# Patient Record
Sex: MALE | Race: WHITE | URBAN - METROPOLITAN AREA
[De-identification: names, ages, dates, MRNs, and addresses within clinical notes are randomized per-mention and may not be internally consistent; named-entity substitution may affect disease eponyms.]

---

## 2017-02-10 ENCOUNTER — EMERGENCY (EMERGENCY)
Facility: HOSPITAL | Age: 82
LOS: 0 days | Discharge: HOME | End: 2017-02-10
Attending: EMERGENCY MEDICINE | Admitting: EMERGENCY MEDICINE
Payer: MEDICARE

## 2017-02-10 VITALS
TEMPERATURE: 98 F | WEIGHT: 177.91 LBS | HEIGHT: 69 IN | OXYGEN SATURATION: 100 % | RESPIRATION RATE: 18 BRPM | SYSTOLIC BLOOD PRESSURE: 170 MMHG | HEART RATE: 81 BPM | DIASTOLIC BLOOD PRESSURE: 91 MMHG

## 2017-02-10 DIAGNOSIS — Z95.1 PRESENCE OF AORTOCORONARY BYPASS GRAFT: ICD-10-CM

## 2017-02-10 DIAGNOSIS — I10 ESSENTIAL (PRIMARY) HYPERTENSION: ICD-10-CM

## 2017-02-10 DIAGNOSIS — Z79.82 LONG TERM (CURRENT) USE OF ASPIRIN: ICD-10-CM

## 2017-02-10 DIAGNOSIS — Z95.1 PRESENCE OF AORTOCORONARY BYPASS GRAFT: Chronic | ICD-10-CM
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
## 2017-02-10 DIAGNOSIS — F41.9 ANXIETY DISORDER, UNSPECIFIED: ICD-10-CM

## 2017-02-10 DIAGNOSIS — S00.212A ABRASION OF LEFT EYELID AND PERIOCULAR AREA, INITIAL ENCOUNTER: ICD-10-CM
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
## 2017-02-10 DIAGNOSIS — I25.10 ATHEROSCLEROTIC HEART DISEASE OF NATIVE CORONARY ARTERY WITHOUT ANGINA PECTORIS: ICD-10-CM

## 2017-02-10 DIAGNOSIS — S09.93XA UNSPECIFIED INJURY OF FACE, INITIAL ENCOUNTER: ICD-10-CM

## 2017-02-10 PROCEDURE — 99285 EMERGENCY DEPT VISIT HI MDM: CPT

## 2017-02-10 PROCEDURE — 72125 CT NECK SPINE W/O DYE: CPT | Mod: 26

## 2017-02-10 PROCEDURE — 76377 3D RENDER W/INTRP POSTPROCES: CPT | Mod: 26

## 2017-02-10 PROCEDURE — 70450 CT HEAD/BRAIN W/O DYE: CPT | Mod: 26

## 2017-02-10 PROCEDURE — 70486 CT MAXILLOFACIAL W/O DYE: CPT | Mod: 26

## 2017-02-10 RX ORDER — TETANUS TOXOID, REDUCED DIPHTHERIA TOXOID AND ACELLULAR PERTUSSIS VACCINE, ADSORBED 5; 2.5; 8; 8; 2.5 [IU]/.5ML; [IU]/.5ML; UG/.5ML; UG/.5ML; UG/.5ML
0.5 SUSPENSION INTRAMUSCULAR ONCE
Qty: 0 | Refills: 0 | Status: DISCONTINUED | OUTPATIENT
Start: 2017-02-10 | End: 2017-02-10

## 2017-02-10 NOTE — ED PROVIDER NOTE - ENMT, MLM
Airway patent, Nasal mucosa clear. Mouth with normal mucosa. Throat has no vesicles, no oropharyngeal exudates and uvula is midline.     Mild ecchymosis inferomedially to the left eye. EOMI intact. No double vision. No evidence of orbital entrapment.

## 2017-02-10 NOTE — ED PROVIDER NOTE - DETAILS:
I, Blanca Woodall, performed the initial face to face bedside interview with this patient regarding history of present illness, review of symptoms and relevant past medical, social and family history.  I completed an independent physical examination.  I was the initial provider who evaluated this patient.  The history, relevant review of systems, past medical and surgical history, medical decision making, and physical examination was documented by the scribe in my presence and I attest to the accuracy of the documentation.    I personally saw the patient with the resident and completed the key components of the history and physical exam. I then discussed the management plan with the resident.

## 2017-02-10 NOTE — ED PROVIDER NOTE - PSH
S/P CABG x 3    S/P colonoscopy    S/P discectomy for herniated nucleus pulposus  2000, 2002 with left dropped foot

## 2017-02-10 NOTE — ED PROVIDER NOTE - NS ED MD SCRIBE ATTENDING SCRIBE SECTIONS
PROGRESS NOTE/RESULTS/PHYSICAL EXAM/REVIEW OF SYSTEMS/PAST MEDICAL/SURGICAL/SOCIAL HISTORY/HISTORY OF PRESENT ILLNESS

## 2017-02-10 NOTE — ED PROVIDER NOTE - MEDICAL DECISION MAKING DETAILS
84yoM pw fall on aspirin - no neurologic deficits/ signs of entrapment/ other injuries. Plan for CT h/ max fac/ ct neck, reassess

## 2017-02-10 NOTE — ED PROVIDER NOTE - PMH
Foot drop, left    GERD (gastroesophageal reflux disease)    H/O hyperlipidemia    HTN - Hypertension

## 2017-02-10 NOTE — ED PROVIDER NOTE - PROGRESS NOTE DETAILS
Mechanical fall with head trauma. No LOC, vomiting or neuro deficits. Plan to get CT head, CT maxillofacial, CT neck, tetanus, and d/c pending results. Pt s/o mechanical fall with head trauma. No LOC, vomiting or neuro deficits. Plan to get CT head, CT maxillofacial, CT neck, tetanus, and d/c pending results. CT imaging significant for Prachi-orbital soft tissue swelling, otherwise no acute pathology.  Patient ambulated without difficulty cleared for discharge home with return precautions and follow-up.

## 2017-02-10 NOTE — ED PROVIDER NOTE - OBJECTIVE STATEMENT
(Trauma alert called by triage) 85 y/o male with a h/o left foot drop, HTN, anxiety, CABGx3 on ASA, RBBB, BIBA s/p slip and fall from standing height in his driveway x1 hour ago, c/o abrasion above left eyebrow. +head injury, No LOC. Pt took ativan 0.5 mg a few minutes prior to fall. No numbness, weakness, blurry vision, HA, nausea, or vomiting. Pt reports chronic back pain, unchanged from baseline. NKDA. (Trauma alert called by triage) 83 y/o male with a h/o left foot drop, HTN, anxiety, CABGx3 on ASA, RBBB, BIBA s/p slip and fall from standing height on ice, in his driveway x1 hour ago, c/o abrasion above left eyebrow. +head injury, No LOC. Pt denies palpitations or dizziness prior to fall. Pt took ativan 0.5 mg a few minutes prior to fall. No numbness, weakness, blurry vision, HA, nausea, or vomiting. Pt reports chronic back pain, unchanged from baseline. NKDA.

## 2017-02-12 LAB — PSA SERPL-MCNC: 2.15

## 2017-02-14 ENCOUNTER — APPOINTMENT (OUTPATIENT)
Dept: UROLOGY | Facility: CLINIC | Age: 82
End: 2017-02-14

## 2017-02-14 VITALS
WEIGHT: 176 LBS | HEIGHT: 70 IN | SYSTOLIC BLOOD PRESSURE: 157 MMHG | HEART RATE: 92 BPM | TEMPERATURE: 97.4 F | BODY MASS INDEX: 25.2 KG/M2 | DIASTOLIC BLOOD PRESSURE: 89 MMHG

## 2017-03-10 ENCOUNTER — APPOINTMENT (OUTPATIENT)
Dept: UROLOGY | Facility: CLINIC | Age: 82
End: 2017-03-10

## 2017-03-10 VITALS — DIASTOLIC BLOOD PRESSURE: 91 MMHG | HEART RATE: 86 BPM | SYSTOLIC BLOOD PRESSURE: 185 MMHG | OXYGEN SATURATION: 99 %

## 2017-03-10 DIAGNOSIS — R35.1 NOCTURIA: ICD-10-CM

## 2017-05-17 ENCOUNTER — APPOINTMENT (OUTPATIENT)
Dept: DERMATOLOGY | Facility: CLINIC | Age: 82
End: 2017-05-17

## 2017-05-17 VITALS — BODY MASS INDEX: 25.77 KG/M2 | WEIGHT: 180 LBS | HEIGHT: 70 IN

## 2017-05-17 DIAGNOSIS — Z85.820 PERSONAL HISTORY OF MALIGNANT MELANOMA OF SKIN: ICD-10-CM

## 2017-05-17 DIAGNOSIS — Z87.19 PERSONAL HISTORY OF OTHER DISEASES OF THE DIGESTIVE SYSTEM: ICD-10-CM

## 2017-05-17 RX ORDER — OMEPRAZOLE 20 MG/1
20 CAPSULE, DELAYED RELEASE ORAL
Qty: 90 | Refills: 0 | Status: ACTIVE | COMMUNITY
Start: 2017-03-22

## 2017-05-17 RX ORDER — TAMSULOSIN HYDROCHLORIDE 0.4 MG/1
0.4 CAPSULE ORAL
Qty: 180 | Refills: 3 | Status: DISCONTINUED | COMMUNITY
Start: 2017-03-10 | End: 2017-05-17

## 2017-05-17 RX ORDER — SILODOSIN 8 MG/1
8 CAPSULE ORAL
Qty: 7 | Refills: 0 | Status: DISCONTINUED | OUTPATIENT
Start: 2017-02-14 | End: 2017-05-17

## 2017-05-17 RX ORDER — TAMSULOSIN HYDROCHLORIDE 0.4 MG/1
0.4 CAPSULE ORAL
Qty: 7 | Refills: 0 | Status: DISCONTINUED | OUTPATIENT
Start: 2017-02-14 | End: 2017-05-17

## 2017-07-05 ENCOUNTER — APPOINTMENT (OUTPATIENT)
Dept: ELECTROPHYSIOLOGY | Facility: CLINIC | Age: 82
End: 2017-07-05

## 2017-07-05 VITALS
WEIGHT: 180 LBS | BODY MASS INDEX: 25.77 KG/M2 | HEIGHT: 70 IN | DIASTOLIC BLOOD PRESSURE: 90 MMHG | SYSTOLIC BLOOD PRESSURE: 160 MMHG | HEART RATE: 66 BPM

## 2017-07-05 VITALS — HEART RATE: 60 BPM | DIASTOLIC BLOOD PRESSURE: 78 MMHG | SYSTOLIC BLOOD PRESSURE: 159 MMHG

## 2017-07-05 RX ORDER — ESOMEPRAZOLE MAGNESIUM 20 MG/1
20 CAPSULE, DELAYED RELEASE ORAL
Qty: 90 | Refills: 0 | Status: COMPLETED | COMMUNITY
Start: 2017-06-12

## 2017-08-01 ENCOUNTER — APPOINTMENT (OUTPATIENT)
Dept: DERMATOLOGY | Facility: CLINIC | Age: 82
End: 2017-08-01
Payer: MEDICARE

## 2017-08-01 DIAGNOSIS — L82.0 INFLAMED SEBORRHEIC KERATOSIS: ICD-10-CM

## 2017-08-01 PROCEDURE — 17110 DESTRUCTION B9 LES UP TO 14: CPT

## 2017-08-14 ENCOUNTER — APPOINTMENT (OUTPATIENT)
Dept: NEUROSURGERY | Facility: CLINIC | Age: 82
End: 2017-08-14
Payer: MEDICARE

## 2017-08-14 VITALS
DIASTOLIC BLOOD PRESSURE: 79 MMHG | RESPIRATION RATE: 16 BRPM | WEIGHT: 186 LBS | TEMPERATURE: 97.5 F | SYSTOLIC BLOOD PRESSURE: 146 MMHG | BODY MASS INDEX: 26.63 KG/M2 | HEIGHT: 70 IN | HEART RATE: 61 BPM

## 2017-08-14 PROCEDURE — 99203 OFFICE O/P NEW LOW 30 MIN: CPT

## 2017-08-21 ENCOUNTER — CLINICAL ADVICE (OUTPATIENT)
Age: 82
End: 2017-08-21

## 2017-11-15 ENCOUNTER — APPOINTMENT (OUTPATIENT)
Dept: DERMATOLOGY | Facility: CLINIC | Age: 82
End: 2017-11-15

## 2017-11-16 ENCOUNTER — TRANSCRIPTION ENCOUNTER (OUTPATIENT)
Age: 82
End: 2017-11-16

## 2017-12-04 ENCOUNTER — APPOINTMENT (OUTPATIENT)
Dept: NEUROSURGERY | Facility: CLINIC | Age: 82
End: 2017-12-04
Payer: MEDICARE

## 2017-12-04 VITALS
DIASTOLIC BLOOD PRESSURE: 85 MMHG | BODY MASS INDEX: 25.77 KG/M2 | WEIGHT: 180 LBS | TEMPERATURE: 97.4 F | HEIGHT: 70 IN | RESPIRATION RATE: 16 BRPM | HEART RATE: 56 BPM | SYSTOLIC BLOOD PRESSURE: 153 MMHG

## 2017-12-04 PROCEDURE — 99214 OFFICE O/P EST MOD 30 MIN: CPT

## 2018-01-02 ENCOUNTER — RX RENEWAL (OUTPATIENT)
Age: 83
End: 2018-01-02

## 2018-01-09 ENCOUNTER — OUTPATIENT (OUTPATIENT)
Dept: OUTPATIENT SERVICES | Facility: HOSPITAL | Age: 83
LOS: 1 days | End: 2018-01-09
Payer: MEDICARE

## 2018-01-09 DIAGNOSIS — Z95.1 PRESENCE OF AORTOCORONARY BYPASS GRAFT: Chronic | ICD-10-CM

## 2018-01-09 DIAGNOSIS — M54.16 RADICULOPATHY, LUMBAR REGION: ICD-10-CM

## 2018-01-09 PROCEDURE — 62323 NJX INTERLAMINAR LMBR/SAC: CPT

## 2018-01-09 PROCEDURE — 52000 CYSTOURETHROSCOPY: CPT

## 2018-01-09 PROCEDURE — 77003 FLUOROGUIDE FOR SPINE INJECT: CPT

## 2018-01-24 ENCOUNTER — APPOINTMENT (OUTPATIENT)
Dept: ELECTROPHYSIOLOGY | Facility: CLINIC | Age: 83
End: 2018-01-24

## 2018-02-05 ENCOUNTER — NON-APPOINTMENT (OUTPATIENT)
Age: 83
End: 2018-02-05

## 2018-02-05 ENCOUNTER — APPOINTMENT (OUTPATIENT)
Dept: ELECTROPHYSIOLOGY | Facility: CLINIC | Age: 83
End: 2018-02-05
Payer: MEDICARE

## 2018-02-05 VITALS
SYSTOLIC BLOOD PRESSURE: 123 MMHG | WEIGHT: 177 LBS | HEART RATE: 68 BPM | DIASTOLIC BLOOD PRESSURE: 71 MMHG | HEIGHT: 70 IN | BODY MASS INDEX: 25.34 KG/M2

## 2018-02-05 PROCEDURE — 99215 OFFICE O/P EST HI 40 MIN: CPT

## 2018-02-05 PROCEDURE — 93000 ELECTROCARDIOGRAM COMPLETE: CPT

## 2018-06-27 ENCOUNTER — EMERGENCY (EMERGENCY)
Facility: HOSPITAL | Age: 83
LOS: 0 days | Discharge: HOME | End: 2018-06-27
Attending: EMERGENCY MEDICINE | Admitting: EMERGENCY MEDICINE
Payer: MEDICARE

## 2018-06-27 VITALS
TEMPERATURE: 98 F | DIASTOLIC BLOOD PRESSURE: 99 MMHG | HEIGHT: 69 IN | OXYGEN SATURATION: 100 % | RESPIRATION RATE: 18 BRPM | WEIGHT: 175.05 LBS | HEART RATE: 94 BPM | SYSTOLIC BLOOD PRESSURE: 183 MMHG

## 2018-06-27 VITALS
OXYGEN SATURATION: 98 % | HEART RATE: 72 BPM | RESPIRATION RATE: 16 BRPM | TEMPERATURE: 98 F | SYSTOLIC BLOOD PRESSURE: 159 MMHG | DIASTOLIC BLOOD PRESSURE: 90 MMHG

## 2018-06-27 DIAGNOSIS — M54.5 LOW BACK PAIN: ICD-10-CM

## 2018-06-27 DIAGNOSIS — K21.9 GASTRO-ESOPHAGEAL REFLUX DISEASE WITHOUT ESOPHAGITIS: ICD-10-CM

## 2018-06-27 DIAGNOSIS — Z79.899 OTHER LONG TERM (CURRENT) DRUG THERAPY: ICD-10-CM

## 2018-06-27 DIAGNOSIS — Z95.1 PRESENCE OF AORTOCORONARY BYPASS GRAFT: Chronic | ICD-10-CM

## 2018-06-27 DIAGNOSIS — I10 ESSENTIAL (PRIMARY) HYPERTENSION: ICD-10-CM

## 2018-06-27 DIAGNOSIS — E78.5 HYPERLIPIDEMIA, UNSPECIFIED: ICD-10-CM

## 2018-06-27 DIAGNOSIS — Z95.1 PRESENCE OF AORTOCORONARY BYPASS GRAFT: ICD-10-CM

## 2018-06-27 PROCEDURE — 73552 X-RAY EXAM OF FEMUR 2/>: CPT | Mod: 26

## 2018-06-27 PROCEDURE — 73502 X-RAY EXAM HIP UNI 2-3 VIEWS: CPT | Mod: 26

## 2018-06-27 PROCEDURE — 99285 EMERGENCY DEPT VISIT HI MDM: CPT | Mod: 25

## 2018-06-27 PROCEDURE — 93971 EXTREMITY STUDY: CPT | Mod: 26,LT

## 2018-06-27 PROCEDURE — 72131 CT LUMBAR SPINE W/O DYE: CPT | Mod: 26

## 2018-06-27 RX ORDER — LIDOCAINE 4 G/100G
1 CREAM TOPICAL ONCE
Qty: 0 | Refills: 0 | Status: COMPLETED | OUTPATIENT
Start: 2018-06-27 | End: 2018-06-27

## 2018-06-27 RX ORDER — MORPHINE SULFATE 50 MG/1
2 CAPSULE, EXTENDED RELEASE ORAL ONCE
Qty: 0 | Refills: 0 | Status: DISCONTINUED | OUTPATIENT
Start: 2018-06-27 | End: 2018-06-27

## 2018-06-27 RX ORDER — LIDOCAINE 4 G/100G
1 CREAM TOPICAL
Qty: 14 | Refills: 0 | OUTPATIENT
Start: 2018-06-27 | End: 2018-07-03

## 2018-06-27 RX ORDER — KETOROLAC TROMETHAMINE 30 MG/ML
15 SYRINGE (ML) INJECTION ONCE
Qty: 0 | Refills: 0 | Status: DISCONTINUED | OUTPATIENT
Start: 2018-06-27 | End: 2018-06-27

## 2018-06-27 RX ORDER — ACETAMINOPHEN 500 MG
975 TABLET ORAL ONCE
Qty: 0 | Refills: 0 | Status: COMPLETED | OUTPATIENT
Start: 2018-06-27 | End: 2018-06-27

## 2018-06-27 RX ORDER — OXYCODONE AND ACETAMINOPHEN 5; 325 MG/1; MG/1
1 TABLET ORAL ONCE
Qty: 0 | Refills: 0 | Status: DISCONTINUED | OUTPATIENT
Start: 2018-06-27 | End: 2018-06-27

## 2018-06-27 RX ADMIN — Medication 15 MILLIGRAM(S): at 00:45

## 2018-06-27 RX ADMIN — OXYCODONE AND ACETAMINOPHEN 1 TABLET(S): 5; 325 TABLET ORAL at 03:00

## 2018-06-27 RX ADMIN — MORPHINE SULFATE 2 MILLIGRAM(S): 50 CAPSULE, EXTENDED RELEASE ORAL at 06:56

## 2018-06-27 RX ADMIN — MORPHINE SULFATE 2 MILLIGRAM(S): 50 CAPSULE, EXTENDED RELEASE ORAL at 06:49

## 2018-06-27 RX ADMIN — Medication 15 MILLIGRAM(S): at 03:00

## 2018-06-27 RX ADMIN — MORPHINE SULFATE 2 MILLIGRAM(S): 50 CAPSULE, EXTENDED RELEASE ORAL at 04:25

## 2018-06-27 RX ADMIN — LIDOCAINE 1 PATCH: 4 CREAM TOPICAL at 08:34

## 2018-06-27 RX ADMIN — Medication 975 MILLIGRAM(S): at 02:08

## 2018-06-27 RX ADMIN — OXYCODONE AND ACETAMINOPHEN 1 TABLET(S): 5; 325 TABLET ORAL at 04:12

## 2018-06-27 NOTE — ED ADULT NURSE NOTE - OBJECTIVE STATEMENT
Pt presents to the ED c/o L leg pain, (L thigh, glute pain) that began today. denies numbness / tingling / sob / chest pain.

## 2018-06-27 NOTE — ED PROVIDER NOTE - PROGRESS NOTE DETAILS
pt told of results.  pt states concerned that he lives alone and concerned about falling.  agree with plan for SW evaluation in the AM Pt states he does not want to wait for SW.  He would like to be discharged home.  He is having someone pick him up from the ED.  We discussed nonnarcotic medication use for pain, and follow up with spine doctor.  Kobe Perez DO.

## 2018-06-27 NOTE — ED PROVIDER NOTE - NEUROLOGICAL LEVEL OF CONSCIOUSNESS
visible left foot drop (chronic).  5/5 strength to all extremities except for left foot drop.  sensations intact

## 2018-06-27 NOTE — ED PROVIDER NOTE - OBJECTIVE STATEMENT
85 y/o male in ED c/o LLE pain x 1 day.  pt states took  motrin with no relief.   states pain severe where he couldn't ambulate.  pt denies any trauma or fall.  no sick contacts or recent travel.   pt denies any fever, HA, cp, sob, n/v/d/abd pain.   states pain starts lower thigh and radiates up through hip to lower back

## 2018-06-27 NOTE — ED ADULT NURSE NOTE - CHPI ED SYMPTOMS NEG
no weakness/no numbness/no chills/no dizziness/no decreased eating/drinking/no tingling/no vomiting/no fever/no nausea

## 2018-06-27 NOTE — ED ADULT TRIAGE NOTE - CHIEF COMPLAINT QUOTE
pt c/o left leg pain from buttocks down to foot starting 8 AM this morning worsening throughout day. denies trauma. denies numbness/tingling however normally doesn't have feeling at baseline. pt states unable to bear weight.

## 2018-06-28 ENCOUNTER — APPOINTMENT (OUTPATIENT)
Dept: NEUROSURGERY | Facility: CLINIC | Age: 83
End: 2018-06-28
Payer: MEDICARE

## 2018-06-28 VITALS
HEART RATE: 72 BPM | WEIGHT: 177 LBS | SYSTOLIC BLOOD PRESSURE: 180 MMHG | RESPIRATION RATE: 17 BRPM | DIASTOLIC BLOOD PRESSURE: 90 MMHG | HEIGHT: 70 IN | BODY MASS INDEX: 25.34 KG/M2 | TEMPERATURE: 98 F

## 2018-06-28 PROCEDURE — 99215 OFFICE O/P EST HI 40 MIN: CPT

## 2018-07-03 ENCOUNTER — APPOINTMENT (OUTPATIENT)
Dept: DERMATOLOGY | Facility: CLINIC | Age: 83
End: 2018-07-03

## 2018-07-03 ENCOUNTER — APPOINTMENT (OUTPATIENT)
Dept: NEUROSURGERY | Facility: CLINIC | Age: 83
End: 2018-07-03
Payer: MEDICARE

## 2018-07-03 VITALS
SYSTOLIC BLOOD PRESSURE: 136 MMHG | HEART RATE: 79 BPM | TEMPERATURE: 97.9 F | WEIGHT: 177 LBS | DIASTOLIC BLOOD PRESSURE: 74 MMHG | BODY MASS INDEX: 25.34 KG/M2 | HEIGHT: 70 IN | RESPIRATION RATE: 16 BRPM

## 2018-07-03 PROCEDURE — 99214 OFFICE O/P EST MOD 30 MIN: CPT

## 2018-07-10 ENCOUNTER — FORM ENCOUNTER (OUTPATIENT)
Age: 83
End: 2018-07-10

## 2018-07-11 ENCOUNTER — OUTPATIENT (OUTPATIENT)
Dept: OUTPATIENT SERVICES | Facility: HOSPITAL | Age: 83
LOS: 1 days | Discharge: HOME | End: 2018-07-11
Payer: MEDICARE

## 2018-07-11 VITALS
SYSTOLIC BLOOD PRESSURE: 156 MMHG | HEART RATE: 71 BPM | OXYGEN SATURATION: 99 % | WEIGHT: 175.05 LBS | RESPIRATION RATE: 16 BRPM | TEMPERATURE: 98 F | HEIGHT: 69.5 IN | DIASTOLIC BLOOD PRESSURE: 79 MMHG

## 2018-07-11 DIAGNOSIS — M54.16 RADICULOPATHY, LUMBAR REGION: ICD-10-CM

## 2018-07-11 DIAGNOSIS — Z41.9 ENCOUNTER FOR PROCEDURE FOR PURPOSES OTHER THAN REMEDYING HEALTH STATE, UNSPECIFIED: Chronic | ICD-10-CM

## 2018-07-11 DIAGNOSIS — M48.062 SPINAL STENOSIS, LUMBAR REGION WITH NEUROGENIC CLAUDICATION: ICD-10-CM

## 2018-07-11 DIAGNOSIS — Z90.49 ACQUIRED ABSENCE OF OTHER SPECIFIED PARTS OF DIGESTIVE TRACT: Chronic | ICD-10-CM

## 2018-07-11 DIAGNOSIS — Z95.1 PRESENCE OF AORTOCORONARY BYPASS GRAFT: Chronic | ICD-10-CM

## 2018-07-11 DIAGNOSIS — Z98.49 CATARACT EXTRACTION STATUS, UNSPECIFIED EYE: Chronic | ICD-10-CM

## 2018-07-11 LAB
ANION GAP SERPL CALC-SCNC: 5 MMOL/L — SIGNIFICANT CHANGE UP (ref 5–17)
APPEARANCE UR: CLEAR — SIGNIFICANT CHANGE UP
APTT BLD: 33.5 SEC — SIGNIFICANT CHANGE UP (ref 27.5–37.4)
BASOPHILS # BLD AUTO: 0.05 K/UL — SIGNIFICANT CHANGE UP (ref 0–0.2)
BASOPHILS NFR BLD AUTO: 0.6 % — SIGNIFICANT CHANGE UP (ref 0–2)
BILIRUB UR-MCNC: NEGATIVE — SIGNIFICANT CHANGE UP
BLD GP AB SCN SERPL QL: SIGNIFICANT CHANGE UP
BUN SERPL-MCNC: 20 MG/DL — SIGNIFICANT CHANGE UP (ref 7–23)
CALCIUM SERPL-MCNC: 9.2 MG/DL — SIGNIFICANT CHANGE UP (ref 8.5–10.1)
CHLORIDE SERPL-SCNC: 104 MMOL/L — SIGNIFICANT CHANGE UP (ref 96–108)
CO2 SERPL-SCNC: 29 MMOL/L — SIGNIFICANT CHANGE UP (ref 22–31)
COLOR SPEC: YELLOW — SIGNIFICANT CHANGE UP
CREAT SERPL-MCNC: 1.02 MG/DL — SIGNIFICANT CHANGE UP (ref 0.5–1.3)
DIFF PNL FLD: NEGATIVE — SIGNIFICANT CHANGE UP
EOSINOPHIL # BLD AUTO: 0.13 K/UL — SIGNIFICANT CHANGE UP (ref 0–0.5)
EOSINOPHIL NFR BLD AUTO: 1.6 % — SIGNIFICANT CHANGE UP (ref 0–6)
GLUCOSE SERPL-MCNC: 138 MG/DL — HIGH (ref 70–99)
GLUCOSE UR QL: NEGATIVE MG/DL — SIGNIFICANT CHANGE UP
HCT VFR BLD CALC: 41.9 % — SIGNIFICANT CHANGE UP (ref 39–50)
HGB BLD-MCNC: 14.2 G/DL — SIGNIFICANT CHANGE UP (ref 13–17)
IMM GRANULOCYTES NFR BLD AUTO: 0.2 % — SIGNIFICANT CHANGE UP (ref 0–1.5)
INR BLD: 1.07 RATIO — SIGNIFICANT CHANGE UP (ref 0.88–1.16)
KETONES UR-MCNC: ABNORMAL
LEUKOCYTE ESTERASE UR-ACNC: NEGATIVE — SIGNIFICANT CHANGE UP
LYMPHOCYTES # BLD AUTO: 1.82 K/UL — SIGNIFICANT CHANGE UP (ref 1–3.3)
LYMPHOCYTES # BLD AUTO: 22 % — SIGNIFICANT CHANGE UP (ref 13–44)
MCHC RBC-ENTMCNC: 30 PG — SIGNIFICANT CHANGE UP (ref 27–34)
MCHC RBC-ENTMCNC: 33.9 GM/DL — SIGNIFICANT CHANGE UP (ref 32–36)
MCV RBC AUTO: 88.4 FL — SIGNIFICANT CHANGE UP (ref 80–100)
MONOCYTES # BLD AUTO: 0.56 K/UL — SIGNIFICANT CHANGE UP (ref 0–0.9)
MONOCYTES NFR BLD AUTO: 6.8 % — SIGNIFICANT CHANGE UP (ref 2–14)
MRSA PCR RESULT.: SIGNIFICANT CHANGE UP
NEUTROPHILS # BLD AUTO: 5.71 K/UL — SIGNIFICANT CHANGE UP (ref 1.8–7.4)
NEUTROPHILS NFR BLD AUTO: 68.8 % — SIGNIFICANT CHANGE UP (ref 43–77)
NITRITE UR-MCNC: NEGATIVE — SIGNIFICANT CHANGE UP
NRBC # BLD: 0 /100 WBCS — SIGNIFICANT CHANGE UP (ref 0–0)
PH UR: 7 — SIGNIFICANT CHANGE UP (ref 5–8)
PLATELET # BLD AUTO: 225 K/UL — SIGNIFICANT CHANGE UP (ref 150–400)
POTASSIUM SERPL-MCNC: 4.6 MMOL/L — SIGNIFICANT CHANGE UP (ref 3.5–5.3)
POTASSIUM SERPL-SCNC: 4.6 MMOL/L — SIGNIFICANT CHANGE UP (ref 3.5–5.3)
PROT UR-MCNC: NEGATIVE MG/DL — SIGNIFICANT CHANGE UP
PROTHROM AB SERPL-ACNC: 11.6 SEC — SIGNIFICANT CHANGE UP (ref 9.8–12.7)
RBC # BLD: 4.74 M/UL — SIGNIFICANT CHANGE UP (ref 4.2–5.8)
RBC # FLD: 12.4 % — SIGNIFICANT CHANGE UP (ref 10.3–14.5)
S AUREUS DNA NOSE QL NAA+PROBE: SIGNIFICANT CHANGE UP
SODIUM SERPL-SCNC: 138 MMOL/L — SIGNIFICANT CHANGE UP (ref 135–145)
SP GR SPEC: 1.01 — SIGNIFICANT CHANGE UP (ref 1.01–1.02)
TYPE + AB SCN PNL BLD: SIGNIFICANT CHANGE UP
UROBILINOGEN FLD QL: 1 MG/DL
WBC # BLD: 8.29 K/UL — SIGNIFICANT CHANGE UP (ref 3.8–10.5)
WBC # FLD AUTO: 8.29 K/UL — SIGNIFICANT CHANGE UP (ref 3.8–10.5)

## 2018-07-11 PROCEDURE — 71046 X-RAY EXAM CHEST 2 VIEWS: CPT | Mod: 26

## 2018-07-11 PROCEDURE — 93010 ELECTROCARDIOGRAM REPORT: CPT

## 2018-07-11 NOTE — H&P PST ADULT - ASSESSMENT
86 year old male  presents to PST for to PST for L2-L3 L3-L4 bilateral decompression via unilateral laminotomy   1. PST instructions given ; NPO post midnight   2. Pt instructed to take following meds with sip of water : omeprazole avapro crestor   3. EZ wash instructions given & mupirocin instructions given  4. Medical Evaluation with Dr Vallecillo and cardio Dr Royal Carr 86 year old male  presents to PST for to PST for L2-L3 L3-L4 bilateral decompression via unilateral laminotomy   1. PST instructions given ; NPO post midnight   2. Pt instructed to take following meds with sip of water : omeprazole avapro crestor   3. EZ wash instructions given & mupirocin instructions given  4. Medical Evaluation with Dr Vallecillo and cardio Dr Marc Weinberg CAPRINI SCORE [CLOT]    AGE RELATED RISK FACTORS                                                       MOBILITY RELATED FACTORS  [ ] Age 41-60 years                                            (1 Point)                  [ ] Bed rest                                                        (1 Point)  [ ] Age: 61-74 years                                           (2 Points)                 [ ] Plaster cast                                                   (2 Points)  [x ] Age= 75 years                                              (3 Points)                 [ ] Bed bound for more than 72 hours                 (2 Points)    DISEASE RELATED RISK FACTORS                                               GENDER SPECIFIC FACTORS  [ x] Edema in the lower extremities                       (1 Point)                  [ ] Pregnancy                                                     (1 Point)  [ ] Varicose veins                                               (1 Point)                  [ ] Post-partum < 6 weeks                                   (1 Point)             [ ] BMI > 25 Kg/m2                                            (1 Point)                  [ ] Hormonal therapy  or oral contraception          (1 Point)                 [ ] Sepsis (in the previous month)                        (1 Point)                  [ ] History of pregnancy complications                 (1 point)  [ ] Pneumonia or serious lung disease                                               [ ] Unexplained or recurrent                     (1 Point)           (in the previous month)                               (1 Point)  [ ] Abnormal pulmonary function test                     (1 Point)                 SURGERY RELATED RISK FACTORS  [ ] Acute myocardial infarction                              (1 Point)                 [ ]  Section                                             (1 Point)  [ ] Congestive heart failure (in the previous month)  (1 Point)               [ ] Minor surgery                                                  (1 Point)   [ ] Inflammatory bowel disease                             (1 Point)                 [ ] Arthroscopic surgery                                        (2 Points)  [ ] Central venous access                                      (2 Points)                [x ] General surgery lasting more than 45 minutes   (2 Points)       [ ] Stroke (in the previous month)                          (5 Points)               [ ] Elective arthroplasty                                         (5 Points)                                                                                                                                               HEMATOLOGY RELATED FACTORS                                                 TRAUMA RELATED RISK FACTORS  [ ] Prior episodes of VTE                                     (3 Points)                 [ ] Fracture of the hip, pelvis, or leg                       (5 Points)  [ ] Positive family history for VTE                         (3 Points)                 [ ] Acute spinal cord injury (in the previous month)  (5 Points)  [ ] Prothrombin 61404 A                                     (3 Points)                 [ ] Paralysis  (less than 1 month)                             (5 Points)  [ ] Factor V Leiden                                             (3 Points)                  [ ] Multiple Trauma within 1 month                        (5 Points)  [ ] Lupus anticoagulants                                     (3 Points)                                                           [ ] Anticardiolipin antibodies                               (3 Points)                                                       [ ] High homocysteine in the blood                      (3 Points)                                             [ ] Other congenital or acquired thrombophilia      (3 Points)                                                [ ] Heparin induced thrombocytopenia                  (3 Points)                                          Total Score [     6     ]

## 2018-07-11 NOTE — H&P PST ADULT - PMH
BPH (benign prostatic hyperplasia)    Foot drop, left    GERD (gastroesophageal reflux disease)    H/O hyperlipidemia    HTN - Hypertension    Tremors of nervous system BPH (benign prostatic hyperplasia)    Foot drop, left    GERD (gastroesophageal reflux disease)    H/O hyperlipidemia    HTN - Hypertension    Lumbar radiculopathy    Lumbar stenosis    Tremors of nervous system Anxiety    Back pain    BPH (benign prostatic hyperplasia)    CAD (coronary artery disease) of artery bypass graft    Foot drop, left    GERD (gastroesophageal reflux disease)    H/O hyperlipidemia    HTN - Hypertension    Lumbar radiculopathy    Lumbar stenosis    Melanoma    Stented coronary artery  2004  Tremors of nervous system

## 2018-07-11 NOTE — H&P PST ADULT - NSANTHOSAYNRD_GEN_A_CORE
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 Ambulating with cane. Denies fever, chills, no acute complaints.
No. LUISANA screening performed.  STOP BANG Legend: 0-2 = LOW Risk; 3-4 = INTERMEDIATE Risk; 5-8 = HIGH Risk

## 2018-07-11 NOTE — H&P PST ADULT - HISTORY OF PRESENT ILLNESS
86 year old male 86 year old male PMH of  CAD s/p CABG x3 , HTN, HLD, tremors, GERD, BPH c/o chronic  back pain; Pt had back surgery in the past; With difficulty ambulation the past 4-5 weeks ; He has  foot drop left foot; Imaging done   Pt diagnosed with lumbar radiculopathy and lumbar stenosis ; He presents to PST for to PST for L2-L3 L3-L4 bilateral decompression via unilateral laminotomy

## 2018-07-11 NOTE — H&P PST ADULT - PSH
History of cataract surgery  IOL  History of cholecystectomy  2015  S/P CABG x 3  2013  S/P colonoscopy    S/P discectomy for herniated nucleus pulposus  2000, 2002 with left dropped foot Elective surgery  excision of melanoma  History of cataract surgery  IOL  History of cholecystectomy  2015  S/P CABG x 3  2013  S/P colonoscopy    S/P discectomy for herniated nucleus pulposus  2000, 2002 with left dropped foot

## 2018-07-16 ENCOUNTER — APPOINTMENT (OUTPATIENT)
Dept: NEUROSURGERY | Facility: HOSPITAL | Age: 83
End: 2018-07-16

## 2018-07-16 ENCOUNTER — OUTPATIENT (OUTPATIENT)
Dept: INPATIENT UNIT | Facility: HOSPITAL | Age: 83
LOS: 1 days | Discharge: HOME | End: 2018-07-16
Payer: MEDICARE

## 2018-07-16 VITALS
RESPIRATION RATE: 16 BRPM | WEIGHT: 175.05 LBS | DIASTOLIC BLOOD PRESSURE: 69 MMHG | SYSTOLIC BLOOD PRESSURE: 137 MMHG | OXYGEN SATURATION: 98 % | HEART RATE: 72 BPM | TEMPERATURE: 98 F | HEIGHT: 69 IN

## 2018-07-16 DIAGNOSIS — Z98.49 CATARACT EXTRACTION STATUS, UNSPECIFIED EYE: Chronic | ICD-10-CM

## 2018-07-16 DIAGNOSIS — Z95.1 PRESENCE OF AORTOCORONARY BYPASS GRAFT: Chronic | ICD-10-CM

## 2018-07-16 DIAGNOSIS — Z41.9 ENCOUNTER FOR PROCEDURE FOR PURPOSES OTHER THAN REMEDYING HEALTH STATE, UNSPECIFIED: Chronic | ICD-10-CM

## 2018-07-16 DIAGNOSIS — Z90.49 ACQUIRED ABSENCE OF OTHER SPECIFIED PARTS OF DIGESTIVE TRACT: Chronic | ICD-10-CM

## 2018-07-16 LAB
ANION GAP SERPL CALC-SCNC: 8 MMOL/L — SIGNIFICANT CHANGE UP (ref 5–17)
BASOPHILS # BLD AUTO: 0.03 K/UL — SIGNIFICANT CHANGE UP (ref 0–0.2)
BASOPHILS NFR BLD AUTO: 0.3 % — SIGNIFICANT CHANGE UP (ref 0–2)
BUN SERPL-MCNC: 20 MG/DL — SIGNIFICANT CHANGE UP (ref 7–23)
CALCIUM SERPL-MCNC: 9.2 MG/DL — SIGNIFICANT CHANGE UP (ref 8.5–10.1)
CHLORIDE SERPL-SCNC: 105 MMOL/L — SIGNIFICANT CHANGE UP (ref 96–108)
CO2 SERPL-SCNC: 27 MMOL/L — SIGNIFICANT CHANGE UP (ref 22–31)
CREAT SERPL-MCNC: 1.06 MG/DL — SIGNIFICANT CHANGE UP (ref 0.5–1.3)
EOSINOPHIL # BLD AUTO: 0.02 K/UL — SIGNIFICANT CHANGE UP (ref 0–0.5)
EOSINOPHIL NFR BLD AUTO: 0.2 % — SIGNIFICANT CHANGE UP (ref 0–6)
GLUCOSE SERPL-MCNC: 137 MG/DL — HIGH (ref 70–99)
HCT VFR BLD CALC: 41.8 % — SIGNIFICANT CHANGE UP (ref 39–50)
HGB BLD-MCNC: 14.5 G/DL — SIGNIFICANT CHANGE UP (ref 13–17)
IMM GRANULOCYTES NFR BLD AUTO: 0.5 % — SIGNIFICANT CHANGE UP (ref 0–1.5)
LYMPHOCYTES # BLD AUTO: 0.8 K/UL — LOW (ref 1–3.3)
LYMPHOCYTES # BLD AUTO: 8.2 % — LOW (ref 13–44)
MCHC RBC-ENTMCNC: 30.8 PG — SIGNIFICANT CHANGE UP (ref 27–34)
MCHC RBC-ENTMCNC: 34.7 GM/DL — SIGNIFICANT CHANGE UP (ref 32–36)
MCV RBC AUTO: 88.7 FL — SIGNIFICANT CHANGE UP (ref 80–100)
MONOCYTES # BLD AUTO: 0.14 K/UL — SIGNIFICANT CHANGE UP (ref 0–0.9)
MONOCYTES NFR BLD AUTO: 1.4 % — LOW (ref 2–14)
NEUTROPHILS # BLD AUTO: 8.73 K/UL — HIGH (ref 1.8–7.4)
NEUTROPHILS NFR BLD AUTO: 89.4 % — HIGH (ref 43–77)
NRBC # BLD: 0 /100 WBCS — SIGNIFICANT CHANGE UP (ref 0–0)
PLATELET # BLD AUTO: 215 K/UL — SIGNIFICANT CHANGE UP (ref 150–400)
POTASSIUM SERPL-MCNC: 4.4 MMOL/L — SIGNIFICANT CHANGE UP (ref 3.5–5.3)
POTASSIUM SERPL-SCNC: 4.4 MMOL/L — SIGNIFICANT CHANGE UP (ref 3.5–5.3)
RBC # BLD: 4.71 M/UL — SIGNIFICANT CHANGE UP (ref 4.2–5.8)
RBC # FLD: 12 % — SIGNIFICANT CHANGE UP (ref 10.3–14.5)
SODIUM SERPL-SCNC: 140 MMOL/L — SIGNIFICANT CHANGE UP (ref 135–145)
WBC # BLD: 9.77 K/UL — SIGNIFICANT CHANGE UP (ref 3.8–10.5)
WBC # FLD AUTO: 9.77 K/UL — SIGNIFICANT CHANGE UP (ref 3.8–10.5)

## 2018-07-16 PROCEDURE — 63048 LAM FACETEC &FORAMOT EA ADDL: CPT

## 2018-07-16 PROCEDURE — 99024 POSTOP FOLLOW-UP VISIT: CPT

## 2018-07-16 PROCEDURE — 63047 LAM FACETEC & FORAMOT LUMBAR: CPT

## 2018-07-16 RX ORDER — PANTOPRAZOLE SODIUM 20 MG/1
40 TABLET, DELAYED RELEASE ORAL
Qty: 0 | Refills: 0 | Status: DISCONTINUED | OUTPATIENT
Start: 2018-07-16 | End: 2018-07-17

## 2018-07-16 RX ORDER — SODIUM CHLORIDE 9 MG/ML
1000 INJECTION INTRAMUSCULAR; INTRAVENOUS; SUBCUTANEOUS
Qty: 0 | Refills: 0 | Status: DISCONTINUED | OUTPATIENT
Start: 2018-07-16 | End: 2018-07-16

## 2018-07-16 RX ORDER — ONDANSETRON 8 MG/1
4 TABLET, FILM COATED ORAL EVERY 6 HOURS
Qty: 0 | Refills: 0 | Status: DISCONTINUED | OUTPATIENT
Start: 2018-07-16 | End: 2018-07-17

## 2018-07-16 RX ORDER — FENTANYL CITRATE 50 UG/ML
50 INJECTION INTRAVENOUS
Qty: 0 | Refills: 0 | Status: DISCONTINUED | OUTPATIENT
Start: 2018-07-16 | End: 2018-07-16

## 2018-07-16 RX ORDER — METHOCARBAMOL 500 MG/1
500 TABLET, FILM COATED ORAL EVERY 6 HOURS
Qty: 0 | Refills: 0 | Status: DISCONTINUED | OUTPATIENT
Start: 2018-07-16 | End: 2018-07-17

## 2018-07-16 RX ORDER — LOSARTAN POTASSIUM 100 MG/1
100 TABLET, FILM COATED ORAL DAILY
Qty: 0 | Refills: 0 | Status: DISCONTINUED | OUTPATIENT
Start: 2018-07-16 | End: 2018-07-17

## 2018-07-16 RX ORDER — ONDANSETRON 8 MG/1
4 TABLET, FILM COATED ORAL ONCE
Qty: 0 | Refills: 0 | Status: DISCONTINUED | OUTPATIENT
Start: 2018-07-16 | End: 2018-07-16

## 2018-07-16 RX ORDER — DIPHENHYDRAMINE HCL 50 MG
12.5 CAPSULE ORAL EVERY 4 HOURS
Qty: 0 | Refills: 0 | Status: DISCONTINUED | OUTPATIENT
Start: 2018-07-16 | End: 2018-07-17

## 2018-07-16 RX ORDER — ATORVASTATIN CALCIUM 80 MG/1
20 TABLET, FILM COATED ORAL AT BEDTIME
Qty: 0 | Refills: 0 | Status: DISCONTINUED | OUTPATIENT
Start: 2018-07-16 | End: 2018-07-17

## 2018-07-16 RX ORDER — HYDROMORPHONE HYDROCHLORIDE 2 MG/ML
0.5 INJECTION INTRAMUSCULAR; INTRAVENOUS; SUBCUTANEOUS
Qty: 0 | Refills: 0 | Status: DISCONTINUED | OUTPATIENT
Start: 2018-07-16 | End: 2018-07-16

## 2018-07-16 RX ORDER — OXYCODONE HYDROCHLORIDE 5 MG/1
5 TABLET ORAL EVERY 4 HOURS
Qty: 0 | Refills: 0 | Status: DISCONTINUED | OUTPATIENT
Start: 2018-07-16 | End: 2018-07-17

## 2018-07-16 RX ORDER — SENNA PLUS 8.6 MG/1
2 TABLET ORAL AT BEDTIME
Qty: 0 | Refills: 0 | Status: DISCONTINUED | OUTPATIENT
Start: 2018-07-16 | End: 2018-07-17

## 2018-07-16 RX ORDER — HYDROMORPHONE HYDROCHLORIDE 2 MG/ML
0.5 INJECTION INTRAMUSCULAR; INTRAVENOUS; SUBCUTANEOUS
Qty: 0 | Refills: 0 | Status: DISCONTINUED | OUTPATIENT
Start: 2018-07-16 | End: 2018-07-17

## 2018-07-16 RX ORDER — HYDROMORPHONE HYDROCHLORIDE 2 MG/ML
1 INJECTION INTRAMUSCULAR; INTRAVENOUS; SUBCUTANEOUS EVERY 4 HOURS
Qty: 0 | Refills: 0 | Status: DISCONTINUED | OUTPATIENT
Start: 2018-07-16 | End: 2018-07-17

## 2018-07-16 RX ORDER — ACETAMINOPHEN 500 MG
1000 TABLET ORAL ONCE
Qty: 0 | Refills: 0 | Status: COMPLETED | OUTPATIENT
Start: 2018-07-16 | End: 2018-07-16

## 2018-07-16 RX ORDER — CEFAZOLIN SODIUM 1 G
2000 VIAL (EA) INJECTION EVERY 8 HOURS
Qty: 0 | Refills: 0 | Status: COMPLETED | OUTPATIENT
Start: 2018-07-16 | End: 2018-07-17

## 2018-07-16 RX ORDER — METOCLOPRAMIDE HCL 10 MG
10 TABLET ORAL ONCE
Qty: 0 | Refills: 0 | Status: DISCONTINUED | OUTPATIENT
Start: 2018-07-16 | End: 2018-07-17

## 2018-07-16 RX ORDER — GABAPENTIN 400 MG/1
300 CAPSULE ORAL AT BEDTIME
Qty: 0 | Refills: 0 | Status: DISCONTINUED | OUTPATIENT
Start: 2018-07-16 | End: 2018-07-17

## 2018-07-16 RX ORDER — OXYCODONE HYDROCHLORIDE 5 MG/1
10 TABLET ORAL EVERY 4 HOURS
Qty: 0 | Refills: 0 | Status: DISCONTINUED | OUTPATIENT
Start: 2018-07-16 | End: 2018-07-17

## 2018-07-16 RX ORDER — DOCUSATE SODIUM 100 MG
100 CAPSULE ORAL THREE TIMES A DAY
Qty: 0 | Refills: 0 | Status: DISCONTINUED | OUTPATIENT
Start: 2018-07-16 | End: 2018-07-17

## 2018-07-16 RX ORDER — ACETAMINOPHEN 500 MG
1000 TABLET ORAL ONCE
Qty: 0 | Refills: 0 | Status: DISCONTINUED | OUTPATIENT
Start: 2018-07-16 | End: 2018-07-17

## 2018-07-16 RX ADMIN — GABAPENTIN 300 MILLIGRAM(S): 400 CAPSULE ORAL at 21:05

## 2018-07-16 RX ADMIN — Medication 100 MILLIGRAM(S): at 21:05

## 2018-07-16 RX ADMIN — HYDROMORPHONE HYDROCHLORIDE 0.5 MILLIGRAM(S): 2 INJECTION INTRAMUSCULAR; INTRAVENOUS; SUBCUTANEOUS at 17:24

## 2018-07-16 RX ADMIN — Medication 400 MILLIGRAM(S): at 17:07

## 2018-07-16 RX ADMIN — FENTANYL CITRATE 50 MICROGRAM(S): 50 INJECTION INTRAVENOUS at 17:24

## 2018-07-16 RX ADMIN — Medication 100 MILLIGRAM(S): at 20:18

## 2018-07-16 RX ADMIN — OXYCODONE HYDROCHLORIDE 5 MILLIGRAM(S): 5 TABLET ORAL at 23:29

## 2018-07-16 RX ADMIN — FENTANYL CITRATE 50 MICROGRAM(S): 50 INJECTION INTRAVENOUS at 17:23

## 2018-07-16 RX ADMIN — SENNA PLUS 2 TABLET(S): 8.6 TABLET ORAL at 21:05

## 2018-07-16 RX ADMIN — HYDROMORPHONE HYDROCHLORIDE 0.5 MILLIGRAM(S): 2 INJECTION INTRAMUSCULAR; INTRAVENOUS; SUBCUTANEOUS at 17:23

## 2018-07-16 RX ADMIN — HYDROMORPHONE HYDROCHLORIDE 0.5 MILLIGRAM(S): 2 INJECTION INTRAMUSCULAR; INTRAVENOUS; SUBCUTANEOUS at 17:02

## 2018-07-16 RX ADMIN — FENTANYL CITRATE 50 MICROGRAM(S): 50 INJECTION INTRAVENOUS at 16:51

## 2018-07-16 RX ADMIN — ATORVASTATIN CALCIUM 20 MILLIGRAM(S): 80 TABLET, FILM COATED ORAL at 21:05

## 2018-07-16 RX ADMIN — FENTANYL CITRATE 50 MICROGRAM(S): 50 INJECTION INTRAVENOUS at 16:44

## 2018-07-16 NOTE — PROGRESS NOTE ADULT - ASSESSMENT
85 yo male Left L2/3 and L3/4 laminotomy with bilateral decompression.      - advance diet as tolerated   - oob w/ pT   - awaiting void trial/ bladder scan   - cont current pain regimen   - encourage incentive shaina   - seq teds on for dvt pph   - cont bowel regimen   - all above d/w Dr Rowell

## 2018-07-16 NOTE — PROGRESS NOTE ADULT - SUBJECTIVE AND OBJECTIVE BOX
Patient is a 86y old  Male who presents with a chief complaint of L 2/3 L3/4 Bilateral decompression via unilateral laminotomy (16 Jul 2018 10:12)      HPI:    Pt seen and examined post-operatively, pt doing well. Pt is s/p Left L2/3 and L3/4 laminotomy with bilateral decompression.  Pt states he has some back pain, denies any radicular pain. Pt has not voided yet. Pt denies any numbness, tingling, or weakness. Pt states pre-op leg pain is improved.         PAST MEDICAL & SURGICAL HISTORY:  Back pain  CAD (coronary artery disease) of artery bypass graft  Anxiety  Melanoma  Stented coronary artery: 2004  Lumbar stenosis  Lumbar radiculopathy  BPH (benign prostatic hyperplasia)  Tremors of nervous system  Foot drop, left  GERD (gastroesophageal reflux disease)  HTN - Hypertension  H/O hyperlipidemia  Elective surgery: excision of melanoma  History of cataract surgery: IOL  History of cholecystectomy: 2015  S/P CABG x 3: 2013  S/P colonoscopy  S/P discectomy for herniated nucleus pulposus: 2000, 2002 with left dropped foot      FAMILY HISTORY:  No pertinent family history in first degree relatives      Social Hx:  Nonsmoker, no drug or alcohol use    MEDICATIONS  (STANDING):  acetaminophen  IVPB. 1000 milliGRAM(s) IV Intermittent once  atorvastatin 20 milliGRAM(s) Oral at bedtime  ceFAZolin   IVPB 2000 milliGRAM(s) IV Intermittent every 8 hours  docusate sodium 100 milliGRAM(s) Oral three times a day  gabapentin 300 milliGRAM(s) Oral at bedtime  losartan 100 milliGRAM(s) Oral daily  multivitamin 1 Tablet(s) Oral daily  pantoprazole    Tablet 40 milliGRAM(s) Oral before breakfast  senna 2 Tablet(s) Oral at bedtime       Allergies    No Known Allergies    Intolerances        ROS: Pertinent positives in HPI, all other ROS were reviewed and are negative.      Vital Signs Last 24 Hrs  T(C): 36.3 (16 Jul 2018 18:54), Max: 36.6 (16 Jul 2018 10:07)  T(F): 97.4 (16 Jul 2018 18:54), Max: 97.8 (16 Jul 2018 10:07)  HR: 87 (16 Jul 2018 18:54) (72 - 90)  BP: 157/75 (16 Jul 2018 18:54) (137/69 - 170/78)  BP(mean): --  RR: 18 (16 Jul 2018 18:54) (13 - 18)  SpO2: 100% (16 Jul 2018 18:54) (98% - 100%)    Constitutional: awake and alert.  HEENT: PERRLA, EOMI,   Neck: Supple.  Respiratory: Breath sounds are clear bilaterally  Cardiovascular: S1 and S2, regular rhythm  Gastrointestinal: soft, nontender  Extremities:  no edema  Vascular: Caritid Bruit - no  Musculoskeletal: no joint swelling/tenderness, no abnormal movements  Skin: No rashes    Neurological exam:  HF: A x O x 3. Appropriately interactive, normal affect. Speech fluent, No Aphasia or paraphasic errors. Naming /repetition intact   CN: JONATHAN, EOMI, VFF,   Motor: No pronator drift, Strength 5/5 in all 4 ext, except left old foot drop DF 0/5  Sens: Intact to light touch  Gait/Balance: /Cannot test    Labs:   07-16    140  |  105  |  20  ----------------------------<  137<H>  4.4   |  27  |  1.06    Ca    9.2      16 Jul 2018 17:09                       14.5   9.77  )-----------( 215      ( 16 Jul 2018 17:09 )             41.8

## 2018-07-16 NOTE — BRIEF OPERATIVE NOTE - PROCEDURE
<<-----Click on this checkbox to enter Procedure Laminectomy for decompression of spine  07/16/2018  Left L2/3 and L3/4 laminotomy with bilateral decompression.  Active  [id number]

## 2018-07-16 NOTE — PATIENT PROFILE ADULT. - PMH
Anxiety    Back pain    BPH (benign prostatic hyperplasia)    CAD (coronary artery disease) of artery bypass graft    Foot drop, left    GERD (gastroesophageal reflux disease)    H/O hyperlipidemia    HTN - Hypertension    Lumbar radiculopathy    Lumbar stenosis    Melanoma    Stented coronary artery  2004  Tremors of nervous system

## 2018-07-16 NOTE — BRIEF OPERATIVE NOTE - POST-OP DX
Lumbar radiculopathy  07/16/2018    Active  Carter Rowell  Lumbar stenosis with neurogenic claudication  07/16/2018    Active  Carter Rowell

## 2018-07-16 NOTE — PATIENT PROFILE ADULT. - PSH
Elective surgery  excision of melanoma  History of cataract surgery  IOL  History of cholecystectomy  2015  S/P CABG x 3  2013  S/P colonoscopy    S/P discectomy for herniated nucleus pulposus  2000, 2002 with left dropped foot

## 2018-07-17 ENCOUNTER — TRANSCRIPTION ENCOUNTER (OUTPATIENT)
Age: 83
End: 2018-07-17

## 2018-07-17 VITALS
RESPIRATION RATE: 16 BRPM | OXYGEN SATURATION: 99 % | DIASTOLIC BLOOD PRESSURE: 66 MMHG | SYSTOLIC BLOOD PRESSURE: 107 MMHG | TEMPERATURE: 98 F | HEART RATE: 81 BPM

## 2018-07-17 LAB
ANION GAP SERPL CALC-SCNC: 9 MMOL/L — SIGNIFICANT CHANGE UP (ref 5–17)
BASOPHILS # BLD AUTO: 0.01 K/UL — SIGNIFICANT CHANGE UP (ref 0–0.2)
BASOPHILS NFR BLD AUTO: 0.1 % — SIGNIFICANT CHANGE UP (ref 0–2)
BUN SERPL-MCNC: 19 MG/DL — SIGNIFICANT CHANGE UP (ref 7–23)
CALCIUM SERPL-MCNC: 9.3 MG/DL — SIGNIFICANT CHANGE UP (ref 8.5–10.1)
CHLORIDE SERPL-SCNC: 102 MMOL/L — SIGNIFICANT CHANGE UP (ref 96–108)
CO2 SERPL-SCNC: 27 MMOL/L — SIGNIFICANT CHANGE UP (ref 22–31)
CREAT SERPL-MCNC: 1.24 MG/DL — SIGNIFICANT CHANGE UP (ref 0.5–1.3)
EOSINOPHIL # BLD AUTO: 0 K/UL — SIGNIFICANT CHANGE UP (ref 0–0.5)
EOSINOPHIL NFR BLD AUTO: 0 % — SIGNIFICANT CHANGE UP (ref 0–6)
GLUCOSE SERPL-MCNC: 132 MG/DL — HIGH (ref 70–99)
HCT VFR BLD CALC: 43.1 % — SIGNIFICANT CHANGE UP (ref 39–50)
HGB BLD-MCNC: 14.7 G/DL — SIGNIFICANT CHANGE UP (ref 13–17)
IMM GRANULOCYTES NFR BLD AUTO: 0.5 % — SIGNIFICANT CHANGE UP (ref 0–1.5)
LYMPHOCYTES # BLD AUTO: 0.91 K/UL — LOW (ref 1–3.3)
LYMPHOCYTES # BLD AUTO: 7.1 % — LOW (ref 13–44)
MCHC RBC-ENTMCNC: 29.8 PG — SIGNIFICANT CHANGE UP (ref 27–34)
MCHC RBC-ENTMCNC: 34.1 GM/DL — SIGNIFICANT CHANGE UP (ref 32–36)
MCV RBC AUTO: 87.2 FL — SIGNIFICANT CHANGE UP (ref 80–100)
MONOCYTES # BLD AUTO: 0.47 K/UL — SIGNIFICANT CHANGE UP (ref 0–0.9)
MONOCYTES NFR BLD AUTO: 3.6 % — SIGNIFICANT CHANGE UP (ref 2–14)
NEUTROPHILS # BLD AUTO: 11.45 K/UL — HIGH (ref 1.8–7.4)
NEUTROPHILS NFR BLD AUTO: 88.7 % — HIGH (ref 43–77)
NRBC # BLD: 0 /100 WBCS — SIGNIFICANT CHANGE UP (ref 0–0)
PLATELET # BLD AUTO: 255 K/UL — SIGNIFICANT CHANGE UP (ref 150–400)
POTASSIUM SERPL-MCNC: 4 MMOL/L — SIGNIFICANT CHANGE UP (ref 3.5–5.3)
POTASSIUM SERPL-SCNC: 4 MMOL/L — SIGNIFICANT CHANGE UP (ref 3.5–5.3)
RBC # BLD: 4.94 M/UL — SIGNIFICANT CHANGE UP (ref 4.2–5.8)
RBC # FLD: 12.3 % — SIGNIFICANT CHANGE UP (ref 10.3–14.5)
SODIUM SERPL-SCNC: 138 MMOL/L — SIGNIFICANT CHANGE UP (ref 135–145)
WBC # BLD: 12.9 K/UL — HIGH (ref 3.8–10.5)
WBC # FLD AUTO: 12.9 K/UL — HIGH (ref 3.8–10.5)

## 2018-07-17 PROCEDURE — 99024 POSTOP FOLLOW-UP VISIT: CPT

## 2018-07-17 PROCEDURE — 99238 HOSP IP/OBS DSCHRG MGMT 30/<: CPT | Mod: 24

## 2018-07-17 RX ORDER — TAMSULOSIN HYDROCHLORIDE 0.4 MG/1
0.4 CAPSULE ORAL AT BEDTIME
Qty: 0 | Refills: 0 | Status: DISCONTINUED | OUTPATIENT
Start: 2018-07-17 | End: 2018-07-17

## 2018-07-17 RX ORDER — OXYCODONE HYDROCHLORIDE 5 MG/1
1 TABLET ORAL
Qty: 30 | Refills: 0
Start: 2018-07-17

## 2018-07-17 RX ORDER — ASPIRIN/CALCIUM CARB/MAGNESIUM 324 MG
1 TABLET ORAL
Qty: 0 | Refills: 0 | COMMUNITY

## 2018-07-17 RX ORDER — METHOCARBAMOL 500 MG/1
1 TABLET, FILM COATED ORAL
Qty: 30 | Refills: 0
Start: 2018-07-17

## 2018-07-17 RX ADMIN — Medication 100 MILLIGRAM(S): at 03:40

## 2018-07-17 RX ADMIN — Medication 100 MILLIGRAM(S): at 14:32

## 2018-07-17 RX ADMIN — PANTOPRAZOLE SODIUM 40 MILLIGRAM(S): 20 TABLET, DELAYED RELEASE ORAL at 05:58

## 2018-07-17 RX ADMIN — Medication 1 TABLET(S): at 11:39

## 2018-07-17 RX ADMIN — Medication 100 MILLIGRAM(S): at 05:58

## 2018-07-17 RX ADMIN — LOSARTAN POTASSIUM 100 MILLIGRAM(S): 100 TABLET, FILM COATED ORAL at 05:58

## 2018-07-17 RX ADMIN — OXYCODONE HYDROCHLORIDE 5 MILLIGRAM(S): 5 TABLET ORAL at 00:10

## 2018-07-17 NOTE — DISCHARGE NOTE ADULT - CARE PROVIDER_API CALL
Carter Rowell; PhD), Neurosurgery  [address]  [address] [place] [address]  Phone: [phone]  Fax: [phone]

## 2018-07-17 NOTE — DISCHARGE NOTE ADULT - PLAN OF CARE
return to normal function Monitor incision for signs of infection including redness, swelling, and discharge. You may shower but do not let water 'beat' directly on incision. Gradually increase activities / walking. Continue to use your incentive spirometer. Any new numbness / tingling / weakness, any change in character or intensity of pain, or any sign of infection - call Dr Rowell's office or visit the closet emergency room

## 2018-07-17 NOTE — DISCHARGE NOTE ADULT - HOSPITAL COURSE
85 yo male status post L2/3, L3/4 unilateral laminotomy / foraminotomy. Postoperative course complicated by episode of urinary retention requiring placement of Ramirez catheter. Ramirez now removed and patient voiding freely. Now stable for discharge home with close outpatient followup

## 2018-07-17 NOTE — DISCHARGE NOTE ADULT - PATIENT PORTAL LINK FT
You can access the [facility] Patient Portal, offered by [facility], by registering with the following website: [email or web address]

## 2018-07-17 NOTE — DISCHARGE NOTE ADULT - MEDICATION SUMMARY - MEDICATIONS TO TAKE
I will START or STAY ON the medications listed below when I get home from the hospital:    Aleve 220 mg oral tablet  -- 1 tab(s) by mouth every 8 hours, As Needed-stop 7 days pre-op  -- Indication: For As needed for mild pain    oxyCODONE 5 mg oral tablet  -- 1 tab(s) by mouth every 6 hours, As Needed- for moderate pain MDD:4  -- Indication: For As needed for moderate pain    Avapro 300 mg oral tablet  -- 1 tab(s) by mouth once a day  -- Indication: For Home medication -  hypertension    Crestor 5 mg oral tablet  -- 1 tab(s) by mouth once a day AM  -- Indication: For Home medication - dyslipidemia    methocarbamol 500 mg oral tablet  -- 1 tab(s) by mouth every 6 hours, As needed, Back Spasms  -- Indication: For As needed for muscle spasms    CoQ10 300 mg oral capsule  -- 1 cap(s) by mouth once a day  -- Indication: For Home medication - supplement    omeprazole 40 mg oral delayed release capsule  -- 1 cap(s) by mouth once a day  -- Indication: For Home medication - GERD

## 2018-07-17 NOTE — DISCHARGE NOTE ADULT - CARE PLAN
Principal Discharge DX:	Lumbar radiculopathy  Goal:	return to normal function  Assessment and plan of treatment:	Monitor incision for signs of infection including redness, swelling, and discharge. You may shower but do not let water 'beat' directly on incision. Gradually increase activities / walking. Continue to use your incentive spirometer. Any new numbness / tingling / weakness, any change in character or intensity of pain, or any sign of infection - call Dr Rowell's office or visit the closet emergency room

## 2018-07-19 ENCOUNTER — APPOINTMENT (OUTPATIENT)
Dept: NEUROLOGY | Facility: CLINIC | Age: 83
End: 2018-07-19

## 2018-07-25 PROBLEM — M54.16 RADICULOPATHY, LUMBAR REGION: Chronic | Status: ACTIVE | Noted: 2018-07-11

## 2018-07-25 PROBLEM — C43.9 MALIGNANT MELANOMA OF SKIN, UNSPECIFIED: Chronic | Status: ACTIVE | Noted: 2018-07-11

## 2018-07-25 PROBLEM — N40.0 BENIGN PROSTATIC HYPERPLASIA WITHOUT LOWER URINARY TRACT SYMPTOMS: Chronic | Status: ACTIVE | Noted: 2018-07-11

## 2018-07-25 PROBLEM — F41.9 ANXIETY DISORDER, UNSPECIFIED: Chronic | Status: ACTIVE | Noted: 2018-07-11

## 2018-07-25 PROBLEM — M48.061 SPINAL STENOSIS, LUMBAR REGION WITHOUT NEUROGENIC CLAUDICATION: Chronic | Status: ACTIVE | Noted: 2018-07-11

## 2018-07-25 PROBLEM — M21.372 FOOT DROP, LEFT FOOT: Chronic | Status: ACTIVE | Noted: 2017-02-10

## 2018-07-25 PROBLEM — R25.1 TREMOR, UNSPECIFIED: Chronic | Status: ACTIVE | Noted: 2018-07-11

## 2018-07-25 PROBLEM — I25.810 ATHEROSCLEROSIS OF CORONARY ARTERY BYPASS GRAFT(S) WITHOUT ANGINA PECTORIS: Chronic | Status: ACTIVE | Noted: 2018-07-11

## 2018-07-25 PROBLEM — Z95.5 PRESENCE OF CORONARY ANGIOPLASTY IMPLANT AND GRAFT: Chronic | Status: ACTIVE | Noted: 2018-07-11

## 2018-07-25 PROBLEM — M54.9 DORSALGIA, UNSPECIFIED: Chronic | Status: ACTIVE | Noted: 2018-07-11

## 2018-07-26 DIAGNOSIS — K21.9 GASTRO-ESOPHAGEAL REFLUX DISEASE WITHOUT ESOPHAGITIS: ICD-10-CM

## 2018-07-26 DIAGNOSIS — R35.0 FREQUENCY OF MICTURITION: ICD-10-CM

## 2018-07-26 DIAGNOSIS — Z79.82 LONG TERM (CURRENT) USE OF ASPIRIN: ICD-10-CM

## 2018-07-26 DIAGNOSIS — L57.0 ACTINIC KERATOSIS: ICD-10-CM

## 2018-07-26 DIAGNOSIS — N40.1 BENIGN PROSTATIC HYPERPLASIA WITH LOWER URINARY TRACT SYMPTOMS: ICD-10-CM

## 2018-07-26 DIAGNOSIS — M48.062 SPINAL STENOSIS, LUMBAR REGION WITH NEUROGENIC CLAUDICATION: ICD-10-CM

## 2018-07-26 DIAGNOSIS — Z95.1 PRESENCE OF AORTOCORONARY BYPASS GRAFT: ICD-10-CM

## 2018-07-26 DIAGNOSIS — Z96.1 PRESENCE OF INTRAOCULAR LENS: ICD-10-CM

## 2018-07-26 DIAGNOSIS — F41.9 ANXIETY DISORDER, UNSPECIFIED: ICD-10-CM

## 2018-07-26 DIAGNOSIS — M54.16 RADICULOPATHY, LUMBAR REGION: ICD-10-CM

## 2018-07-26 DIAGNOSIS — Z98.49 CATARACT EXTRACTION STATUS, UNSPECIFIED EYE: ICD-10-CM

## 2018-07-26 DIAGNOSIS — I10 ESSENTIAL (PRIMARY) HYPERTENSION: ICD-10-CM

## 2018-07-26 DIAGNOSIS — I45.10 UNSPECIFIED RIGHT BUNDLE-BRANCH BLOCK: ICD-10-CM

## 2018-07-26 DIAGNOSIS — N39.43 POST-VOID DRIBBLING: ICD-10-CM

## 2018-07-26 DIAGNOSIS — R25.1 TREMOR, UNSPECIFIED: ICD-10-CM

## 2018-07-26 DIAGNOSIS — E78.5 HYPERLIPIDEMIA, UNSPECIFIED: ICD-10-CM

## 2018-07-26 DIAGNOSIS — Z87.891 PERSONAL HISTORY OF NICOTINE DEPENDENCE: ICD-10-CM

## 2018-07-26 DIAGNOSIS — Z85.820 PERSONAL HISTORY OF MALIGNANT MELANOMA OF SKIN: ICD-10-CM

## 2018-07-26 DIAGNOSIS — I25.810 ATHEROSCLEROSIS OF CORONARY ARTERY BYPASS GRAFT(S) WITHOUT ANGINA PECTORIS: ICD-10-CM

## 2018-07-26 DIAGNOSIS — Z95.5 PRESENCE OF CORONARY ANGIOPLASTY IMPLANT AND GRAFT: ICD-10-CM

## 2018-07-26 DIAGNOSIS — R35.1 NOCTURIA: ICD-10-CM

## 2018-07-31 ENCOUNTER — APPOINTMENT (OUTPATIENT)
Dept: NEUROSURGERY | Facility: CLINIC | Age: 83
End: 2018-07-31
Payer: MEDICARE

## 2018-07-31 VITALS
SYSTOLIC BLOOD PRESSURE: 127 MMHG | WEIGHT: 175 LBS | BODY MASS INDEX: 25.05 KG/M2 | HEIGHT: 70 IN | TEMPERATURE: 97.8 F | RESPIRATION RATE: 16 BRPM | HEART RATE: 79 BPM | DIASTOLIC BLOOD PRESSURE: 74 MMHG

## 2018-07-31 DIAGNOSIS — M48.062 SPINAL STENOSIS, LUMBAR REGION WITH NEUROGENIC CLAUDICATION: ICD-10-CM

## 2018-07-31 PROCEDURE — 99024 POSTOP FOLLOW-UP VISIT: CPT

## 2018-07-31 RX ORDER — METHYLPREDNISOLONE 4 MG/1
4 TABLET ORAL
Qty: 2 | Refills: 0 | Status: DISCONTINUED | COMMUNITY
Start: 2018-06-28 | End: 2018-07-31

## 2018-07-31 RX ORDER — BUSPIRONE HYDROCHLORIDE 15 MG/1
15 TABLET ORAL
Qty: 180 | Refills: 0 | Status: DISCONTINUED | COMMUNITY
Start: 2017-06-10 | End: 2018-07-31

## 2018-07-31 RX ORDER — GABAPENTIN 100 MG/1
100 CAPSULE ORAL 3 TIMES DAILY
Qty: 30 | Refills: 0 | Status: DISCONTINUED | COMMUNITY
Start: 2018-06-28 | End: 2018-07-31

## 2018-08-03 ENCOUNTER — EMERGENCY (EMERGENCY)
Facility: HOSPITAL | Age: 83
LOS: 0 days | Discharge: HOME | End: 2018-08-03
Attending: EMERGENCY MEDICINE | Admitting: EMERGENCY MEDICINE
Payer: MEDICARE

## 2018-08-03 VITALS
SYSTOLIC BLOOD PRESSURE: 146 MMHG | DIASTOLIC BLOOD PRESSURE: 80 MMHG | RESPIRATION RATE: 18 BRPM | WEIGHT: 195.11 LBS | OXYGEN SATURATION: 100 % | HEART RATE: 69 BPM | TEMPERATURE: 98 F

## 2018-08-03 DIAGNOSIS — S09.90XA UNSPECIFIED INJURY OF HEAD, INITIAL ENCOUNTER: ICD-10-CM

## 2018-08-03 DIAGNOSIS — Z95.1 PRESENCE OF AORTOCORONARY BYPASS GRAFT: Chronic | ICD-10-CM

## 2018-08-03 DIAGNOSIS — Z79.899 OTHER LONG TERM (CURRENT) DRUG THERAPY: ICD-10-CM

## 2018-08-03 DIAGNOSIS — Z90.49 ACQUIRED ABSENCE OF OTHER SPECIFIED PARTS OF DIGESTIVE TRACT: Chronic | ICD-10-CM

## 2018-08-03 DIAGNOSIS — Z79.82 LONG TERM (CURRENT) USE OF ASPIRIN: ICD-10-CM

## 2018-08-03 DIAGNOSIS — M21.372 FOOT DROP, LEFT FOOT: ICD-10-CM

## 2018-08-03 DIAGNOSIS — Z98.49 CATARACT EXTRACTION STATUS, UNSPECIFIED EYE: Chronic | ICD-10-CM

## 2018-08-03 DIAGNOSIS — I25.10 ATHEROSCLEROTIC HEART DISEASE OF NATIVE CORONARY ARTERY WITHOUT ANGINA PECTORIS: ICD-10-CM

## 2018-08-03 DIAGNOSIS — K21.9 GASTRO-ESOPHAGEAL REFLUX DISEASE WITHOUT ESOPHAGITIS: ICD-10-CM
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
## 2018-08-03 DIAGNOSIS — Z41.9 ENCOUNTER FOR PROCEDURE FOR PURPOSES OTHER THAN REMEDYING HEALTH STATE, UNSPECIFIED: Chronic | ICD-10-CM

## 2018-08-03 DIAGNOSIS — E78.4 OTHER HYPERLIPIDEMIA: ICD-10-CM

## 2018-08-03 DIAGNOSIS — Z09 ENCOUNTER FOR FOLLOW-UP EXAMINATION AFTER COMPLETED TREATMENT FOR CONDITIONS OTHER THAN MALIGNANT NEOPLASM: ICD-10-CM

## 2018-08-03 PROCEDURE — 72125 CT NECK SPINE W/O DYE: CPT | Mod: 26

## 2018-08-03 PROCEDURE — 70450 CT HEAD/BRAIN W/O DYE: CPT | Mod: 26

## 2018-08-03 PROCEDURE — 99284 EMERGENCY DEPT VISIT MOD MDM: CPT | Mod: 25

## 2018-08-03 RX ORDER — ACETAMINOPHEN 500 MG
1000 TABLET ORAL ONCE
Qty: 0 | Refills: 0 | Status: COMPLETED | OUTPATIENT
Start: 2018-08-03 | End: 2018-08-03

## 2018-08-03 RX ADMIN — Medication 1000 MILLIGRAM(S): at 03:27

## 2018-08-03 NOTE — ED PROVIDER NOTE - CONSTITUTIONAL, MLM
normal... Well appearing, well nourished, awake, alert, oriented to person, place, time/situation and in no apparent distress. HEAD:  Small hematoma on left frontal scalp and forehead

## 2018-08-03 NOTE — ED PROVIDER NOTE - OBJECTIVE STATEMENT
Pt. is an 85 yo M with a hx of CAD on aspirin daily, BPH, GERD, hyperlipidemia, left foot weakness and foot drop requiring lumbar spinal decompression surgery last month BIB ambulance after stumbling in the dark on his way to the bathroom.  Pt. states due to his left foot brace and weakness he lost balance and stumbled, falling from standing position and hitting head on the hard floor.  +bump on left forehead and dull headache.  Pt. woke up his 24/7 nursing aide to let her know he fell and hit his head and has a headache.  Pt. denies neck pain, back pain, pain in extremities. Pt. ambulated with assistance after the fall. No LOC, vomiting, dizziness, chest pain, or trouble breathing.

## 2018-08-03 NOTE — ED ADULT NURSE NOTE - OBJECTIVE STATEMENT
Pt presents to ED s/p fall at home on asa onto wood floor, denies LOC, chest pain, SOB.  No evidence of lacerations or trauma to area.

## 2018-08-03 NOTE — ED PROVIDER NOTE - MEDICAL DECISION MAKING DETAILS
Mechanical fall: CTs of head and C spine ordered and if negative patient will return home with 24/7 aide.

## 2018-08-10 ENCOUNTER — NON-APPOINTMENT (OUTPATIENT)
Age: 83
End: 2018-08-10

## 2018-08-10 ENCOUNTER — APPOINTMENT (OUTPATIENT)
Dept: ELECTROPHYSIOLOGY | Facility: CLINIC | Age: 83
End: 2018-08-10
Payer: MEDICARE

## 2018-08-10 VITALS
HEIGHT: 70 IN | DIASTOLIC BLOOD PRESSURE: 85 MMHG | SYSTOLIC BLOOD PRESSURE: 145 MMHG | BODY MASS INDEX: 25.05 KG/M2 | WEIGHT: 175 LBS | HEART RATE: 64 BPM

## 2018-08-10 DIAGNOSIS — I10 ESSENTIAL (PRIMARY) HYPERTENSION: ICD-10-CM

## 2018-08-10 PROCEDURE — 93000 ELECTROCARDIOGRAM COMPLETE: CPT

## 2018-08-10 PROCEDURE — 99214 OFFICE O/P EST MOD 30 MIN: CPT

## 2018-08-13 ENCOUNTER — RX RENEWAL (OUTPATIENT)
Age: 83
End: 2018-08-13

## 2018-08-28 ENCOUNTER — APPOINTMENT (OUTPATIENT)
Dept: NEUROSURGERY | Facility: CLINIC | Age: 83
End: 2018-08-28
Payer: MEDICARE

## 2018-08-28 VITALS
BODY MASS INDEX: 27.75 KG/M2 | SYSTOLIC BLOOD PRESSURE: 148 MMHG | DIASTOLIC BLOOD PRESSURE: 75 MMHG | HEART RATE: 88 BPM | HEIGHT: 67.72 IN | RESPIRATION RATE: 17 BRPM | WEIGHT: 181 LBS

## 2018-08-28 DIAGNOSIS — Z87.39 PERSONAL HISTORY OF OTHER DISEASES OF THE MUSCULOSKELETAL SYSTEM AND CONNECTIVE TISSUE: ICD-10-CM

## 2018-08-28 PROCEDURE — 99024 POSTOP FOLLOW-UP VISIT: CPT

## 2018-09-25 ENCOUNTER — APPOINTMENT (OUTPATIENT)
Dept: DERMATOLOGY | Facility: CLINIC | Age: 83
End: 2018-09-25
Payer: MEDICARE

## 2018-09-25 VITALS — BODY MASS INDEX: 28.41 KG/M2 | HEIGHT: 67 IN | WEIGHT: 181 LBS

## 2018-09-25 PROCEDURE — 17000 DESTRUCT PREMALG LESION: CPT

## 2018-09-25 PROCEDURE — 99213 OFFICE O/P EST LOW 20 MIN: CPT | Mod: 25

## 2018-09-25 PROCEDURE — 17003 DESTRUCT PREMALG LES 2-14: CPT

## 2018-09-25 RX ORDER — GABAPENTIN 100 MG/1
100 CAPSULE ORAL 3 TIMES DAILY
Qty: 30 | Refills: 0 | Status: DISCONTINUED | COMMUNITY
Start: 2018-08-28 | End: 2018-09-25

## 2018-09-26 ENCOUNTER — TRANSCRIPTION ENCOUNTER (OUTPATIENT)
Age: 83
End: 2018-09-26

## 2018-10-01 ENCOUNTER — EMERGENCY (EMERGENCY)
Facility: HOSPITAL | Age: 83
LOS: 0 days | Discharge: HOME | End: 2018-10-01
Attending: EMERGENCY MEDICINE
Payer: MEDICARE

## 2018-10-01 VITALS
TEMPERATURE: 98 F | OXYGEN SATURATION: 99 % | RESPIRATION RATE: 16 BRPM | HEIGHT: 69 IN | WEIGHT: 175.05 LBS | SYSTOLIC BLOOD PRESSURE: 138 MMHG | DIASTOLIC BLOOD PRESSURE: 73 MMHG | HEART RATE: 83 BPM

## 2018-10-01 DIAGNOSIS — Z95.5 PRESENCE OF CORONARY ANGIOPLASTY IMPLANT AND GRAFT: ICD-10-CM
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
## 2018-10-01 DIAGNOSIS — I25.10 ATHEROSCLEROTIC HEART DISEASE OF NATIVE CORONARY ARTERY WITHOUT ANGINA PECTORIS: ICD-10-CM

## 2018-10-01 DIAGNOSIS — Z41.9 ENCOUNTER FOR PROCEDURE FOR PURPOSES OTHER THAN REMEDYING HEALTH STATE, UNSPECIFIED: Chronic | ICD-10-CM

## 2018-10-01 DIAGNOSIS — N40.0 BENIGN PROSTATIC HYPERPLASIA WITHOUT LOWER URINARY TRACT SYMPTOMS: ICD-10-CM

## 2018-10-01 DIAGNOSIS — S51.012A LACERATION WITHOUT FOREIGN BODY OF LEFT ELBOW, INITIAL ENCOUNTER: ICD-10-CM

## 2018-10-01 DIAGNOSIS — Z90.49 ACQUIRED ABSENCE OF OTHER SPECIFIED PARTS OF DIGESTIVE TRACT: Chronic | ICD-10-CM

## 2018-10-01 DIAGNOSIS — Z95.1 PRESENCE OF AORTOCORONARY BYPASS GRAFT: ICD-10-CM

## 2018-10-01 DIAGNOSIS — K21.9 GASTRO-ESOPHAGEAL REFLUX DISEASE WITHOUT ESOPHAGITIS: ICD-10-CM
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
## 2018-10-01 DIAGNOSIS — Z95.1 PRESENCE OF AORTOCORONARY BYPASS GRAFT: Chronic | ICD-10-CM

## 2018-10-01 DIAGNOSIS — Z98.49 CATARACT EXTRACTION STATUS, UNSPECIFIED EYE: Chronic | ICD-10-CM

## 2018-10-01 DIAGNOSIS — M21.372 FOOT DROP, LEFT FOOT: ICD-10-CM

## 2018-10-01 DIAGNOSIS — I10 ESSENTIAL (PRIMARY) HYPERTENSION: ICD-10-CM

## 2018-10-01 LAB
ALBUMIN SERPL ELPH-MCNC: 3.6 G/DL — SIGNIFICANT CHANGE UP (ref 3.3–5)
ALP SERPL-CCNC: 86 U/L — SIGNIFICANT CHANGE UP (ref 40–120)
ALT FLD-CCNC: 24 U/L — SIGNIFICANT CHANGE UP (ref 12–78)
ANION GAP SERPL CALC-SCNC: 6 MMOL/L — SIGNIFICANT CHANGE UP (ref 5–17)
APTT BLD: 31.3 SEC — SIGNIFICANT CHANGE UP (ref 27.5–37.4)
AST SERPL-CCNC: 22 U/L — SIGNIFICANT CHANGE UP (ref 15–37)
BASOPHILS # BLD AUTO: 0.04 K/UL — SIGNIFICANT CHANGE UP (ref 0–0.2)
BASOPHILS NFR BLD AUTO: 0.4 % — SIGNIFICANT CHANGE UP (ref 0–2)
BILIRUB SERPL-MCNC: 0.4 MG/DL — SIGNIFICANT CHANGE UP (ref 0.2–1.2)
BUN SERPL-MCNC: 21 MG/DL — SIGNIFICANT CHANGE UP (ref 7–23)
CALCIUM SERPL-MCNC: 9.6 MG/DL — SIGNIFICANT CHANGE UP (ref 8.5–10.1)
CHLORIDE SERPL-SCNC: 103 MMOL/L — SIGNIFICANT CHANGE UP (ref 96–108)
CK SERPL-CCNC: 141 U/L — SIGNIFICANT CHANGE UP (ref 26–308)
CO2 SERPL-SCNC: 29 MMOL/L — SIGNIFICANT CHANGE UP (ref 22–31)
CREAT SERPL-MCNC: 1.01 MG/DL — SIGNIFICANT CHANGE UP (ref 0.5–1.3)
EOSINOPHIL # BLD AUTO: 0.02 K/UL — SIGNIFICANT CHANGE UP (ref 0–0.5)
EOSINOPHIL NFR BLD AUTO: 0.2 % — SIGNIFICANT CHANGE UP (ref 0–6)
GLUCOSE SERPL-MCNC: 133 MG/DL — HIGH (ref 70–99)
HCT VFR BLD CALC: 39.5 % — SIGNIFICANT CHANGE UP (ref 39–50)
HGB BLD-MCNC: 13.6 G/DL — SIGNIFICANT CHANGE UP (ref 13–17)
IMM GRANULOCYTES NFR BLD AUTO: 0.4 % — SIGNIFICANT CHANGE UP (ref 0–1.5)
INR BLD: 1.03 RATIO — SIGNIFICANT CHANGE UP (ref 0.88–1.16)
LYMPHOCYTES # BLD AUTO: 0.79 K/UL — LOW (ref 1–3.3)
LYMPHOCYTES # BLD AUTO: 7.1 % — LOW (ref 13–44)
MCHC RBC-ENTMCNC: 31.1 PG — SIGNIFICANT CHANGE UP (ref 27–34)
MCHC RBC-ENTMCNC: 34.4 GM/DL — SIGNIFICANT CHANGE UP (ref 32–36)
MCV RBC AUTO: 90.4 FL — SIGNIFICANT CHANGE UP (ref 80–100)
MONOCYTES # BLD AUTO: 0.51 K/UL — SIGNIFICANT CHANGE UP (ref 0–0.9)
MONOCYTES NFR BLD AUTO: 4.6 % — SIGNIFICANT CHANGE UP (ref 2–14)
NEUTROPHILS # BLD AUTO: 9.73 K/UL — HIGH (ref 1.8–7.4)
NEUTROPHILS NFR BLD AUTO: 87.3 % — HIGH (ref 43–77)
PLATELET # BLD AUTO: 182 K/UL — SIGNIFICANT CHANGE UP (ref 150–400)
POTASSIUM SERPL-MCNC: 4.9 MMOL/L — SIGNIFICANT CHANGE UP (ref 3.5–5.3)
POTASSIUM SERPL-SCNC: 4.9 MMOL/L — SIGNIFICANT CHANGE UP (ref 3.5–5.3)
PROT SERPL-MCNC: 7 GM/DL — SIGNIFICANT CHANGE UP (ref 6–8.3)
PROTHROM AB SERPL-ACNC: 11.1 SEC — SIGNIFICANT CHANGE UP (ref 9.8–12.7)
RBC # BLD: 4.37 M/UL — SIGNIFICANT CHANGE UP (ref 4.2–5.8)
RBC # FLD: 12.6 % — SIGNIFICANT CHANGE UP (ref 10.3–14.5)
SODIUM SERPL-SCNC: 138 MMOL/L — SIGNIFICANT CHANGE UP (ref 135–145)
TROPONIN I SERPL-MCNC: <0.015 NG/ML — SIGNIFICANT CHANGE UP (ref 0.01–0.04)
WBC # BLD: 11.14 K/UL — HIGH (ref 3.8–10.5)
WBC # FLD AUTO: 11.14 K/UL — HIGH (ref 3.8–10.5)

## 2018-10-01 PROCEDURE — 93010 ELECTROCARDIOGRAM REPORT: CPT

## 2018-10-01 PROCEDURE — 72125 CT NECK SPINE W/O DYE: CPT | Mod: 26

## 2018-10-01 PROCEDURE — 99284 EMERGENCY DEPT VISIT MOD MDM: CPT

## 2018-10-01 PROCEDURE — 70450 CT HEAD/BRAIN W/O DYE: CPT | Mod: 26

## 2018-10-01 PROCEDURE — 12001 RPR S/N/AX/GEN/TRNK 2.5CM/<: CPT

## 2018-10-01 PROCEDURE — 71045 X-RAY EXAM CHEST 1 VIEW: CPT | Mod: 26

## 2018-10-01 RX ORDER — TETANUS TOXOID, REDUCED DIPHTHERIA TOXOID AND ACELLULAR PERTUSSIS VACCINE, ADSORBED 5; 2.5; 8; 8; 2.5 [IU]/.5ML; [IU]/.5ML; UG/.5ML; UG/.5ML; UG/.5ML
0.5 SUSPENSION INTRAMUSCULAR ONCE
Qty: 0 | Refills: 0 | Status: COMPLETED | OUTPATIENT
Start: 2018-10-01 | End: 2018-10-01

## 2018-10-01 RX ADMIN — TETANUS TOXOID, REDUCED DIPHTHERIA TOXOID AND ACELLULAR PERTUSSIS VACCINE, ADSORBED 0.5 MILLILITER(S): 5; 2.5; 8; 8; 2.5 SUSPENSION INTRAMUSCULAR at 14:34

## 2018-10-01 NOTE — ED PROVIDER NOTE - NS ED ROS FT
Constitutional: No fever or chills  Eyes: No visual changes  HEENT: No throat pain  CV: No chest pain  Resp: No SOB no cough  GI: No abd pain, nausea or vomiting  : No dysuria  MSK: +left elbow pain  Skin: +laceration  Neuro: No headache

## 2018-10-01 NOTE — ED PROVIDER NOTE - NS_ ATTENDINGSCRIBEDETAILS _ED_A_ED_FT
I, Del Braxton MD,  performed the initial face to face bedside interview with this patient regarding history of present illness, review of symptoms and relevant past medical, social and family history.  I completed an independent physical examination.  I was the initial provider who evaluated this patient.  The history, relevant review of systems, past medical and surgical history, medical decision making, and physical examination was documented by the scribe in my presence and I attest to the accuracy of the documentation.

## 2018-10-01 NOTE — ED PROVIDER NOTE - PHYSICAL EXAMINATION
Constitutional: mild distress AAOx3  Eyes: PERRLA EOMI  Head: Normocephalic atraumatic  Mouth: MMM  Cardiac: regular rate   Resp: Lungs CTAB  GI: Abd s/nt/nd  Neuro: CN2-12 intact  MSK: No midline spinal TTP. No facial TTP. Full ROM of left elbow. Pelvis: stable. No pain in hips with axial loading.   Skin: No rashes. +small 0.5cm laceration to left elbow without TTP. Constitutional: NAD AAOx3  Eyes: PERRLA EOMI  Head: Normocephalic atraumatic  Mouth: MMM  Cardiac: regular rate normal p eripheral pulses  Resp: Lungs CTAB  GI: Abd s/nt/nd  Neuro: CN2-12 intact  MSK: No midline spinal TTP. No facial TTP. Full ROM of left elbow. Pelvis: stable. No pain in hips with axial loading.   Skin: No rashes. +small 0.5cm laceration to left elbow without TTP.

## 2018-10-01 NOTE — CONSULT NOTE ADULT - ASSESSMENT
ASSESSMENT & PLAN:  86 year old male who is S/P recent spinal surgery.  He has dropped foot and isn't compliant using walker or taking his  medications routinely  He resumed his Flomax yesterday and took Ativan. He has been feeling well and denies any SOB, CP or fluttering in his chest at the time of this incident.  His cardiac enzymes are negative and EKG unchanged form last visit.   His rhythm on the monitor is SR.    Orthostatic Vitals Signs   Will arrange for 21 day out patient monitor and follow-up with Dr. Lopez

## 2018-10-01 NOTE — CONSULT NOTE ADULT - SUBJECTIVE AND OBJECTIVE BOX
86y Male who was brought to the ED for possible mechanical  fall verus mechanical fall.  This patient had spinal surgery approximately 10 weeks ago and has drop foot.  He is not always compliant with using his walker or taking his medications.  He states that yesterday he took Flomax for the first time in weeks and took an Ativan last evening.  He got up from sitting and the next thing he said he was on the floor.  He wasn't using his walker.  He occasionally get palpitations but doesn't recall any symptoms yesterday.   He has a history of RBBB and CABG.  He has been seen by the EP team for out patient follow up.  He denies any lightheadedness, SOB or previous syncope.  He lives in a private home and has a home health aid who did not witness the event today.    He denies any urinary burning or recent change in physical health since last seen in the EP office on August 10,2018.  He follows with Dr. Carr routinely.     PAST MEDICAL & SURGICAL HISTORY:  Back pain  CAD (coronary artery disease) of artery bypass graft  Anxiety  Melanoma  Stented coronary artery: 2004  Lumbar stenosis  Lumbar radiculopathy  BPH (benign prostatic hyperplasia)  Tremors of nervous system  Foot drop, left  GERD (gastroesophageal reflux disease)  HTN - Hypertension  H/O hyperlipidemia  Elective surgery: excision of melanoma  History of cataract surgery: IOL  History of cholecystectomy: 2015  S/P CABG x 3: 2013  S/P colonoscopy  S/P discectomy for herniated nucleus pulposus: 2000, 2002 with left dropped foot      MEDICATIONS  (STANDING):    MEDICATIONS  (PRN):      Allergies    No Known Allergies    Intolerances        SOCIAL HISTORY: Denies tobacco, etoh abuse or illicit drug use    FAMILY HISTORY:  No pertinent family history in first degree relatives      Vital Signs Last 24 Hrs  T(C): 36.6 (01 Oct 2018 13:03), Max: 36.6 (01 Oct 2018 13:03)  T(F): 97.8 (01 Oct 2018 13:03), Max: 97.8 (01 Oct 2018 13:03)  HR: 83 (01 Oct 2018 13:03) (83 - 83)  BP: 138/73 (01 Oct 2018 13:03) (138/73 - 138/73)  BP(mean): --  RR: 16 (01 Oct 2018 13:03) (16 - 16)  SpO2: 99% (01 Oct 2018 13:03) (99% - 99%)    REVIEW OF SYSTEMS:    CONSTITUTIONAL:  As per HPI.  HEENT:  Eyes:  No diplopia or blurred vision. ENT:  No earache, sore throat or runny nose.  CARDIOVASCULAR:  No pressure, squeezing, strangling, tightness, heaviness or aching about the chest, neck, axilla or epigastrium.  RESPIRATORY:  No cough, shortness of breath, PND or orthopnea.  GASTROINTESTINAL:  No nausea, vomiting or diarrhea.  GENITOURINARY:  No dysuria, frequency or urgency.  MUSCULOSKELETAL:  As per HPI.  SKIN:  No change in skin, hair or nails.  NEUROLOGIC:  No paresthesias, fasciculations, seizures or weakness.  PSYCHIATRIC:  No disorder of thought or mood.  ENDOCRINE:  No heat or cold intolerance, polyuria or polydipsia.  HEMATOLOGICAL:  No easy bruising or bleedings:  .     PHYSICAL EXAMINATION:    GENERAL APPEARANCE:  Pt. is not currently dyspneic, in no distress. Pt. is alert, oriented, and pleasant.  HEENT:  Pupils are normal and react normally. No icterus. Mucous membranes well colored.  NECK:  Supple. No lymphadenopathy. Jugular venous pressure not elevated. Carotids equal.   HEART:   The cardiac impulse has a normal quality. There are no murmurs, rubs or gallops noted  CHEST:  Chest is clear to auscultation. Normal respiratory effort.  ABDOMEN:  Soft and nontender.   EXTREMITIES:  There is no edema.   SKIN:  No rash or significant lesions are noted.    I&O's Summary      LABS:                        13.6   11.14 )-----------( 182      ( 01 Oct 2018 13:53 )             39.5     10-01    138  |  103  |  21  ----------------------------<  133<H>  4.9   |  29  |  1.01    Ca    9.6      01 Oct 2018 13:53    TPro  7.0  /  Alb  3.6  /  TBili  0.4  /  DBili  x   /  AST  22  /  ALT  24  /  AlkPhos  86  10-01    LIVER FUNCTIONS - ( 01 Oct 2018 13:53 )  Alb: 3.6 g/dL / Pro: 7.0 gm/dL / ALK PHOS: 86 U/L / ALT: 24 U/L / AST: 22 U/L / GGT: x           PT/INR - ( 01 Oct 2018 13:53 )   PT: 11.1 sec;   INR: 1.03 ratio         PTT - ( 01 Oct 2018 13:53 )  PTT:31.3 sec  CARDIAC MARKERS ( 01 Oct 2018 13:53 )  <0.015 ng/mL / x     / 141 U/L / x     / x                EKG: SR at 67 BPM, with LAD and RBBB (unchanged from december 2017 EKG).     TELEMETRY:  SR with BBB, rte 70 BPM    CARDIAC TESTS:    RADIOLOGY & ADDITIONAL STUDIES:

## 2018-10-01 NOTE — ED PROVIDER NOTE - OBJECTIVE STATEMENT
87 y/o Male with PMHx of HTN, HLD, s/p recent spinal surgery presents to the ED with home care aid s/p fall yesterday. Patient was walking and then suddenly turned and fell, landing on his left side. No head trauma. Pt lied on the ground for about 15 minutes, and had ? LOC, does not remember the fall. Pt now c/o mild left elbow pain, laceration. Denies CP, SOB, abd pain, pace maker, nausea, vomiting. Pt called Dr. Evangelista Carr who advised pt go to the ED. Patient states that he has back pain from surgery. Non-smoker Non- EtOH. NKDA. PMD: Dr. Carr. Cardio: Dr. Lopez. 85 y/o Male with PMHx of HTN, HLD, s/p recent spinal surgery presents to the ED with home care aid s/p fall yesterday. Patient was walking and then suddenly turned and fell, landing on his left side. No head trauma. Pt lied on the ground for about 15 minutes, No LOC. Pt now c/o mild left elbow pain, laceration. Denies CP, SOB, abd pain, pace maker, nausea, vomiting. Pt called Dr. Evangelista Carr who advised pt go to the ED. Patient states that he has back pain from surgery. Non-smoker Non- EtOH. NKDA. PMD: Dr. Carr. Cardio: Dr. Lopez.

## 2018-10-01 NOTE — ED PROVIDER NOTE - MEDICAL DECISION MAKING DETAILS
85 y/o male with a Hx of HTN, HLD presents to the ED s/p fall. Patient was with home care aid, was walking lost balance and landed on his left side. Was semi-unconscious, did not remember the fall and brought to ED. Patient called primary, Dr. Carr, and was told to come to the emergency department. +0.5cm laceration over left elbow, unremarkable otherwise. Will obtain cardiac enzymes, CT imaging and reassess.

## 2018-10-01 NOTE — ED PROVIDER NOTE - PROGRESS NOTE DETAILS
pt seen by Dr. Gasca team - agree that pt did not syncopize - no need for hospitalization. OK to d/c and will place pt on outpatient cardiac monitor. Trop neg. vss. ambulatory with assistance. Will d/c with follow up. Del Braxton M.D., Attending Physician

## 2018-10-01 NOTE — ED ADULT TRIAGE NOTE - CHIEF COMPLAINT QUOTE
pt felt weak this morning and fell to his side from a standing height. No head trauma No loc no anticoagulation.

## 2018-10-01 NOTE — ED ADULT NURSE NOTE - NSIMPLEMENTINTERV_GEN_ALL_ED
Implemented All Fall with Harm Risk Interventions:  [place] to call system. Call bell, personal items and telephone within reach. Instruct patient to call for assistance. Room bathroom lighting operational. Non-slip footwear when patient is off stretcher. Physically safe environment: no spills, clutter or unnecessary equipment. Stretcher in lowest position, wheels locked, appropriate side rails in place. Provide visual cue, wrist band, yellow gown, etc. Monitor gait and stability. Monitor for mental status changes and reorient to person, place, and time. Review medications for side effects contributing to fall risk. Reinforce activity limits and safety measures with patient and family. Provide visual clues: red socks.

## 2018-10-01 NOTE — ED PROVIDER NOTE - PLAN OF CARE
1. return for worsening symptoms or anything concerning to you  2. take all home meds as prescribed  3. follow up with your pmd call to make an appointment  4. have sutures removed in 10-14 days.   5. Take Tylenol 650 mg every 6 hours as needed for pain.  6. follow up with Dr. Herndon

## 2018-10-01 NOTE — ED PROVIDER NOTE - CARE PLAN
Principal Discharge DX:	Fall, initial encounter  Assessment and plan of treatment:	1. return for worsening symptoms or anything concerning to you  2. take all home meds as prescribed  3. follow up with your pmd call to make an appointment  4. have sutures removed in 10-14 days.   5. Take Tylenol 650 mg every 6 hours as needed for pain.  6. follow up with Dr. Herndon  Secondary Diagnosis:	Laceration of left elbow, initial encounter

## 2018-10-09 ENCOUNTER — APPOINTMENT (OUTPATIENT)
Dept: NEUROSURGERY | Facility: CLINIC | Age: 83
End: 2018-10-09
Payer: MEDICARE

## 2018-10-09 VITALS
WEIGHT: 178 LBS | DIASTOLIC BLOOD PRESSURE: 65 MMHG | RESPIRATION RATE: 16 BRPM | TEMPERATURE: 97.4 F | BODY MASS INDEX: 26.36 KG/M2 | SYSTOLIC BLOOD PRESSURE: 110 MMHG | HEART RATE: 81 BPM | HEIGHT: 68.9 IN

## 2018-10-09 DIAGNOSIS — S73.192A OTHER SPRAIN OF LEFT HIP, INITIAL ENCOUNTER: ICD-10-CM

## 2018-10-09 DIAGNOSIS — Z98.890 OTHER SPECIFIED POSTPROCEDURAL STATES: ICD-10-CM

## 2018-10-09 DIAGNOSIS — M25.562 PAIN IN LEFT KNEE: ICD-10-CM

## 2018-10-09 DIAGNOSIS — M25.559 PAIN IN UNSPECIFIED HIP: ICD-10-CM

## 2018-10-09 PROCEDURE — 99024 POSTOP FOLLOW-UP VISIT: CPT

## 2018-10-22 ENCOUNTER — APPOINTMENT (OUTPATIENT)
Dept: DERMATOLOGY | Facility: CLINIC | Age: 83
End: 2018-10-22
Payer: MEDICARE

## 2018-10-22 ENCOUNTER — APPOINTMENT (OUTPATIENT)
Dept: DERMATOLOGY | Facility: CLINIC | Age: 83
End: 2018-10-22

## 2018-10-22 PROCEDURE — D0097: CPT

## 2018-11-02 ENCOUNTER — APPOINTMENT (OUTPATIENT)
Dept: ELECTROPHYSIOLOGY | Facility: CLINIC | Age: 83
End: 2018-11-02
Payer: MEDICARE

## 2018-11-02 ENCOUNTER — NON-APPOINTMENT (OUTPATIENT)
Age: 83
End: 2018-11-02

## 2018-11-02 VITALS
DIASTOLIC BLOOD PRESSURE: 88 MMHG | WEIGHT: 175 LBS | HEIGHT: 68 IN | BODY MASS INDEX: 26.52 KG/M2 | SYSTOLIC BLOOD PRESSURE: 152 MMHG | HEART RATE: 66 BPM

## 2018-11-02 PROCEDURE — 99215 OFFICE O/P EST HI 40 MIN: CPT

## 2018-11-02 PROCEDURE — 93000 ELECTROCARDIOGRAM COMPLETE: CPT

## 2018-11-02 RX ORDER — OXYCODONE 5 MG/1
5 TABLET ORAL
Qty: 30 | Refills: 0 | Status: DISCONTINUED | COMMUNITY
Start: 2018-06-30 | End: 2018-11-02

## 2018-11-02 NOTE — ED ADULT TRIAGE NOTE - WEIGHT IN LBS
Subjective     History provided by:  Patient, nursing home and relative   used: No    Fall   Mechanism of injury: fall    Injury location:  Pelvis  Pelvic injury location:  L hip  Incident location:  senior care  Time since incident:  1 hour  Arrived directly from scene: no    Fall:     Fall occurred:  Standing    Impact surface:  Hard floor    Point of impact:  Unable to specify    Entrapped after fall: no    Protective equipment: none    Suspicion of alcohol use: no    Suspicion of drug use: no    Tetanus status:  Unknown  Prior to arrival data:     Bystander interventions:  None    Patient ambulatory at scene: no      Blood loss:  None    Responsiveness at scene:  Alert    Orientation at scene:  Person, place, situation and time    Loss of consciousness: no      Amnesic to event: no      Airway interventions:  None    Breathing interventions:  None    IV access status:  None    IO access:  None    Fluids administered:  None    Cardiac interventions:  None    Medications administered:  None    Immobilization:  None  Associated symptoms: no abdominal pain, no chest pain, no difficulty breathing, no nausea and no vomiting    Risk factors: no AICD, no asthma, no CABG, no COPD, no dialysis, no kidney disease and not pregnant        Review of Systems   Constitutional: Negative.    HENT: Negative.    Respiratory: Negative.    Cardiovascular: Negative.  Negative for chest pain.   Gastrointestinal: Negative.  Negative for abdominal pain, nausea and vomiting.   Genitourinary: Negative.    Musculoskeletal: Negative.    Skin: Negative.    Neurological: Negative.    Hematological: Negative.    Psychiatric/Behavioral: Negative.    All other systems reviewed and are negative.      Past Medical History:   Diagnosis Date   • Arthritis    • Dementia    • GERD (gastroesophageal reflux disease)    • Hypertension        No Known Allergies    Past Surgical History:   Procedure Laterality Date   • HIP TROCANTERIC  NAILING WITH INTRAMEDULLARY HIP SCREW Left 10/27/2018    Procedure: HIP TROCANTERIC NAILING SHORT WITH INTRAMEDULLARY HIP SCREW;  Surgeon: Juan Colon MD;  Location: [facility] OR;  Service: Orthopedics   • NO PAST SURGERIES N/A 10/27/2018    info from pt's daughter       History reviewed. No pertinent family history.    Social History     Social History   • Marital status:      Social History Main Topics   • Smoking status: Former Smoker     Years: 20.00     Types: Cigarettes, Pipe   • Smokeless tobacco: Never Used      Comment: hAS  BEEN QUIT FOR 30 = YRS   • Alcohol use No   • Drug use: No   • Sexual activity: Defer     Other Topics Concern   • Not on file       Prior to Admission medications    Medication Sig Start Date End Date Taking? Authorizing Provider   bisacodyl (DULCOLAX) 5 MG EC tablet Take 10 mg by mouth Daily As Needed for Constipation.    Gary Perez MD   carbamide peroxide (DEBROX) 6.5 % otic solution Administer 10 drops into both ears every night at bedtime.    Gary Perez MD   hydrochlorothiazide (HYDRODIURIL) 25 MG tablet Take 25 mg by mouth Daily.    Gary Perez MD   HYDROcodone-acetaminophen (NORCO) 5-325 MG per tablet Take 1 tablet by mouth Every 6 (Six) Hours As Needed for Moderate Pain  for up to 6 days. 10/31/18 11/6/18  Juan Colon MD   magnesium hydroxide (MILK OF MAGNESIA) 400 MG/5ML suspension Take 5 mL by mouth Daily As Needed for Constipation.    Gary Perez MD   polyethylene glycol (MIRALAX) packet Take 17 g by mouth Daily.    Gary Perez MD   QUEtiapine (SEROquel) 50 MG tablet Take 50 mg by mouth Every Night.    Gary Perez MD   vitamin B-12 (CYANOCOBALAMIN) 1000 MCG tablet Take 1,000 mcg by mouth Every 14 (Fourteen) Days. Fridays    Gary Perez MD   vitamin D (ERGOCALCIFEROL) 57097 units capsule capsule Take 50,000 Units by mouth 1 (One) Time Per Week. On Fridays    Gary Perez MD    warfarin (COUMADIN) 3 MG tablet 3mg po q day 10/31/18   Juan Colon MD       Medications   sodium chloride 0.9 % flush 10 mL (not administered)       Vitals:    11/01/18 2347   BP: 123/63   Pulse: 100   Resp:    Temp:    SpO2: 97%         Objective   Physical Exam   Constitutional: He is oriented to person, place, and time. He appears well-developed and well-nourished.   HENT:   Head: Normocephalic and atraumatic.   Neck: Normal range of motion. Neck supple.   Cardiovascular: Normal rate and regular rhythm.    Pulmonary/Chest: Effort normal and breath sounds normal.   Abdominal: Soft. Bowel sounds are normal.   Musculoskeletal: He exhibits tenderness.   Tender to palpation left upper thigh   Neurological: He is alert and oriented to person, place, and time.   Patient is drowsy but arouses to voice easily and answers questions appropriately.   Skin: Skin is warm and dry.   Psychiatric: He has a normal mood and affect. His behavior is normal.   Nursing note and vitals reviewed.      Procedures         Lab Results (last 24 hours)     Procedure Component Value Units Date/Time    CBC & Differential [[id number]] Collected:  11/01/18 2337    Specimen:  Blood Updated:  11/02/18 0006    Narrative:       The following orders were created for panel order CBC & Differential.  Procedure                               Abnormality         Status                     ---------                               -----------         ------                     CBC Auto Differential[[id number]]        Abnormal            Final result                 Please view results for these tests on the individual orders.    Comprehensive Metabolic Panel [[id number]]  (Abnormal) Collected:  11/01/18 2337    Specimen:  Blood Updated:  11/01/18 2357     Glucose 129 (H) mg/dL      BUN 26 (H) mg/dL      Creatinine 0.87 mg/dL      Sodium 135 mmol/L      Potassium 4.6 mmol/L      Chloride 94 (L) mmol/L      CO2 31.0 mmol/L      Calcium 8.6 mg/dL       Total Protein 5.9 (L) g/dL      Albumin 3.00 (L) g/dL      ALT (SGPT) 37 U/L      AST (SGOT) 47 (H) U/L      Alkaline Phosphatase 80 U/L      Total Bilirubin 1.2 (H) mg/dL      eGFR Non African Amer 83 mL/min/1.73      Globulin 2.9 gm/dL      A/G Ratio 1.0 (L) g/dL      BUN/Creatinine Ratio 29.9 (H)     Anion Gap 10.0 mmol/L     Narrative:       The MDRD GFR formula is only valid for adults with stable renal function between ages 18 and 70.    Protime-INR [[id number]]  (Abnormal) Collected:  11/01/18 2337    Specimen:  Blood Updated:  11/01/18 2355     Protime 18.2 (H) Seconds      INR 1.46 (H)    CBC Auto Differential [[id number]]  (Abnormal) Collected:  11/01/18 2337    Specimen:  Blood Updated:  11/02/18 0006     WBC 14.26 (H) 10*3/mm3      RBC 2.95 (L) 10*6/mm3      Hemoglobin 8.7 (L) g/dL      Hematocrit 26.1 (L) %      MCV 88.5 fL      MCH 29.5 pg      MCHC 33.3 g/dL      RDW 14.6 %      RDW-SD 46.7 fl      MPV 10.5 fL      Platelets 330 10*3/mm3      Neutrophil % 91.6 (H) %      Lymphocyte % 3.2 (L) %      Monocyte % 4.3 %      Eosinophil % 0.0 %      Basophil % 0.2 %      Immature Grans % 0.7 %      Neutrophils, Absolute 13.05 (H) 10*3/mm3      Lymphocytes, Absolute 0.46 (L) 10*3/mm3      Monocytes, Absolute 0.62 10*3/mm3      Eosinophils, Absolute 0.00 10*3/mm3      Basophils, Absolute 0.03 10*3/mm3      Immature Grans, Absolute 0.10 (H) 10*3/mm3      nRBC 0.0 /100 WBC           XR Femur 2 View Left    (Results Pending)       ED Course  ED Course as of Nov 02 0023 Fri Nov 02, 2018   0021 Review of his outside films do reveal a fracture of his femur around the prosthesis from his previous hip pinning.  We will repeat x-rays here to make sure we have good studies here.  I have spoken with Dr. Reed and with Dr. Valdez and we will admit.  [TR]      ED Course User Index  [TR] Alejandro Amaya Jr., MD          MDM  Number of Diagnoses or Management Options  Closed displaced comminuted fracture of shaft of left  femur, initial encounter (CMS/[facility]): new and requires workup     Amount and/or Complexity of Data Reviewed  Clinical lab tests: ordered and reviewed  Tests in the radiology section of CPT®: ordered and reviewed  Tests in the medicine section of CPT®: ordered and reviewed  Decide to obtain previous medical records or to obtain history from someone other than the patient: yes  Discuss the patient with other providers: yes    Risk of Complications, Morbidity, and/or Mortality  Presenting problems: moderate  Diagnostic procedures: moderate  Management options: moderate    Patient Progress  Patient progress: stable      Final diagnoses:   Closed displaced comminuted fracture of shaft of left femur, initial encounter (CMS/[facility])          Alejandro Amaya Jr., MD  11/02/18 0023     177.9

## 2018-11-28 NOTE — ED PROVIDER NOTE - CARDIAC, MLM
Hpi Title: Evaluation of Skin Lesions How Severe Are Your Spot(S)?: mild Have Your Spot(S) Been Treated In The Past?: has not been treated Additional History: Last FBSCS was 11/16/17. Normal rate, regular rhythm.  Heart sounds S1, S2.  No murmurs, rubs or gallops.

## 2018-12-03 ENCOUNTER — APPOINTMENT (OUTPATIENT)
Dept: DERMATOLOGY | Facility: CLINIC | Age: 83
End: 2018-12-03
Payer: MEDICARE

## 2018-12-03 DIAGNOSIS — L30.9 DERMATITIS, UNSPECIFIED: ICD-10-CM

## 2018-12-03 PROCEDURE — 99213 OFFICE O/P EST LOW 20 MIN: CPT

## 2018-12-18 ENCOUNTER — APPOINTMENT (OUTPATIENT)
Dept: DERMATOLOGY | Facility: CLINIC | Age: 83
End: 2018-12-18

## 2018-12-18 ENCOUNTER — APPOINTMENT (OUTPATIENT)
Dept: DERMATOLOGY | Facility: CLINIC | Age: 83
End: 2018-12-18
Payer: MEDICARE

## 2018-12-18 DIAGNOSIS — Z41.1 ENCOUNTER FOR COSMETIC SURGERY: ICD-10-CM

## 2018-12-18 PROCEDURE — ZZZZZ: CPT

## 2019-02-08 ENCOUNTER — APPOINTMENT (OUTPATIENT)
Dept: ELECTROPHYSIOLOGY | Facility: CLINIC | Age: 84
End: 2019-02-08

## 2019-02-27 ENCOUNTER — APPOINTMENT (OUTPATIENT)
Dept: ORTHOPEDIC SURGERY | Facility: CLINIC | Age: 84
End: 2019-02-27
Payer: MEDICARE

## 2019-02-27 VITALS
DIASTOLIC BLOOD PRESSURE: 83 MMHG | WEIGHT: 180 LBS | HEIGHT: 69.5 IN | BODY MASS INDEX: 26.06 KG/M2 | TEMPERATURE: 97.4 F | HEART RATE: 56 BPM | SYSTOLIC BLOOD PRESSURE: 152 MMHG

## 2019-02-27 DIAGNOSIS — Z78.9 OTHER SPECIFIED HEALTH STATUS: ICD-10-CM

## 2019-02-27 DIAGNOSIS — Z60.2 PROBLEMS RELATED TO LIVING ALONE: ICD-10-CM

## 2019-02-27 PROCEDURE — 73030 X-RAY EXAM OF SHOULDER: CPT | Mod: LT

## 2019-02-27 PROCEDURE — 20610 DRAIN/INJ JOINT/BURSA W/O US: CPT | Mod: LT

## 2019-02-27 PROCEDURE — 99214 OFFICE O/P EST MOD 30 MIN: CPT | Mod: 25

## 2019-02-27 SDOH — SOCIAL STABILITY - SOCIAL INSECURITY: PROBLEMS RELATED TO LIVING ALONE: Z60.2

## 2019-03-06 PROBLEM — Z78.9 ALCOHOL USE: Status: ACTIVE | Noted: 2019-02-27

## 2019-03-12 NOTE — DISCUSSION/SUMMARY
[de-identified] : At this time, due to left shoulder impingement syndrome, the patient was advised to ice it and return to the office in 2 weeks.\par \par The patient has been advised that their blood pressure today was outside normal ranges and the patient was instructed accordingly. Our Blood Pressure Monitoring Sheet with instructions was given to the patient.\par \par

## 2019-03-12 NOTE — ADDENDUM
[FreeTextEntry1] : This note was dictated by Kajal López, OTR/L, PA\par  \par This note was written by Andi Tijerina on 02/22/2019, acting as a scribe for Zeke Ham III, MD

## 2019-03-12 NOTE — PROCEDURE
[de-identified] : Consent: \par At this time, I have recommended an injection to the left shoulder.  The risks and benefits of the procedure were discussed with the patient in detail.  Upon verbal consent of the patient, we proceeded with the injection as noted below.  \par \par Procedure:  \par After a sterile prep, the patient underwent an injection of 9 cc of 1% Lidocaine without epinephrine and 1 cc of Kenalog into the left shoulder.  The patient tolerated the procedure well.  There were no complications.  \par \par

## 2019-03-12 NOTE — HISTORY OF PRESENT ILLNESS
[Rest] : relieved by rest [2] : the ailment interference is 2/10 [1] : the ailment interference is 1/10 [5] : the ailment interference is 5/10 [3] : the ailment interference is 3/10 [de-identified] : The patient comes in today with complaints of left shoulder pain.  He states it is mostly with overhead activities.  The patient states the pain is localized.  The patient describes the pain as achy.   [de-identified] : stretching arm [] : No [de-identified] : Advil

## 2019-03-12 NOTE — PHYSICAL EXAM
[Normal] : Gait: normal [de-identified] : Left Shoulder:  \par Shoulder: Range of Motion in Degrees:	\par 	                                  Claimant:	Normal:	\par Abduction (Active)	                  180	               180 degrees	\par Abduction (Passive)	  180	               180 degrees	\par Forward elevation (Active):	  180	               180 degrees	\par Forward elevation (Passive):	  180	               180 degrees	\par External rotation (Active):	   45	               45 degrees	\par External rotation (Passive):	   45	               45 degrees	\par Internal rotation (Active):	   L-1	               L-1	\par Internal rotation (Passive):	   L-1	               L-1	\par  \par No motor weakness to internal rotation, external rotation or abduction in the scapular plane.  Negative crank test.  Negative O’Andrés’s test.  Negative Speed’s test. Negative Yergason’s test.  Negative cross arm test.  No tenderness to palpation at the AC joint. Positive Hawkin’s sign.  Positive Neer’s sign. Negative apprehension. Negative sulcus sign.  No gross neurological or vascular deficits distally.  Skin is intact.  No rashes, scars or lesions. 2+ radial and ulnar pulses. No extra-articular swelling or tenderness.\par \par Right Shoulder: \par Shoulder: Range of Motion in Degrees:   	\par    	                        Claimant:          Normal:  	\par Abduction (Active)  	180  	180 degrees  	\par Abduction (Passive)  	180  	180 degrees  	\par Forward elevation (Active):  	180  	180 degrees  	\par Forward elevation (Passive):  180  	180 degrees  	\par External rotation (Active):  	45  	45 degrees  	\par External rotation (Passive):  	45  	45 degrees  	\par Internal rotation (Active):  	L-1  	L-1  	\par Internal rotation (Passive):  	L-1  	L-1  	\par \par No motor weakness to internal rotation, external rotation or abduction in the scapular plane.  Negative crank test.  Negative O’Andrés’s test.  Negative Speed’s test. Negative Yergason’s test.  Negative cross arm test.  No tenderness to palpation at the AC joint. Negative Hawkin’s sign.  Negative Neer’s sign.  Negative apprehension. Negative sulcus sign.  No gross neurological or vascular deficits distally.  Skin is intact.  No rashes, scars or lesions. radial and ulnar pulses. No extra-articular swelling or tenderness.  [de-identified] : Appearance:  Well developed, well-nourished male in no acute distress.\par  [de-identified] : Radiographs, two views of the left shoulder, reveal no acute fractures or dislocations.

## 2019-03-13 ENCOUNTER — APPOINTMENT (OUTPATIENT)
Dept: ORTHOPEDIC SURGERY | Facility: CLINIC | Age: 84
End: 2019-03-13

## 2019-04-16 ENCOUNTER — APPOINTMENT (OUTPATIENT)
Dept: DERMATOLOGY | Facility: CLINIC | Age: 84
End: 2019-04-16
Payer: MEDICARE

## 2019-04-16 DIAGNOSIS — D18.00 HEMANGIOMA UNSPECIFIED SITE: ICD-10-CM

## 2019-04-16 PROCEDURE — 99213 OFFICE O/P EST LOW 20 MIN: CPT

## 2019-04-16 NOTE — ASSESSMENT
[FreeTextEntry1] : A complete skin examination was performed.  There is no evidence of skin cancer.  We discussed the importance of photoprotection, including the use of hats, protective clothing and sunscreens with an SPF of at least 30.  Sun avoidance was also discussed.\par \par Will give touch-up tx with PDL for the right > left malar region at patients convenience.

## 2019-04-16 NOTE — HISTORY OF PRESENT ILLNESS
[FreeTextEntry1] : Patient presents for skin examination. [de-identified] : Denies new, changing, bleeding or tender lesions on the skin over the past 6 months.\par

## 2019-04-16 NOTE — PHYSICAL EXAM
[Alert] : alert [Oriented x 3] : ~L oriented x 3 [Well Nourished] : well nourished [Full Body Skin Exam Performed] : performed [Eyelids] : Eyelids [Ears] : Ears [Lips] : Lips [Neck] : Neck [FreeTextEntry3] : A full skin exam was performed including the scalp, face, neck, chest, abdomen, back, buttocks, upper extremities and lower extremities.  The patient declined examination of the genitalia, and feet.\par The exam did not reveal any evidence of skin cancer, showing only the following benign growths:\par [place] pigmented nevi.\par Lentigines.\par Cherry angioma.\par Telangiectasias of the right > left malar region.\par

## 2019-05-01 ENCOUNTER — TRANSCRIPTION ENCOUNTER (OUTPATIENT)
Age: 84
End: 2019-05-01

## 2019-05-03 ENCOUNTER — APPOINTMENT (OUTPATIENT)
Dept: ELECTROPHYSIOLOGY | Facility: CLINIC | Age: 84
End: 2019-05-03
Payer: MEDICARE

## 2019-05-03 ENCOUNTER — NON-APPOINTMENT (OUTPATIENT)
Age: 84
End: 2019-05-03

## 2019-05-03 VITALS — DIASTOLIC BLOOD PRESSURE: 81 MMHG | SYSTOLIC BLOOD PRESSURE: 132 MMHG

## 2019-05-03 VITALS — SYSTOLIC BLOOD PRESSURE: 156 MMHG | DIASTOLIC BLOOD PRESSURE: 94 MMHG

## 2019-05-03 VITALS — HEIGHT: 69.5 IN | WEIGHT: 175 LBS | HEART RATE: 64 BPM | BODY MASS INDEX: 25.34 KG/M2

## 2019-05-03 DIAGNOSIS — I49.1 ATRIAL PREMATURE DEPOLARIZATION: ICD-10-CM

## 2019-05-03 PROCEDURE — 99214 OFFICE O/P EST MOD 30 MIN: CPT

## 2019-05-03 PROCEDURE — 93000 ELECTROCARDIOGRAM COMPLETE: CPT

## 2019-05-03 NOTE — REVIEW OF SYSTEMS
[see HPI] : see HPI [Joint Pain] : joint pain [Muscle Cramps] : muscle cramps [Limb Weakness (Paresis)] : limb weakness [Numbness (Hypesthesia)] : numbness [Tingling (Paresthesia)] : tingling [Negative] : Heme/Lymph

## 2019-05-03 NOTE — ASSESSMENT
[FreeTextEntry1] : 86-year-old gentleman, status post coronary artery bypass surgery with a history of PVCs and recently PACs. He is currently asymptomatic from a cardiac perspective. .He also has a chronic right bundle branch block. An EKG today is unchanged. \par Would recommend continued current medical therapy.

## 2019-05-03 NOTE — HISTORY OF PRESENT ILLNESS
[FreeTextEntry1] : Mr. Miller is an 86-year-old gentleman with history of coronary artery disease status post CABG, history of PVCs, who presents for followup.\par \par He reports he is feeling well. Only complaint is some back pain s/p his surgery. \par \par MASHA MILLER  denies chest pain, chest pressure, shortness of breath, lightheadedness, dizziness, palpitations, syncope, presyncope, orthopnea, PND, or edema.

## 2019-05-03 NOTE — PHYSICAL EXAM
[General Appearance - Well Developed] : well developed [Normal Appearance] : normal appearance [Well Groomed] : well groomed [General Appearance - In No Acute Distress] : no acute distress [General Appearance - Well Nourished] : well nourished [No Deformities] : no deformities [Eyelids - No Xanthelasma] : the eyelids demonstrated no xanthelasmas [Normal Conjunctiva] : the conjunctiva exhibited no abnormalities [No Oral Cyanosis] : no oral cyanosis [Normal Oral Mucosa] : normal oral mucosa [No Oral Pallor] : no oral pallor [Normal Jugular Venous A Waves Present] : normal jugular venous A waves present [Normal Jugular Venous V Waves Present] : normal jugular venous V waves present [Respiration, Rhythm And Depth] : normal respiratory rhythm and effort [No Jugular Venous English A Waves] : no jugular venous english A waves [Auscultation Breath Sounds / Voice Sounds] : lungs were clear to auscultation bilaterally [Exaggerated Use Of Accessory Muscles For Inspiration] : no accessory muscle use [Heart Rate And Rhythm] : heart rate and rhythm were normal [Heart Sounds] : normal S1 and S2 [Abdomen Soft] : soft [Murmurs] : no murmurs present [Abdomen Tenderness] : non-tender [Abdomen Mass (___ Cm)] : no abdominal mass palpated [Abnormal Walk] : normal gait [Gait - Sufficient For Exercise Testing] : the gait was sufficient for exercise testing [Nail Clubbing] : no clubbing of the fingernails [Cyanosis, Localized] : no localized cyanosis [Petechial Hemorrhages (___cm)] : no petechial hemorrhages [Skin Color & Pigmentation] : normal skin color and pigmentation [] : no ischemic changes [No Xanthoma] : no  xanthoma was observed [Skin Lesions] : no skin lesions [No Skin Ulcers] : no skin ulcer [No Venous Stasis] : no venous stasis [Mood] : the mood was normal [Affect] : the affect was normal [Oriented To Time, Place, And Person] : oriented to person, place, and time [No Anxiety] : not feeling anxious

## 2019-05-07 ENCOUNTER — APPOINTMENT (OUTPATIENT)
Dept: DERMATOLOGY | Facility: CLINIC | Age: 84
End: 2019-05-07
Payer: SELF-PAY

## 2019-05-07 VITALS — HEIGHT: 69.5 IN | WEIGHT: 175 LBS | BODY MASS INDEX: 25.34 KG/M2

## 2019-05-07 DIAGNOSIS — I78.1 NEVUS, NON-NEOPLASTIC: ICD-10-CM

## 2019-05-07 PROCEDURE — ZZZZZ: CPT

## 2019-06-24 ENCOUNTER — APPOINTMENT (OUTPATIENT)
Dept: UROLOGY | Facility: CLINIC | Age: 84
End: 2019-06-24
Payer: MEDICARE

## 2019-06-24 VITALS
RESPIRATION RATE: 16 BRPM | DIASTOLIC BLOOD PRESSURE: 88 MMHG | SYSTOLIC BLOOD PRESSURE: 158 MMHG | HEIGHT: 69 IN | TEMPERATURE: 98.1 F | HEART RATE: 67 BPM | BODY MASS INDEX: 25.92 KG/M2 | WEIGHT: 175 LBS | OXYGEN SATURATION: 99 %

## 2019-06-24 PROCEDURE — 99213 OFFICE O/P EST LOW 20 MIN: CPT | Mod: 25

## 2019-06-24 PROCEDURE — 51741 ELECTRO-UROFLOWMETRY FIRST: CPT

## 2019-06-24 NOTE — HISTORY OF PRESENT ILLNESS
[FreeTextEntry1] : This patient presents for a regular checkup. His PSA is 5.13 which is higher than it was in the past and he has noted some lower tract symptoms which include daytime frequency and some urgency. A flow study done today shows a decreased flow curve.

## 2019-06-24 NOTE — PHYSICAL EXAM
[General Appearance - Well Developed] : well developed [General Appearance - Well Nourished] : well nourished [Normal Appearance] : normal appearance [Well Groomed] : well groomed [General Appearance - In No Acute Distress] : no acute distress [Abdomen Soft] : soft [Abdomen Tenderness] : non-tender [Costovertebral Angle Tenderness] : no ~M costovertebral angle tenderness [Urethral Meatus] : meatus normal [Urinary Bladder Findings] : the bladder was normal on palpation [Scrotum] : the scrotum was normal [Testes Mass (___cm)] : there were no testicular masses [No Prostate Nodules] : no prostate nodules [FreeTextEntry1] : Prostate small and palpably benign with mild assyemtry R>L [Edema] : no peripheral edema [] : no respiratory distress [Respiration, Rhythm And Depth] : normal respiratory rhythm and effort [Exaggerated Use Of Accessory Muscles For Inspiration] : no accessory muscle use [Oriented To Time, Place, And Person] : oriented to person, place, and time [Affect] : the affect was normal [Mood] : the mood was normal [Not Anxious] : not anxious [Normal Station and Gait] : the gait and station were normal for the patient's age [No Palpable Adenopathy] : no palpable adenopathy [No Focal Deficits] : no focal deficits

## 2019-06-24 NOTE — END OF VISIT
[FreeTextEntry3] : He will take a course of antibiotics with a repeat PSA. He has Flomax but has not been taking it and he will start. He will follow the transrectal ultrasound of the prostate in one

## 2019-07-04 NOTE — ED PROVIDER NOTE - NS ED ATTENDING STATEMENT MOD
Ht.     63 "        Wt.  129      kg               BMI   50               IBW    52   kg               Pt is at  248  %  IBW
Attending Only

## 2019-07-15 ENCOUNTER — MEDICATION RENEWAL (OUTPATIENT)
Age: 84
End: 2019-07-15

## 2019-07-15 ENCOUNTER — RX RENEWAL (OUTPATIENT)
Age: 84
End: 2019-07-15

## 2019-07-24 ENCOUNTER — APPOINTMENT (OUTPATIENT)
Dept: UROLOGY | Facility: CLINIC | Age: 84
End: 2019-07-24
Payer: MEDICARE

## 2019-07-24 VITALS
HEART RATE: 77 BPM | DIASTOLIC BLOOD PRESSURE: 94 MMHG | SYSTOLIC BLOOD PRESSURE: 164 MMHG | HEIGHT: 69 IN | WEIGHT: 180 LBS | BODY MASS INDEX: 26.66 KG/M2 | OXYGEN SATURATION: 98 %

## 2019-07-24 DIAGNOSIS — R97.20 ELEVATED PROSTATE, SPECIFIC ANTIGEN [PSA]: ICD-10-CM

## 2019-07-24 DIAGNOSIS — R35.0 FREQUENCY OF MICTURITION: ICD-10-CM

## 2019-07-24 PROCEDURE — 76857 US EXAM PELVIC LIMITED: CPT

## 2019-07-24 PROCEDURE — 76872 US TRANSRECTAL: CPT

## 2019-07-24 PROCEDURE — 51741 ELECTRO-UROFLOWMETRY FIRST: CPT

## 2019-09-26 ENCOUNTER — APPOINTMENT (OUTPATIENT)
Dept: NEUROSURGERY | Facility: CLINIC | Age: 84
End: 2019-09-26
Payer: MEDICARE

## 2019-09-26 VITALS
DIASTOLIC BLOOD PRESSURE: 84 MMHG | BODY MASS INDEX: 25.92 KG/M2 | HEIGHT: 69 IN | TEMPERATURE: 98.2 F | OXYGEN SATURATION: 97 % | WEIGHT: 175 LBS | SYSTOLIC BLOOD PRESSURE: 156 MMHG | HEART RATE: 77 BPM

## 2019-09-26 DIAGNOSIS — M54.16 RADICULOPATHY, LUMBAR REGION: ICD-10-CM

## 2019-09-26 PROCEDURE — 99214 OFFICE O/P EST MOD 30 MIN: CPT

## 2019-09-26 NOTE — CONSULT LETTER
[Dear  ___] : Dear  [unfilled], [Courtesy Letter:] : I had the pleasure of seeing your patient, [unfilled], in my office today. [Sincerely,] : Sincerely, [FreeTextEntry2] : Rubén Humphrey MD\par [address] \[place] [address] [FreeTextEntry1] : Dr. Miller very pleasant 87-year-old male patient who was seen in our office today in regards to severe left-sided leg pain. The patient previously underwent an L2 to L4 decompression via a minimally invasive approach.\par \par The patient endorses a 2 month history of severe left-sided leg pain radiating from his lower back to approximately the knee. The patient does not recall any inciting event. The pain is constant and unremitting. The patient states that the pain alternatively feels like a dull ache as well as occasional electrical shocks. The patient does not endorse any new weakness in the lower extremities.\par \par On examination, patient is alert, oriented, and compliant with the exam. His surgical incision is clean, dry, and intact. The patient currently requires the use of a walker for stability secondary to pain. 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 This MRI scan demonstrates a good central decompression on the back drop of degenerative changes. I believe that there is an L3/4 far lateral disc herniation on the left, but this is not reported on the radiology report.\par \par Taken together, the patient is a clinical history of a lumbar radiculopathy. The patient has radiographic findings possibly consistent with far lateral disc herniation, but her office will contact the radiologist for second look to confirm. In the interim, I provided a prescription for gabapentin as oxycodone and steroid medications do not seem to be working for a gentleman. I've encouraged him to continue conservative management therapies including injection therapy. I will be in contact with the patient once we confirm the presence of a far lateral disc herniation. [FreeTextEntry3] : Carter Rowell MD, PhD, FRCPSC \par Attending Neurosurgeon \par [facility] \par [address], [address] floor \[place] [address] \par Office: [phone] \par Fax: [phone]\par \par

## 2019-09-29 ENCOUNTER — INPATIENT (INPATIENT)
Facility: HOSPITAL | Age: 84
LOS: 1 days | Discharge: HOME | End: 2019-10-01
Attending: STUDENT IN AN ORGANIZED HEALTH CARE EDUCATION/TRAINING PROGRAM | Admitting: STUDENT IN AN ORGANIZED HEALTH CARE EDUCATION/TRAINING PROGRAM

## 2019-09-29 VITALS — HEIGHT: 69 IN | WEIGHT: 175.05 LBS

## 2019-09-29 DIAGNOSIS — Z98.49 CATARACT EXTRACTION STATUS, UNSPECIFIED EYE: Chronic | ICD-10-CM

## 2019-09-29 DIAGNOSIS — Z41.9 ENCOUNTER FOR PROCEDURE FOR PURPOSES OTHER THAN REMEDYING HEALTH STATE, UNSPECIFIED: Chronic | ICD-10-CM

## 2019-09-29 DIAGNOSIS — Z95.1 PRESENCE OF AORTOCORONARY BYPASS GRAFT: Chronic | ICD-10-CM

## 2019-09-29 DIAGNOSIS — Z90.49 ACQUIRED ABSENCE OF OTHER SPECIFIED PARTS OF DIGESTIVE TRACT: Chronic | ICD-10-CM

## 2019-09-29 LAB
ALBUMIN SERPL ELPH-MCNC: 3.7 G/DL — SIGNIFICANT CHANGE UP (ref 3.3–5)
ALP SERPL-CCNC: 75 U/L — SIGNIFICANT CHANGE UP (ref 40–120)
ALT FLD-CCNC: 28 U/L — SIGNIFICANT CHANGE UP (ref 12–78)
ANION GAP SERPL CALC-SCNC: 6 MMOL/L — SIGNIFICANT CHANGE UP (ref 5–17)
APPEARANCE UR: CLEAR — SIGNIFICANT CHANGE UP
APTT BLD: 32.6 SEC — SIGNIFICANT CHANGE UP (ref 27.5–36.3)
AST SERPL-CCNC: 20 U/L — SIGNIFICANT CHANGE UP (ref 15–37)
BILIRUB SERPL-MCNC: 0.7 MG/DL — SIGNIFICANT CHANGE UP (ref 0.2–1.2)
BILIRUB UR-MCNC: NEGATIVE — SIGNIFICANT CHANGE UP
BUN SERPL-MCNC: 20 MG/DL — SIGNIFICANT CHANGE UP (ref 7–23)
CALCIUM SERPL-MCNC: 9 MG/DL — SIGNIFICANT CHANGE UP (ref 8.5–10.1)
CHLORIDE SERPL-SCNC: 104 MMOL/L — SIGNIFICANT CHANGE UP (ref 96–108)
CO2 SERPL-SCNC: 29 MMOL/L — SIGNIFICANT CHANGE UP (ref 22–31)
COLOR SPEC: YELLOW — SIGNIFICANT CHANGE UP
CREAT SERPL-MCNC: 1.13 MG/DL — SIGNIFICANT CHANGE UP (ref 0.5–1.3)
DIFF PNL FLD: NEGATIVE — SIGNIFICANT CHANGE UP
GLUCOSE SERPL-MCNC: 158 MG/DL — HIGH (ref 70–99)
GLUCOSE UR QL: NEGATIVE MG/DL — SIGNIFICANT CHANGE UP
HCT VFR BLD CALC: 45.4 % — SIGNIFICANT CHANGE UP (ref 39–50)
HGB BLD-MCNC: 15.2 G/DL — SIGNIFICANT CHANGE UP (ref 13–17)
INR BLD: 1.02 RATIO — SIGNIFICANT CHANGE UP (ref 0.88–1.16)
KETONES UR-MCNC: ABNORMAL
LEUKOCYTE ESTERASE UR-ACNC: NEGATIVE — SIGNIFICANT CHANGE UP
MAGNESIUM SERPL-MCNC: 2.1 MG/DL — SIGNIFICANT CHANGE UP (ref 1.6–2.6)
MCHC RBC-ENTMCNC: 31.2 PG — SIGNIFICANT CHANGE UP (ref 27–34)
MCHC RBC-ENTMCNC: 33.5 GM/DL — SIGNIFICANT CHANGE UP (ref 32–36)
MCV RBC AUTO: 93.2 FL — SIGNIFICANT CHANGE UP (ref 80–100)
NITRITE UR-MCNC: NEGATIVE — SIGNIFICANT CHANGE UP
PH UR: 6.5 — SIGNIFICANT CHANGE UP (ref 5–8)
PLATELET # BLD AUTO: 208 K/UL — SIGNIFICANT CHANGE UP (ref 150–400)
POTASSIUM SERPL-MCNC: 4 MMOL/L — SIGNIFICANT CHANGE UP (ref 3.5–5.3)
POTASSIUM SERPL-SCNC: 4 MMOL/L — SIGNIFICANT CHANGE UP (ref 3.5–5.3)
PROT SERPL-MCNC: 7 GM/DL — SIGNIFICANT CHANGE UP (ref 6–8.3)
PROT UR-MCNC: 30 MG/DL
PROTHROM AB SERPL-ACNC: 11.3 SEC — SIGNIFICANT CHANGE UP (ref 10–12.9)
RBC # BLD: 4.87 M/UL — SIGNIFICANT CHANGE UP (ref 4.2–5.8)
RBC # FLD: 13.8 % — SIGNIFICANT CHANGE UP (ref 10.3–14.5)
SODIUM SERPL-SCNC: 139 MMOL/L — SIGNIFICANT CHANGE UP (ref 135–145)
SP GR SPEC: 1.01 — SIGNIFICANT CHANGE UP (ref 1.01–1.02)
TROPONIN I SERPL-MCNC: <0.015 NG/ML — SIGNIFICANT CHANGE UP (ref 0.01–0.04)
TROPONIN I SERPL-MCNC: <0.015 NG/ML — SIGNIFICANT CHANGE UP (ref 0.01–0.04)
UROBILINOGEN FLD QL: NEGATIVE MG/DL — SIGNIFICANT CHANGE UP
WBC # BLD: 7.58 K/UL — SIGNIFICANT CHANGE UP (ref 3.8–10.5)
WBC # FLD AUTO: 7.58 K/UL — SIGNIFICANT CHANGE UP (ref 3.8–10.5)

## 2019-09-29 RX ORDER — INFLUENZA VIRUS VACCINE 15; 15; 15; 15 UG/.5ML; UG/.5ML; UG/.5ML; UG/.5ML
0.5 SUSPENSION INTRAMUSCULAR ONCE
Refills: 0 | Status: COMPLETED | OUTPATIENT
Start: 2019-09-29 | End: 2019-09-29

## 2019-09-29 RX ORDER — LOSARTAN POTASSIUM 100 MG/1
50 TABLET, FILM COATED ORAL DAILY
Refills: 0 | Status: DISCONTINUED | OUTPATIENT
Start: 2019-09-29 | End: 2019-10-01

## 2019-09-29 RX ORDER — OXYCODONE HYDROCHLORIDE 5 MG/1
5 TABLET ORAL EVERY 6 HOURS
Refills: 0 | Status: DISCONTINUED | OUTPATIENT
Start: 2019-09-29 | End: 2019-10-01

## 2019-09-29 RX ORDER — ATORVASTATIN CALCIUM 80 MG/1
20 TABLET, FILM COATED ORAL AT BEDTIME
Refills: 0 | Status: DISCONTINUED | OUTPATIENT
Start: 2019-09-29 | End: 2019-10-01

## 2019-09-29 RX ORDER — LANOLIN ALCOHOL/MO/W.PET/CERES
5 CREAM (GRAM) TOPICAL ONCE
Refills: 0 | Status: COMPLETED | OUTPATIENT
Start: 2019-09-29 | End: 2019-09-29

## 2019-09-29 RX ORDER — PANTOPRAZOLE SODIUM 20 MG/1
40 TABLET, DELAYED RELEASE ORAL
Refills: 0 | Status: DISCONTINUED | OUTPATIENT
Start: 2019-09-29 | End: 2019-10-01

## 2019-09-29 RX ORDER — CARBIDOPA AND LEVODOPA 25; 100 MG/1; MG/1
1 TABLET ORAL
Refills: 0 | Status: DISCONTINUED | OUTPATIENT
Start: 2019-09-29 | End: 2019-09-30

## 2019-09-29 RX ORDER — ACETAMINOPHEN 500 MG
1000 TABLET ORAL ONCE
Refills: 0 | Status: COMPLETED | OUTPATIENT
Start: 2019-09-29 | End: 2019-09-29

## 2019-09-29 RX ORDER — GABAPENTIN 400 MG/1
100 CAPSULE ORAL THREE TIMES A DAY
Refills: 0 | Status: DISCONTINUED | OUTPATIENT
Start: 2019-09-29 | End: 2019-10-01

## 2019-09-29 RX ORDER — ASPIRIN/CALCIUM CARB/MAGNESIUM 324 MG
81 TABLET ORAL DAILY
Refills: 0 | Status: DISCONTINUED | OUTPATIENT
Start: 2019-09-29 | End: 2019-10-01

## 2019-09-29 RX ADMIN — Medication 400 MILLIGRAM(S): at 19:47

## 2019-09-29 RX ADMIN — CARBIDOPA AND LEVODOPA 1 TABLET(S): 25; 100 TABLET ORAL at 20:33

## 2019-09-29 RX ADMIN — GABAPENTIN 100 MILLIGRAM(S): 400 CAPSULE ORAL at 20:33

## 2019-09-29 RX ADMIN — Medication 5 MILLIGRAM(S): at 23:57

## 2019-09-29 RX ADMIN — Medication 1000 MILLIGRAM(S): at 20:30

## 2019-09-29 NOTE — ED ADULT TRIAGE NOTE - CHIEF COMPLAINT QUOTE
Pt brought in by family for evaluation of period of unresponsiveness that lasted approximately one minute at home where he was unable to speak and was grabbing with his hands, resolved upon EMS arrival and pt refused to go to ED. Family brought patient to ED after convincing him to come. Pt had similar episode 5 weeks ago lasting 45 seconds per family. Dr Perez to triage to evaluate patient for code stroke and no code stroke initiated. Pt to main ED for further evaluation.

## 2019-09-29 NOTE — H&P ADULT - NSHPPHYSICALEXAM_GEN_ALL_CORE
Objective:  Vitals  T(C): 36.9 (09-29-19 @ 10:12), Max: 36.9 (09-29-19 @ 10:12)  HR: 83 (09-29-19 @ 10:12) (83 - 83)  BP: 134/75 (09-29-19 @ 10:12) (134/75 - 134/75)  RR: 18 (09-29-19 @ 10:12) (18 - 18)  SpO2: 100% (09-29-19 @ 10:12) (100% - 100%)    Physical Exam:  General: comfortable, no acute distress, well nourished  HEENT: no LAD, trachea midline  Cardiovascular: normal s1s2, no mrg  Pulmonary: CTA Bilaterally, no wheezing , rhonchi  Gastrointestinal: soft non tender non distended, no masses felt  Muscloskeletal: no lower extremity edema  Neurological: CN II-12 intact. No focal weakness  Psychiatrical: normal mood, cooperative Objective:  Vitals  T(C): 36.9 (09-29-19 @ 10:12), Max: 36.9 (09-29-19 @ 10:12)  HR: 83 (09-29-19 @ 10:12) (83 - 83)  BP: 134/75 (09-29-19 @ 10:12) (134/75 - 134/75)  RR: 18 (09-29-19 @ 10:12) (18 - 18)  SpO2: 100% (09-29-19 @ 10:12) (100% - 100%)    Physical Exam:  General: comfortable, no acute distress, well nourished  HEENT: no LAD, trachea midline  Cardiovascular: normal s1s2, no mrg  Pulmonary: CTA Bilaterally, no wheezing , rhonchi  Gastrointestinal: soft non tender non distended, no masses felt  Muscloskeletal: no lower extremity edema  Neurological: CN II-12 intact. No focal weakness. bilateral resting tremors  Psychiatrical: normal mood, cooperative

## 2019-09-29 NOTE — H&P ADULT - NSHPLABSRESULTS_GEN_ALL_CORE
Labs:                          15.2   7.58  )-----------( 208      ( 29 Sep 2019 10:28 )             45.4     09-29    139  |  104  |  20  ----------------------------<  158<H>  4.0   |  29  |  1.13    Ca    9.0      29 Sep 2019 10:28  Mg     2.1     09-29    TPro  7.0  /  Alb  3.7  /  TBili  0.7  /  DBili  x   /  AST  20  /  ALT  28  /  AlkPhos  75  09-29    LIVER FUNCTIONS - ( 29 Sep 2019 10:28 )  Alb: 3.7 g/dL / Pro: 7.0 gm/dL / ALK PHOS: 75 U/L / ALT: 28 U/L / AST: 20 U/L / GGT: x           PT/INR - ( 29 Sep 2019 10:28 )   PT: 11.3 sec;   INR: 1.02 ratio         PTT - ( 29 Sep 2019 10:28 )  PTT:32.6 sec      Active Medications  MEDICATIONS  (STANDING):  atorvastatin 20 milliGRAM(s) Oral at bedtime  losartan 50 milliGRAM(s) Oral daily  pantoprazole    Tablet 40 milliGRAM(s) Oral before breakfast    MEDICATIONS  (PRN):

## 2019-09-29 NOTE — ED PROVIDER NOTE - CLINICAL SUMMARY MEDICAL DECISION MAKING FREE TEXT BOX
86 y/o male with multiple cardiac PMHx including CAD s/p stent, RBBB on EKG, CABG x3 presents to the ED for episode of unresponsiveness this AM. Plan: labs including CBC, CMP, mag, PT INR, troponin, UA, CT head, CXR, EKG.

## 2019-09-29 NOTE — H&P ADULT - NSHPOUTPATIENTPROVIDERS_GEN_ALL_CORE
DR: cristo PCP   Dr. Harper: neuro  Dr. birmingham: EP DR: cristo PCP   Dr. Harper: neuro  Dr. birmingham: EP  DR. gonsales.. Cardio

## 2019-09-29 NOTE — H&P ADULT - HISTORY OF PRESENT ILLNESS
88 y/o male with PMHx of anxiety, back pain, BPH, CAD s/p stent, RBBB, left foot drop, HLD, HTN, lumbar radiculopathy, lumbar stenosis, melanoma, s/p cholecystectomy, s/p CABG x3, s/p discectomy for herniated nucleus pulposus presents to  for evaulation of loss of consciosuness.   HIstory gathered by patients family: Episode started early today.pt was sitting at the table eating breakfast and talking, when he started to talk about something completely different. When asked what he was talking about, pt grew silent, stood up, sat back down, and looked pale in appearance. Episode last about 1 minute. EMS was called and on arrival patient regained consciouness.   Noted by family , patient has a similar episode 5 weeks ago lasting 45 seconds per family. Neuro: Marlo EP: Amalia.  Er course:Patient vitals stable Afebrile. Not tachy, trop negative x 1. EKG: on my read: sinus tachy at 104 with PVC. CT head negative  Labs : CBC and BMP and U/A insignificant 88 y/o male with PMHx of anxiety, back pain, BPH, CAD s/p stent, RBBB, left foot drop, HLD, HTN, lumbar radiculopathy, lumbar stenosis, melanoma, s/p cholecystectomy, s/p CABG x3, s/p discectomy for herniated nucleus pulposus presents to  for evaulation of loss of consciosuness.   HIstory gathered by patients family: Episode started early today.pt was sitting at the table eating breakfast and talking, when he started to talk about something completely different. When asked what he was talking about, pt grew silent, stood up, sat back down, and looked pale in appearance. Episode last about 1 minute. EMS was called and on arrival patient regained consciouness.   Noted by family , patient has a similar episode 5 weeks ago lasting 45 seconds per family. Neuro: Marlo  In speaking with patient , patient reports this am he was doing well. remembers the entire day. remembers being in the kitchen.  patient reprots he was thinking about his. "Sciatica" and was not paying attention to his family.  He reports she does not remember the event, does not remember losing time. no prodromal symptoms.  He has a cardiologist Dr. Noble. His neurologist is Dr. Rutherford. he does report having an MRI of  his spine  phoebe disk herniation was found. he has not had an mri of his head. His neurologist did diagnsoe  Patients caregiver at bedside. She reports 5 family members were in kitchen that witnessed the event.  she reports the event as " 1 min of staring" , however she reports no LOC  Er course:Patient vitals stable Afebrile. Not tachy, trop negative x 1. EKG: on my read: sinus tachy at 104 with PVC. CT head negative  Labs : CBC and BMP and U/A insignificant 88 y/o male with PMHx of anxiety, back pain, BPH, CAD s/p stent, RBBB, left foot drop, HLD, HTN, lumbar radiculopathy, lumbar stenosis, melanoma, s/p cholecystectomy, s/p CABG x3, s/p discectomy for herniated nucleus pulposus presents to  for evaulation of loss of consciosuness.   HIstory gathered by patients family: Episode started early today.pt was sitting at the table eating breakfast and talking, when he started to mumble, subsequently pt grew silent, and had a vacant stare and looked pale. pisode last about 1 minute. EMS was called and on arrival patient regained consciouness. police officers gave him oxygen. patient refused to be transported to [facility]. therefore family brought patient in.  Noted by family , patient has a similar episode 1montn lasting 45 seconds per family.  started to produce nonsensiical speech.  In speaking with patient , patient reports this am he was doing well. remembers the entire day. remembers being in the kitchen.  patient reprots he was thinking about his. "Sciatica" and was not paying attention to his family.  He reports she does not remember the event, does not remember losing time. no prodromal symptoms.  He has a cardiologist Dr. Noble. His neurologist is Dr. Rutherford. he does report having an MRI of  his spine  phoebe disk herniation was found. he has not had an mri of his head. His neurologist did diagnsoe  Patients caregiver at bedside. She reports 5 family members were in kitchen that witnessed the event.  she reports the event as " 1 min of staring" , however she reports no LOC  Er course:Patient vitals stable Afebrile. Not tachy, trop negative x 1. EKG: on my read: sinus tachy at 104 with PVC. CT head negative  Labs : CBC and BMP and U/A insignificant

## 2019-09-29 NOTE — H&P ADULT - NSICDXPASTMEDICALHX_GEN_ALL_CORE_FT
PAST MEDICAL HISTORY:  Anxiety     Back pain     BPH (benign prostatic hyperplasia)     CAD (coronary artery disease) of artery bypass graft     Foot drop, left     GERD (gastroesophageal reflux disease)     H/O hyperlipidemia     HTN - Hypertension     Lumbar radiculopathy     Lumbar stenosis     Melanoma     Stented coronary artery 2004    Tremors of nervous system

## 2019-09-29 NOTE — H&P ADULT - ATTENDING COMMENTS
Spoke to  patients daughter has voiced the following concerns    1. patients med recs.. patietns home providers are in discord with med rec.   2. patients daughter concerned about medication side effects

## 2019-09-29 NOTE — H&P ADULT - ASSESSMENT
Syncope ddx vasovagal, cardiogenic neurogenic  Unclear etiology  Ct head negative  Trops negative  non toxic  -tele  -cards:: maccaro (cx placed)  -echo  -trend trops  -neuro consult  -EEG Syncope ddx vasovagal, cardiogenic neurogenic  Unclear etiology  Ct head negative  Trops negative  non toxic  -tele  -cards:: ilda (cx placed)  -echo  -trend trops  -neuro consult  -EEG    Back pain  c/w pain management    Parkinsonianism  c/w home dose of sinemet.. twice  a day    s/p CABG  c/w baby asa    GERD  continue with pepcid and protonix  based on hie: patient on rantidine and omeprazole    dvt ppx: lovenox 88 y/o male with PMHx of anxiety, back pain, BPH, CAD s/p stent, RBBB, left foot drop, HLD, HTN, lumbar radiculopathy, lumbar stenosis, melanoma, s/p cholecystectomy, s/p CABG x3, s/p discectomy for herniated nucleus pulposus presents to  for slurred speech    Blank stares , transient slurred speech , unresponsiveness r/o  TIA /seizure vs  underlying cardiogenic event    Unclear etiology  Ct head negative  Trops negative  non toxic  -tele  -cards:: ilda (cx placed)  -echo  -trend trops  -neuro consult  -EEG  *daughter stated patient visited neuro after first incident.. had carotid duplex   *daughter stated as percardio if patient has recurrent episodes may need PPM.    Back pain  c/w pain management    Parkinsonianism  c/w home dose of sinemet.. twice  a day    s/p CABG  c/w baby asa    GERD  continue with pepcid and protonix  based on hie: patient on rantidine and omeprazole    dvt ppx: lovenox 86 y/o male with PMHx of anxiety, back pain, BPH, CAD s/p stent, RBBB, left foot drop, HLD, HTN, lumbar radiculopathy, lumbar stenosis, melanoma, s/p cholecystectomy, s/p CABG x3, s/p discectomy for herniated nucleus pulposus presents to  for slurred speech    Blank stares , transient slurred speech , unresponsiveness r/o  TIA /seizure vs  underlying cardiogenic event    Unclear etiology  EKG reviewed by me: ;RBBB. looks  similar to previous  Ct head negative  Trops negative  non toxic  -tele  -echo  -trend trops  -EEG  -MRI Brain  *daughter stated patient visited neuro after first incident.. had carotid duplex   *daughter stated "as per cardio if patient has recurrent episodes may need PPM".  AT this point, I have consulted cards:: ilda (cx placed) and neuro (Grzegorz)      Back pain  c/w pain management    Parkinsonianism  c/w home dose of sinemet.. twice  a day    s/p CABG  c/w baby asa    GERD  continue with pepcid and protonix  based on hie: patient on rantidine and omeprazole    dvt ppx: lovenox

## 2019-09-29 NOTE — H&P ADULT - NSICDXPASTSURGICALHX_GEN_ALL_CORE_FT
PAST SURGICAL HISTORY:  Elective surgery excision of melanoma    History of cataract surgery IOL    History of cholecystectomy 2015    S/P CABG x 3 2013    S/P colonoscopy     S/P discectomy for herniated nucleus pulposus 2000, 2002 with left dropped foot

## 2019-09-29 NOTE — ED ADULT NURSE NOTE - CHPI ED NUR SYMPTOMS NEG
no decreased eating/drinking/no dizziness/no chills/no pain/no nausea/no vomiting/no fever/no tingling

## 2019-09-29 NOTE — H&P ADULT - NSHPREVIEWOFSYSTEMS_GEN_ALL_CORE
Review of Systems:  General:denies fever chills, headache, weakness  HEENT: denies blurry vision,diffculty swallowing,  Cardiovascular: denies chest pain  ,palpitatins  Pulmonary:denies shortness of breath, cough, wheezing  Gastrointestinal: denies abdominal pain, constipation, diarrhra  : denies hematuria, dysuria  Neurological: denies weakness, numbness , tingling, dizziness  skin: denies skin rash  Psychiatrical: denies mood disturbances Review of Systems:  General: denies fever chills, headache, weakness  HEENT: denies blurry vision,difficulty swallowing,  Cardiovascular: denies chest pain  ,palpitatoins  Pulmonary: denies shortness of breath, cough, wheezing  Gastrointestinal: denies abdominal pain, constipation, diarrhea  : denies hematuria, dysuria  Neurological: denies weakness, numbness , tingling, dizziness  skin: denies skin rash  Psychiatrical: denies mood disturbances

## 2019-09-29 NOTE — ED PROVIDER NOTE - OBJECTIVE STATEMENT
86 y/o male with PMHx of anxiety, back pain, BPH, CAD s/p stent, RBBB on EKG, left foot drop, HLD, HTN, lumbar radiculopathy, lumbar stenosis, melanoma, s/p cholecystectomy, s/p CABG x3, s/p discectomy for herniated nucleus pulposus presents to the ED urged by family for evaluation or episodes of unresponsiveness. Pt had one period of unresponsiveness today that lasted approximately one minute at home. Per daughter at bedside, pt was sitting at the table eating breakfast and talking, when he started to talk about something completely different. When asked what he was talking about, pt grew silent, stood up, sat back down, and looked pale in appearance. Episode resolved upon EMS arrival and pt refused to go to ED but family urged him to go. Pt had similar episode 5 weeks ago lasting 45 seconds per family. Recently on Cipro. Recently started on Neurontin 100mg TID on 9/26/19. Neuro: Rowell. Pain management: So. EP: Amalia. 86 y/o male with PMHx of anxiety, back pain, BPH, CAD s/p stent, RBBB on EKG, left foot drop, HLD, HTN, lumbar radiculopathy, lumbar stenosis, melanoma, s/p cholecystectomy, s/p CABG x3, s/p discectomy for herniated nucleus pulposus presents to the ED urged by family for evaluation or episodes of unresponsiveness. Pt had one period of unresponsiveness today that lasted approximately one minute at home. Per daughter at bedside, pt was sitting at the table eating breakfast and talking, when he started to talk about something completely different. When asked what he was talking about, pt grew silent, stood up, sat back down, and looked pale in appearance. Episode resolved upon EMS arrival and pt refused to go to ED but family urged him to go. Pt had similar episode 5 weeks ago lasting 45 seconds per family. Recently on Cipro. Recently started on Neurontin 100mg TID on 9/26/19. Neuro: Marlo. Pain management: So. EP: Amalia.

## 2019-09-30 DIAGNOSIS — R55 SYNCOPE AND COLLAPSE: ICD-10-CM

## 2019-09-30 LAB
ANION GAP SERPL CALC-SCNC: 6 MMOL/L — SIGNIFICANT CHANGE UP (ref 5–17)
BASOPHILS # BLD AUTO: 0.06 K/UL — SIGNIFICANT CHANGE UP (ref 0–0.2)
BASOPHILS NFR BLD AUTO: 0.7 % — SIGNIFICANT CHANGE UP (ref 0–2)
BUN SERPL-MCNC: 16 MG/DL — SIGNIFICANT CHANGE UP (ref 7–23)
CALCIUM SERPL-MCNC: 8.9 MG/DL — SIGNIFICANT CHANGE UP (ref 8.5–10.1)
CHLORIDE SERPL-SCNC: 105 MMOL/L — SIGNIFICANT CHANGE UP (ref 96–108)
CO2 SERPL-SCNC: 27 MMOL/L — SIGNIFICANT CHANGE UP (ref 22–31)
CREAT SERPL-MCNC: 1 MG/DL — SIGNIFICANT CHANGE UP (ref 0.5–1.3)
EOSINOPHIL # BLD AUTO: 0.1 K/UL — SIGNIFICANT CHANGE UP (ref 0–0.5)
EOSINOPHIL NFR BLD AUTO: 1.1 % — SIGNIFICANT CHANGE UP (ref 0–6)
GLUCOSE SERPL-MCNC: 96 MG/DL — SIGNIFICANT CHANGE UP (ref 70–99)
HCT VFR BLD CALC: 47.5 % — SIGNIFICANT CHANGE UP (ref 39–50)
HGB BLD-MCNC: 15.7 G/DL — SIGNIFICANT CHANGE UP (ref 13–17)
IMM GRANULOCYTES NFR BLD AUTO: 0.3 % — SIGNIFICANT CHANGE UP (ref 0–1.5)
LYMPHOCYTES # BLD AUTO: 2.46 K/UL — SIGNIFICANT CHANGE UP (ref 1–3.3)
LYMPHOCYTES # BLD AUTO: 26.7 % — SIGNIFICANT CHANGE UP (ref 13–44)
MAGNESIUM SERPL-MCNC: 2.2 MG/DL — SIGNIFICANT CHANGE UP (ref 1.6–2.6)
MCHC RBC-ENTMCNC: 30.8 PG — SIGNIFICANT CHANGE UP (ref 27–34)
MCHC RBC-ENTMCNC: 33.1 GM/DL — SIGNIFICANT CHANGE UP (ref 32–36)
MCV RBC AUTO: 93.1 FL — SIGNIFICANT CHANGE UP (ref 80–100)
MONOCYTES # BLD AUTO: 0.4 K/UL — SIGNIFICANT CHANGE UP (ref 0–0.9)
MONOCYTES NFR BLD AUTO: 4.3 % — SIGNIFICANT CHANGE UP (ref 2–14)
NEUTROPHILS # BLD AUTO: 6.18 K/UL — SIGNIFICANT CHANGE UP (ref 1.8–7.4)
NEUTROPHILS NFR BLD AUTO: 66.9 % — SIGNIFICANT CHANGE UP (ref 43–77)
PHOSPHATE SERPL-MCNC: 2.6 MG/DL — SIGNIFICANT CHANGE UP (ref 2.5–4.5)
PLATELET # BLD AUTO: 214 K/UL — SIGNIFICANT CHANGE UP (ref 150–400)
POTASSIUM SERPL-MCNC: 3.8 MMOL/L — SIGNIFICANT CHANGE UP (ref 3.5–5.3)
POTASSIUM SERPL-SCNC: 3.8 MMOL/L — SIGNIFICANT CHANGE UP (ref 3.5–5.3)
RBC # BLD: 5.1 M/UL — SIGNIFICANT CHANGE UP (ref 4.2–5.8)
RBC # FLD: 13.8 % — SIGNIFICANT CHANGE UP (ref 10.3–14.5)
SODIUM SERPL-SCNC: 138 MMOL/L — SIGNIFICANT CHANGE UP (ref 135–145)
WBC # BLD: 9.23 K/UL — SIGNIFICANT CHANGE UP (ref 3.8–10.5)
WBC # FLD AUTO: 9.23 K/UL — SIGNIFICANT CHANGE UP (ref 3.8–10.5)

## 2019-09-30 RX ORDER — CARBIDOPA AND LEVODOPA 25; 100 MG/1; MG/1
1 TABLET ORAL THREE TIMES A DAY
Refills: 0 | Status: DISCONTINUED | OUTPATIENT
Start: 2019-09-30 | End: 2019-10-01

## 2019-09-30 RX ORDER — OMEPRAZOLE 10 MG/1
1 CAPSULE, DELAYED RELEASE ORAL
Qty: 0 | Refills: 0 | DISCHARGE

## 2019-09-30 RX ORDER — ACETAMINOPHEN 500 MG
1000 TABLET ORAL ONCE
Refills: 0 | Status: COMPLETED | OUTPATIENT
Start: 2019-09-30 | End: 2019-09-30

## 2019-09-30 RX ADMIN — CARBIDOPA AND LEVODOPA 1 TABLET(S): 25; 100 TABLET ORAL at 15:23

## 2019-09-30 RX ADMIN — GABAPENTIN 100 MILLIGRAM(S): 400 CAPSULE ORAL at 15:25

## 2019-09-30 RX ADMIN — LOSARTAN POTASSIUM 50 MILLIGRAM(S): 100 TABLET, FILM COATED ORAL at 04:54

## 2019-09-30 RX ADMIN — GABAPENTIN 100 MILLIGRAM(S): 400 CAPSULE ORAL at 21:16

## 2019-09-30 RX ADMIN — CARBIDOPA AND LEVODOPA 1 TABLET(S): 25; 100 TABLET ORAL at 21:16

## 2019-09-30 RX ADMIN — PANTOPRAZOLE SODIUM 40 MILLIGRAM(S): 20 TABLET, DELAYED RELEASE ORAL at 04:54

## 2019-09-30 RX ADMIN — Medication 400 MILLIGRAM(S): at 05:12

## 2019-09-30 RX ADMIN — GABAPENTIN 100 MILLIGRAM(S): 400 CAPSULE ORAL at 04:53

## 2019-09-30 RX ADMIN — OXYCODONE HYDROCHLORIDE 5 MILLIGRAM(S): 5 TABLET ORAL at 22:30

## 2019-09-30 RX ADMIN — ATORVASTATIN CALCIUM 20 MILLIGRAM(S): 80 TABLET, FILM COATED ORAL at 04:54

## 2019-09-30 RX ADMIN — OXYCODONE HYDROCHLORIDE 5 MILLIGRAM(S): 5 TABLET ORAL at 21:16

## 2019-09-30 RX ADMIN — Medication 81 MILLIGRAM(S): at 15:23

## 2019-09-30 RX ADMIN — Medication 1000 MILLIGRAM(S): at 06:17

## 2019-09-30 RX ADMIN — ATORVASTATIN CALCIUM 20 MILLIGRAM(S): 80 TABLET, FILM COATED ORAL at 21:16

## 2019-09-30 NOTE — CONSULT NOTE ADULT - SUBJECTIVE AND OBJECTIVE BOX
Patient is a 87y old  Male who presents with a chief complaint of Syncope (29 Sep 2019 14:15)      HPI:  19- Cardiology Consultation : Patient seen due to loss of consciousness. Patient recalls sitting at breakfast and having sciatica pain in the left leg. He has no other memory of the events. His family and health aide present noted he developed a vacant stare, stopped talking and appeared pale. He recovered sponaneously in 1 minute and was brought to the hospital by the family. A similar episode occurred 1 month ago lasting 45 seconds. The patient has no complaints at present. He is treated for CAD, post CABG about 5 years ago, HBP, Parkinson's like tremor, HLD, lumbar spine stenosis. He has a known RBBB.     88 y/o male with PMHx of anxiety, back pain, BPH, CAD s/p stent, RBBB, left foot drop, HLD, HTN, lumbar radiculopathy, lumbar stenosis, melanoma, s/p cholecystectomy, s/p CABG x3, s/p discectomy for herniated nucleus pulposus presents to  for evaulation of loss of consciosuness.   HIstory gathered by patients family: Episode started early today.pt was sitting at the table eating breakfast and talking, when he started to mumble, subsequently pt grew silent, and had a vacant stare and looked pale. pisode last about 1 minute. EMS was called and on arrival patient regained consciouness. police officers gave him oxygen. patient refused to be transported to [facility]. therefore family brought patient in.  Noted by family , patient has a similar episode 1montn lasting 45 seconds per family.  started to produce nonsensiical speech.  In speaking with patient , patient reports this am he was doing well. remembers the entire day. remembers being in the kitchen.  patient reprots he was thinking about his. "Sciatica" and was not paying attention to his family.  He reports she does not remember the event, does not remember losing time. no prodromal symptoms.  He has a cardiologist Dr. Noble. His neurologist is Dr. Rutherford. he does report having an MRI of  his spine  phoebe disk herniation was found. he has not had an mri of his head. His neurologist did diagnsoe  Patients caregiver at bedside. She reports 5 family members were in kitchen that witnessed the event.  she reports the event as " 1 min of staring" , however she reports no LOC  Er course:Patient vitals stable Afebrile. Not tachy, trop negative x 1. EKG: on my read: sinus tachy at 104 with PVC. CT head negative  Labs : CBC and BMP and U/A insignificant (29 Sep 2019 14:15)      PAST MEDICAL & SURGICAL HISTORY:  Back pain  CAD (coronary artery disease) of artery bypass graft  Anxiety  Melanoma  Stented coronary artery:   Lumbar stenosis  Lumbar radiculopathy  BPH (benign prostatic hyperplasia)  Tremors of nervous system  Foot drop, left  GERD (gastroesophageal reflux disease)  HTN - Hypertension  H/O hyperlipidemia  Elective surgery: excision of melanoma  History of cataract surgery: IOL  History of cholecystectomy:   S/P CABG x 3:   S/P colonoscopy  S/P discectomy for herniated nucleus pulposus: ,  with left dropped foot         Allergies:  	No Known Allergies:     Home Medications:   * Patient Currently Takes Medications as of 29-Sep-2019 16:19 documented in Structured Notes  · 	methocarbamol 500 mg oral tablet: Last Dose Taken:  , 1 tab(s) orally every 6 hours, As needed, Back Spasms  · 	oxyCODONE 5 mg oral tablet: Last Dose Taken:  , 1 tab(s) orally every 6 hours, As Needed- for moderate pain MDD:4  · 	Avapro 300 mg oral tablet: Last Dose Taken:  , 1 tab(s) orally once a day  · 	Crestor 5 mg oral tablet: Last Dose Taken:  , 1 tab(s) orally once a day AM  · 	CoQ10 300 mg oral capsule: Last Dose Taken:  , 1 cap(s) orally once a day  · 	omeprazole 40 mg oral delayed release capsule: Last Dose Taken:  , 1 cap(s) orally once a day  · 	Aleve 220 mg oral tablet: Last Dose Taken:  , 1 tab(s) orally every 8 hours, As Needed-stop 7 days pre-op  · 	gabapentin 100 mg oral capsule: Last Dose Taken:  , 1 cap(s) orally 3 times a day    MEDICATIONS  (STANDING):  aspirin  chewable 81 milliGRAM(s) Oral daily  atorvastatin 20 milliGRAM(s) Oral at bedtime  carbidopa/levodopa  10/100 1 Tablet(s) Oral <User Schedule>  gabapentin 100 milliGRAM(s) Oral three times a day  influenza   Vaccine 0.5 milliLiter(s) IntraMuscular once  losartan 50 milliGRAM(s) Oral daily  pantoprazole    Tablet 40 milliGRAM(s) Oral before breakfast    MEDICATIONS  (PRN):  oxyCODONE    IR 5 milliGRAM(s) Oral every 6 hours PRN Moderate Pain (4 - 6)      FAMILY HISTORY:  No pertinent family history in first degree relatives      Social History:  Social History (marital status, living situation, occupation, tobacco use, alcohol and drug use, and sexual history): denies smoking, tobacco, or receational drugs.	    Tobacco Screening:  · Core Measure Site	Yes	  · Has the patient used tobacco in the past 30 days?	No	    SOCIAL HISTORY:  Tobacco-           Alcohol-              Illicit drugs-              Occupation-              Marital  status-  REVIEW OF SYSTEM:  Pertinent items are noted in HPI.    Vital Signs Last 24 Hrs  T(C): 36.7 (30 Sep 2019 04:44), Max: 36.9 (29 Sep 2019 10:12)  T(F): 98 (30 Sep 2019 04:44), Max: 98.4 (29 Sep 2019 10:12)  HR: 61 (30 Sep 2019 05:48) (61 - 83)  BP: 123/80 (30 Sep 2019 05:48) (123/80 - 179/85)  BP(mean): --  RR: 18 (30 Sep 2019 04:44) (18 - 18)  SpO2: 99% (30 Sep 2019 04:44) (99% - 100%)    I&O's Summary    29 Sep 2019 07:01  -  30 Sep 2019 07:00  --------------------------------------------------------  IN: 0 mL / OUT: 880 mL / NET: -880 mL      PHYSICAL EXAM  General Appearance: alert and oriented.   HEENT: PERRL, conjunctiva clear, EOM's intact, non injected pharynx, no exudate, TM   normal  Neck: Supple, , no adenopathy, thyroid: not enlarged, no carotid bruit or JVD  Back: Symmetric, no  tenderness,no soft tissue tenderness  Lungs: Clear to auscultation bilaterally,no adventitious breath sounds, normal   expiratory phase  Heart: Regular rate and rhythm, S1, S2 normal, no murmur, rub or gallop. Sternal scar.  Abdomen: Soft, non-tender, bowel sounds active , no hepatosplenomegaly  Extremities: no cyanosis or edema, no joint swelling  Skin: Skin color, texture normal, no rashes   Neurologic: Alert and oriented X3 , tremor of hands noted.       INTERPRETATION OF TELEMETRY:    ECG:sinus rhythm 65 BPM, RBBB, possible IWMI age undetermined.         LABS:                          15.7   9.23  )-----------( 214      ( 30 Sep 2019 08:01 )             47.5     -    138  |  105  |  16  ----------------------------<  96  3.8   |  27  |  1.00    Ca    8.9      30 Sep 2019 08:01  Phos  2.6       Mg     2.2         TPro  7.0  /  Alb  3.7  /  TBili  0.7  /  DBili  x   /  AST  20  /  ALT  28  /  AlkPhos  75  -    CARDIAC MARKERS ( 29 Sep 2019 20:25 )  <0.015 ng/mL / x     / x     / x     / x      CARDIAC MARKERS ( 29 Sep 2019 10:28 )  <0.015 ng/mL / x     / x     / x     / x              PT/INR - ( 29 Sep 2019 10:28 )   PT: 11.3 sec;   INR: 1.02 ratio         PTT - ( 29 Sep 2019 10:28 )  PTT:32.6 sec  Urinalysis Basic - ( 29 Sep 2019 13:01 )    Color: Yellow / Appearance: Clear / S.010 / pH: x  Gluc: x / Ketone: Trace  / Bili: Negative / Urobili: Negative mg/dL   Blood: x / Protein: 30 mg/dL / Nitrite: Negative   Leuk Esterase: Negative / RBC: 0-2 /HPF / WBC 0-2   Sq Epi: x / Non Sq Epi: Occasional / Bacteria: Occasional            RADIOLOGY & ADDITIONAL STUDIES:    IMPRESSION:  1. Syncope consistent with vasovagal episode and or orthostatic hypotension.  2.RBBB. Unchanged.  3.HBP.  4. CAD S/P CABG. Stable.  5.Parkinsons like tremor.  PLAN:  Check orthostatic vitals. Continue cardiac monitoring. Outpatient cardiac monitoring is indicated. Neurology w/u in progress.   Continue ASA, statin losartan. Will follow.

## 2019-09-30 NOTE — PROGRESS NOTE ADULT - SUBJECTIVE AND OBJECTIVE BOX
Patient is a 88 y/o/f with past medical history of anxiety, back pain, BPH, CAD s/p stent, RBBB, left foot drop, HLD, HTN, lumbar radiculopathy, lumbar stenosis, melanoma, s/p cholecystectomy, s/p CABG x3, s/p discectomy for herniated nucleus pulposus presents to  for evaulation of loss of consciousness.  Episode started early today. pt was sitting at the table eating breakfast and talking, when he started to mumble, subsequently pt grew silent, and had a vacant stare and looked pale. pisode last about 1 minute. EMS was called and on arrival patient regained consciouness. police officers gave him oxygen. patient refused to be transported to [facility]". therefore family brought patient in.  Noted by family , patient has a similar episode 1montn lasting 45 seconds per family.  started to produce nonsensiical speech.  In speaking with patient , patient reports this am he was doing well. remembers the entire day. remembers being in the kitchen.  patient reprots he was thinking about his. "Sciatica" and was not paying attention to his family.  He reports she does not remember the event, does not remember losing time. no prodromal symptoms.  He has a cardiologist Dr. Noble. His neurologist is Dr. Rutherford. he does report having an MRI of  his spine  phoebe disk herniation was found. he has not had an mri of his head. His neurologist did diagnsoe  REVIEW OF SYSTEMS:  General: NAD, hemodynamically stable, (-)  fever, (-) chills, (-) weakness  HEENT:  Eyes:  No visual loss, blurred vision, double vision or yellow sclerae. Ears, Nose, Throat:  No hearing loss, sneezing, congestion, runny nose or sore throat.  SKIN:  No rash or itching.  CARDIOVASCULAR:  No chest pain, chest pressure or chest discomfort. No palpitations or edema.  RESPIRATORY:  No shortness of breath, cough or sputum.  GASTROINTESTINAL:  No anorexia, nausea, vomiting or diarrhea. No abdominal pain or blood.  NEUROLOGICAL:  No headache, dizziness, syncope, paralysis, ataxia, numbness or tingling in the extremities. No change in bowel or bladder control.  MUSCULOSKELETAL:  No muscle, back pain, joint pain or stiffness.  HEMATOLOGIC:  No anemia, bleeding or bruising.  LYMPHATICS:  No enlarged nodes. No history of splenectomy.  ENDOCRINOLOGIC:  No reports of sweating, cold or heat intolerance. No polyuria or polydipsia.  ALLERGIES:  No history of asthma, hives, eczema or rhinitis.    Physical Exam:   GENERAL APPEARANCE:  NAD, hemodynamically stable  T(C): 36.6 (19 @ 10:33), Max: 36.7 (19 @ 20:28)  HR: 71 (19 @ 10:33) (61 - 71)  BP: 131/74 (19 @ 10:33) (123/80 - 179/85)  RR: 18 (19 @ 10:33) (18 - 18)  SpO2: 96% (19 @ 10:33) (96% - 100%)  Wt(kg): --  HEENT:  Head is normocephalic    Skin:  Warm and dry without any rash   NECK:  Supple without lymphadenopathy.   HEART:  Regular rate and rhythm. normal S1 and S2, No M/R/G  LUNGS:  Good ins/exp effort, no W/R/R/C  ABDOMEN:  Soft, nontender, nondistended with good bowel sounds heard  EXTREMITIES:  Without cyanosis, clubbing or edema.   NEUROLOGICAL:  Gross nonfocal     Labs:   CBC Full  -  ( 30 Sep 2019 08:01 )  WBC Count : 9.23 K/uL  RBC Count : 5.10 M/uL  Hemoglobin : 15.7 g/dL  Hematocrit : 47.5 %  Platelet Count - Automated : 214 K/uL    PT/INR - ( 29 Sep 2019 10:28 )   PT: 11.3 sec;   INR: 1.02 ratio         PTT - ( 29 Sep 2019 10:28 )  PTT:32.6 sec  Urinalysis Basic - ( 29 Sep 2019 13:01 )    Color: Yellow / Appearance: Clear / S.010 / pH: x  Gluc: x / Ketone: Trace  / Bili: Negative / Urobili: Negative mg/dL   Blood: x / Protein: 30 mg/dL / Nitrite: Negative   Leuk Esterase: Negative / RBC: 0-2 /HPF / WBC 0-2   Sq Epi: x / Non Sq Epi: Occasional / Bacteria: Occasional    138  |  105  |  16  ----------------------------<  96  3.8   |  27  |  1.00    Ca    8.9      30 Sep 2019 08:01  Phos  2.6     -  Mg     2.2         TPro  7.0  /  Alb  3.7  /  TBili  0.7  /  DBili  x   /  AST  20  /  ALT  28  /  AlkPhos  75  -      # Syncope suspected vasovagal in origin vs autonomic dysregulation in the setting of parkinson's vs cardiac  - tele monitor  - Check orthostatic vitals  - Continue cardiac monitoring. Outpatient cardiac monitoring is indicated. Neurology w/u in progress.   - will obtain EEG  - Continue ASA, statin losartan.      # Back pain  - c/w pain management    # Parkinsonianism  - c/w home dose of sinemet.. twice  a day    # s/p CABG  - c/w baby asa    # GERD  - continue with pepcid and protonix  - based on hie: patient on rantidine and omeprazole Patient is a 86 y/o/f with past medical history of anxiety, back pain, BPH, CAD s/p stent, RBBB, left foot drop, HLD, HTN, lumbar radiculopathy, lumbar stenosis, melanoma, s/p cholecystectomy, s/p CABG x3, s/p discectomy for herniated nucleus pulposus presents to  for evaulation of loss of consciousness.  Episode started early today. pt was sitting at the table eating breakfast and talking, when he started to mumble, subsequently pt grew silent, and had a vacant stare and looked pale. pisode last about 1 minute. EMS was called and on arrival patient regained consciouness. police officers gave him oxygen. patient refused to be transported to [facility]. therefore family brought patient in.  Noted by family , patient has a similar episode 1montn lasting 45 seconds per family.  started to produce nonsensiical speech.  In speaking with patient , patient reports this am he was doing well. remembers the entire day. remembers being in the kitchen.  patient reprots he was thinking about his. "Sciatica" and was not paying attention to his family.  He reports she does not remember the event, does not remember losing time. no prodromal symptoms.  He has a cardiologist Dr. Noble. His neurologist is Dr. Rutherford. he does report having an MRI of  his spine  phoebe disk herniation was found. he has not had an mri of his head.   : admission note reviewed. Patient comfortable. Denies any HA, CP, SOB. Patient is pending EEG /  orthostatic workup. Cardio /  neurology eval appreciated    REVIEW OF SYSTEMS:  General: NAD, hemodynamically stable, (-)  fever, (-) chills, (-) weakness  HEENT:  Eyes:  No visual loss, blurred vision, double vision or yellow sclerae. Ears, Nose, Throat:  No hearing loss, sneezing, congestion, runny nose or sore throat.  SKIN:  No rash or itching.  CARDIOVASCULAR:  No chest pain, chest pressure or chest discomfort. No palpitations or edema.  RESPIRATORY:  No shortness of breath, cough or sputum.  GASTROINTESTINAL:  No anorexia, nausea, vomiting or diarrhea. No abdominal pain or blood.  NEUROLOGICAL:  No headache, dizziness, syncope, paralysis, ataxia, numbness or tingling in the extremities. No change in bowel or bladder control.  MUSCULOSKELETAL:  No muscle, back pain, joint pain or stiffness.  HEMATOLOGIC:  No anemia, bleeding or bruising.  LYMPHATICS:  No enlarged nodes. No history of splenectomy.  ENDOCRINOLOGIC:  No reports of sweating, cold or heat intolerance. No polyuria or polydipsia.  ALLERGIES:  No history of asthma, hives, eczema or rhinitis.    Physical Exam:   GENERAL APPEARANCE:  elderly, deconditioned, frail  T(C): 36.6 (19 @ 10:33), Max: 36.7 (19 @ 20:28)  HR: 71 (19 @ 10:33) (61 - 71)  BP: 131/74 (19 @ 10:33) (123/80 - 179/85)  RR: 18 (19 @ 10:33) (18 - 18)  SpO2: 96% (19 @ 10:33) (96% - 100%)  Wt(kg): --  HEENT:  Head is normocephalic    Skin:  Warm and dry without any rash   NECK:  Supple without lymphadenopathy.   HEART:  Regular rate and rhythm. normal S1 and S2, No M/R/G  LUNGS:  Good ins/exp effort, no W/R/R/C  ABDOMEN:  Soft, nontender, nondistended with good bowel sounds heard  EXTREMITIES:  Without cyanosis, clubbing or edema.   NEUROLOGICAL:  Gross nonfocal     Labs:   CBC Full  -  ( 30 Sep 2019 08:01 )  WBC Count : 9.23 K/uL  RBC Count : 5.10 M/uL  Hemoglobin : 15.7 g/dL  Hematocrit : 47.5 %  Platelet Count - Automated : 214 K/uL    PT/INR - ( 29 Sep 2019 10:28 )   PT: 11.3 sec;   INR: 1.02 ratio         PTT - ( 29 Sep 2019 10:28 )  PTT:32.6 sec  Urinalysis Basic - ( 29 Sep 2019 13:01 )    Color: Yellow / Appearance: Clear / S.010 / pH: x  Gluc: x / Ketone: Trace  / Bili: Negative / Urobili: Negative mg/dL   Blood: x / Protein: 30 mg/dL / Nitrite: Negative   Leuk Esterase: Negative / RBC: 0-2 /HPF / WBC 0-2   Sq Epi: x / Non Sq Epi: Occasional / Bacteria: Occasional    138  |  105  |  16  ----------------------------<  96  3.8   |  27  |  1.00    Ca    8.9      30 Sep 2019 08:01  Phos  2.6       Mg     2.2         TPro  7.0  /  Alb  3.7  /  TBili  0.7  /  DBili  x   /  AST  20  /  ALT  28  /  AlkPhos  75        # Syncope suspected vasovagal in origin vs autonomic dysregulation in the setting of parkinson's vs cardiac  - tele monitor  - Check orthostatic vitals  - Continue cardiac monitoring. Outpatient cardiac monitoring is indicated. Neurology w/u in progress.   - will obtain EEG  - Continue ASA, statin losartan.      # Back pain  - c/w pain management    # Parkinsonianism  - c/w home dose of sinemet.. twice  a day    # s/p CABG  - c/w baby asa    # GERD  - continue with pepcid and protonix  - based on hie: patient on rantidine and omeprazole Patient is a 88 y/o/f with past medical history of anxiety, back pain, BPH, CAD s/p stent, RBBB, left foot drop, HLD, HTN, lumbar radiculopathy, lumbar stenosis, melanoma, s/p cholecystectomy, s/p CABG x3, s/p discectomy for herniated nucleus pulposus presents to  for evaulation of loss of consciousness.  Episode started early today. pt was sitting at the table eating breakfast and talking, when he started to mumble, subsequently pt grew silent, and had a vacant stare and looked pale. pisode last about 1 minute. EMS was called and on arrival patient regained consciouness. police officers gave him oxygen. patient refused to be transported to [facility]. therefore family brought patient in.  Noted by family , patient has a similar episode 1montn lasting 45 seconds per family.  started to produce nonsensiical speech.  : admission note reviewed. Patient comfortable. States, " if you ask me, I did not even pass out".  Denies any HA, CP, SOB. Patient is pending EEG /  pending orthostatic workup. Cardio /  neurology eval appreciated. Patient has a full time aid at home. Anticipated d/c planning in the am.   Addendum: Patient was not able to have an EEG as patient has a artificial hair.     REVIEW OF SYSTEMS:  General: NAD, hemodynamically stable, + weakness  HEENT:  no change of vision  SKIN: no new rashes  CARDIOVASCULAR:  No chest pain  RESPIRATORY:  No shortness of breath  GASTROINTESTINAL: No abdominal pain  NEUROLOGICAL:  No headache, dizziness, syncope, paralysis, ataxia, numbness or tingling in the extremities. No change in bowel or bladder control.  MUSCULOSKELETAL:  No muscle, back pain, joint pain or stiffness.  HEMATOLOGIC:  No anemia, bleeding or bruising.  LYMPHATICS:  No enlarged nodes. No history of splenectomy.  ENDOCRINOLOGIC:  No reports of sweating, cold or heat intolerance. No polyuria or polydipsia.  ALLERGIES:  No history of asthma, hives, eczema or rhinitis.    Physical Exam:   GENERAL APPEARANCE:  elderly, deconditioned, frail  T(C): 36.6 (19 @ 10:33), Max: 36.7 (19 @ 20:28)  HR: 71 (19 @ 10:33) (61 - 71)  BP: 131/74 (19 @ 10:33) (123/80 - 179/85)  RR: 18 (19 @ 10:33) (18 - 18)  SpO2: 96% (19 @ 10:33) (96% - 100%)  Wt(kg): --  HEENT:  Head is normocephalic    Skin:  Warm and dry without any rash   NECK:  Supple without lymphadenopathy.   HEART:  Regular rate and rhythm. normal S1 and S2, No M/R/G  LUNGS:  Good ins/exp effort, no W/R/R/C  ABDOMEN:  Soft, nontender, nondistended with good bowel sounds heard  EXTREMITIES:  Without cyanosis, clubbing or edema.   NEUROLOGICAL:  Gross nonfocal     Labs:   CBC Full  -  ( 30 Sep 2019 08:01 )  WBC Count : 9.23 K/uL  RBC Count : 5.10 M/uL  Hemoglobin : 15.7 g/dL  Hematocrit : 47.5 %  Platelet Count - Automated : 214 K/uL    PT/INR - ( 29 Sep 2019 10:28 )   PT: 11.3 sec;   INR: 1.02 ratio         PTT - ( 29 Sep 2019 10:28 )  PTT:32.6 sec  Urinalysis Basic - ( 29 Sep 2019 13:01 )    Color: Yellow / Appearance: Clear / S.010 / pH: x  Gluc: x / Ketone: Trace  / Bili: Negative / Urobili: Negative mg/dL   Blood: x / Protein: 30 mg/dL / Nitrite: Negative   Leuk Esterase: Negative / RBC: 0-2 /HPF / WBC 0-2   Sq Epi: x / Non Sq Epi: Occasional / Bacteria: Occasional    138  |  105  |  16  ----------------------------<  96  3.8   |  27  |  1.00    Ca    8.9      30 Sep 2019 08:01  Phos  2.6     -  Mg     2.2     -30    TPro  7.0  /  Alb  3.7  /  TBili  0.7  /  DBili  x   /  AST  20  /  ALT  28  /  AlkPhos  75  -      # Syncope suspected vasovagal in origin vs autonomic dysregulation in the setting of parkinson's vs cardiac  - tele monitor  - Check orthostatic vitals  - Continue cardiac monitoring. Outpatient cardiac monitoring is indicated. Neurology w/u in progress.   - will obtain EEG  - Continue ASA, statin losartan.      # Back pain  - c/w pain management    # Parkinsonianism  - c/w home dose of sinemet.. twice  a day    # s/p CABG  - c/w baby asa    # GERD  - continue with pepcid and protonix  - based on hie: patient on rantidine and omeprazole

## 2019-09-30 NOTE — CONSULT NOTE ADULT - ASSESSMENT
87 year old man hx of parkinsonism, admitted for transient unresponsiveness. CT of head showed no acute changes. Doubt TIA/CVA, no hx of prior seizures. ? vaso vagal syncope.

## 2019-10-01 ENCOUNTER — TRANSCRIPTION ENCOUNTER (OUTPATIENT)
Age: 84
End: 2019-10-01

## 2019-10-01 VITALS
SYSTOLIC BLOOD PRESSURE: 126 MMHG | TEMPERATURE: 98 F | HEART RATE: 73 BPM | DIASTOLIC BLOOD PRESSURE: 78 MMHG | RESPIRATION RATE: 18 BRPM

## 2019-10-01 DIAGNOSIS — R55 SYNCOPE AND COLLAPSE: ICD-10-CM

## 2019-10-01 RX ORDER — ATORVASTATIN CALCIUM 80 MG/1
20 TABLET, FILM COATED ORAL AT BEDTIME
Refills: 0 | Status: DISCONTINUED | OUTPATIENT
Start: 2019-10-01 | End: 2019-10-01

## 2019-10-01 RX ORDER — CARBIDOPA AND LEVODOPA 25; 100 MG/1; MG/1
1 TABLET ORAL
Qty: 0 | Refills: 0 | DISCHARGE

## 2019-10-01 RX ORDER — TAMSULOSIN HYDROCHLORIDE 0.4 MG/1
0.4 CAPSULE ORAL AT BEDTIME
Refills: 0 | Status: DISCONTINUED | OUTPATIENT
Start: 2019-10-01 | End: 2019-10-01

## 2019-10-01 RX ORDER — ASPIRIN/CALCIUM CARB/MAGNESIUM 324 MG
81 TABLET ORAL DAILY
Refills: 0 | Status: DISCONTINUED | OUTPATIENT
Start: 2019-10-01 | End: 2019-10-01

## 2019-10-01 RX ORDER — ACETAMINOPHEN 500 MG
650 TABLET ORAL ONCE
Refills: 0 | Status: COMPLETED | OUTPATIENT
Start: 2019-10-01 | End: 2019-10-01

## 2019-10-01 RX ADMIN — GABAPENTIN 100 MILLIGRAM(S): 400 CAPSULE ORAL at 05:52

## 2019-10-01 RX ADMIN — CARBIDOPA AND LEVODOPA 1 TABLET(S): 25; 100 TABLET ORAL at 05:52

## 2019-10-01 RX ADMIN — Medication 81 MILLIGRAM(S): at 13:01

## 2019-10-01 RX ADMIN — GABAPENTIN 100 MILLIGRAM(S): 400 CAPSULE ORAL at 13:01

## 2019-10-01 RX ADMIN — CARBIDOPA AND LEVODOPA 1 TABLET(S): 25; 100 TABLET ORAL at 13:03

## 2019-10-01 RX ADMIN — Medication 650 MILLIGRAM(S): at 01:30

## 2019-10-01 RX ADMIN — PANTOPRAZOLE SODIUM 40 MILLIGRAM(S): 20 TABLET, DELAYED RELEASE ORAL at 05:52

## 2019-10-01 RX ADMIN — LOSARTAN POTASSIUM 50 MILLIGRAM(S): 100 TABLET, FILM COATED ORAL at 05:52

## 2019-10-01 RX ADMIN — Medication 650 MILLIGRAM(S): at 00:25

## 2019-10-01 NOTE — DISCHARGE NOTE PROVIDER - CARE PROVIDERS DIRECT ADDRESSES
[email or web address]

## 2019-10-01 NOTE — DISCHARGE NOTE PROVIDER - NSDCCPCAREPLAN_GEN_ALL_CORE_FT
PRINCIPAL DISCHARGE DIAGNOSIS  Diagnosis: Syncope  Assessment and Plan of Treatment: your were diagnosed with vasovagal syncope- please follow up with Dr birmingham in 1 week to consider long term heart monitor like the Loop recorder. please also follow up with your cardiologist in 1 week, follow up with  neurology to consider EEG as outpt, follow up with your primary doctor in1  week. you were seen by cardiology and neurology in patient and recommended further outpatient evalaution as stated above PRINCIPAL DISCHARGE DIAGNOSIS  Diagnosis: Syncope  Assessment and Plan of Treatment: PLEASE DO NOT DRIVE DUE TO  RISK OF SIMILAR EVENT DURING DRIVING  your were diagnosed with vasovagal syncope- please follow up with Dr birmingham in 1 week to consider long term heart monitor like the Loop recorder. please also follow up with your cardiologist in 1 week, follow up with  neurology to consider EEG as outpt, follow up with your primary doctor in1  week. you were seen by cardiology and neurology in patient and recommended further outpatient evalaution as stated above

## 2019-10-01 NOTE — DISCHARGE NOTE PROVIDER - CARE PROVIDER_API CALL
Del Rodriguez)  Cardiovascular Disease; Internal Medicine  [address] [place] [address]  Phone: [phone]  Fax: [phone]  Follow Up Time:     Kee Lantigua)  Internal Medicine; Neurology  [address], [address]  [place] [address]  Phone: [phone]  Fax: [phone]  Follow Up Time:     Shmuel Lopez)  Cardiac Electrophysiology; Cardiovascular Disease  [address] [place] [address]  Phone: [phone]  Fax: [phone]  Follow Up Time:     Rubén Humphrey)  Internal Medicine  [address] [address]  [place] [address]  Phone: [phone]  Fax: [phone]  Follow Up Time:

## 2019-10-01 NOTE — PROGRESS NOTE ADULT - ASSESSMENT
1. Syncope consistent with vasovagal episode and or orthostatic hypotension.  2.RBBB. Unchanged.  3.HBP.  4. CAD S/P CABG. Stable.  5.Parkinsons like tremor.  PLAN:   Outpatient cardiac monitoring is indicated. Follow with EP dr birmingham regularly. ?linq  Neurology w/u in progress.   Continue ASA, statin losartan

## 2019-10-01 NOTE — DISCHARGE NOTE PROVIDER - NSDCFUSCHEDAPPT_GEN_ALL_CORE_FT
MASHA HER ; 11/05/2019 ; [facility] [address] [place] [address] MASHA HER ; 11/05/2019 ; [facility] [address] [place] [address] MASHA HER ; 11/05/2019 ; [facility] [address] [place] [address] MASHA HER ; 11/05/2019 ; [facility] [address] [place] [address]

## 2019-10-01 NOTE — PROGRESS NOTE ADULT - SUBJECTIVE AND OBJECTIVE BOX
Patient is a 87y old  Male who presents with a chief complaint of Syncope (30 Sep 2019 14:24)      HPI:  88 y/o male with PMHx of anxiety, back pain, BPH, CAD s/p stent, RBBB, left foot drop, HLD, HTN, lumbar radiculopathy, lumbar stenosis, melanoma, s/p cholecystectomy, s/p CABG x3, s/p discectomy for herniated nucleus pulposus presents to  for evaulation of loss of consciosuness.   HIstory gathered by patients family: Episode started early today.pt was sitting at the table eating breakfast and talking, when he started to mumble, subsequently pt grew silent, and had a vacant stare and looked pale. pisode last about 1 minute. EMS was called and on arrival patient regained consciouness. police officers gave him oxygen. patient refused to be transported to [facility]. therefore family brought patient in.  Noted by family , patient has a similar episode 1montn lasting 45 seconds per family.  started to produce nonsensiical speech.  In speaking with patient , patient reports this am he was doing well. remembers the entire day. remembers being in the kitchen.  patient reprots he was thinking about his. "Sciatica" and was not paying attention to his family.  He reports she does not remember the event, does not remember losing time. no prodromal symptoms.  He has a cardiologist Dr. Noble. His neurologist is Dr. Rutherford. he does report having an MRI of  his spine  phoebe disk herniation was found. he has not had an mri of his head. His neurologist did diagnsoe  Patients caregiver at bedside. She reports 5 family members were in kitchen that witnessed the event.  she reports the event as " 1 min of staring" , however she reports no LOC  Er course:Patient vitals stable Afebrile. Not tachy, trop negative x 1. EKG: on my read: sinus tachy at 104 with PVC. CT head negative  Labs : CBC and BMP and U/A insignificant (29 Sep 2019 14:15)    19- Cardiology Consultation : Patient seen due to loss of consciousness. Patient recalls sitting at breakfast and having sciatica pain in the left leg. He has no other memory of the events. His family and health aide present noted he developed a vacant stare, stopped talking and appeared pale. He recovered sponaneously in 1 minute and was brought to the hospital by the family. A similar episode occurred 1 month ago lasting 45 seconds. The patient has no complaints at present. He is treated for CAD, post CABG about 5 years ago, HBP, Parkinson's like tremor, HLD, lumbar spine stenosis. He has a known RBBB.   10/1 no cmplaints  PAST MEDICAL & SURGICAL HISTORY:  Back pain  CAD (coronary artery disease) of artery bypass graft  Anxiety  Melanoma  Stented coronary artery:   Lumbar stenosis  Lumbar radiculopathy  BPH (benign prostatic hyperplasia)  Tremors of nervous system  Foot drop, left  GERD (gastroesophageal reflux disease)  HTN - Hypertension  H/O hyperlipidemia  Elective surgery: excision of melanoma  History of cataract surgery: IOL  History of cholecystectomy:   S/P CABG x 3:   S/P colonoscopy  S/P discectomy for herniated nucleus pulposus: ,  with left dropped foot        MEDICATIONS  (STANDING):  aspirin  chewable 81 milliGRAM(s) Oral daily  atorvastatin 20 milliGRAM(s) Oral at bedtime  carbidopa/levodopa  25/100 1 Tablet(s) Oral three times a day  gabapentin 100 milliGRAM(s) Oral three times a day  influenza   Vaccine 0.5 milliLiter(s) IntraMuscular once  losartan 50 milliGRAM(s) Oral daily  pantoprazole    Tablet 40 milliGRAM(s) Oral before breakfast    MEDICATIONS  (PRN):  oxyCODONE    IR 5 milliGRAM(s) Oral every 6 hours PRN Moderate Pain (4 - 6)          Vital Signs Last 24 Hrs  T(C): 36.6 (01 Oct 2019 05:15), Max: 36.6 (30 Sep 2019 10:33)  T(F): 97.9 (01 Oct 2019 05:15), Max: 97.9 (01 Oct 2019 05:15)  HR: 71 (01 Oct 2019 05:15) (71 - 71)  BP: 158/89 (01 Oct 2019 05:15) (127/72 - 158/89)  BP(mean): --  RR: 19 (01 Oct 2019 05:15) (18 - 19)  SpO2: 100% (01 Oct 2019 05:15) (96% - 100%)    I&O's Summary      PHYSICAL EXAM  General Appearance: NAD  HEENT: NCAT  Neck:   Back:   Lungs: clear  Heart: rrs1s2  Abdomen: soft nt  Extremities:   Skin:   Neurologic:       INTERPRETATION OF TELEMETRY:    ECG:        LABS:                          15.7   9.23  )-----------( 214      ( 30 Sep 2019 08:01 )             47.5     09-    138  |  105  |  16  ----------------------------<  96  3.8   |  27  |  1.00    Ca    8.9      30 Sep 2019 08:01  Phos  2.6     09-30  Mg     2.2     -30    TPro  7.0  /  Alb  3.7  /  TBili  0.7  /  DBili  x   /  AST  20  /  ALT  28  /  AlkPhos  75  09-    CARDIAC MARKERS ( 29 Sep 2019 20:25 )  <0.015 ng/mL / x     / x     / x     / x      CARDIAC MARKERS ( 29 Sep 2019 10:28 )  <0.015 ng/mL / x     / x     / x     / x              PT/INR - ( 29 Sep 2019 10:28 )   PT: 11.3 sec;   INR: 1.02 ratio         PTT - ( 29 Sep 2019 10:28 )  PTT:32.6 sec  Urinalysis Basic - ( 29 Sep 2019 13:01 )    Color: Yellow / Appearance: Clear / S.010 / pH: x  Gluc: x / Ketone: Trace  / Bili: Negative / Urobili: Negative mg/dL   Blood: x / Protein: 30 mg/dL / Nitrite: Negative   Leuk Esterase: Negative / RBC: 0-2 /HPF / WBC 0-2   Sq Epi: x / Non Sq Epi: Occasional / Bacteria: Occasional            RADIOLOGY & ADDITIONAL STUDIES:

## 2019-10-01 NOTE — DISCHARGE NOTE PROVIDER - HOSPITAL COURSE
88 y/o/f with past medical history of anxiety, back pain, BPH, CAD s/p stent, RBBB, left foot drop, HLD, HTN, lumbar radiculopathy, lumbar stenosis, melanoma, s/p cholecystectomy, s/p CABG x3, s/p discectomy for herniated nucleus pulposus presents to  for evaulation of loss of consciousness.  Episode started early today. pt was sitting at the table eating breakfast and talking, when he started to mumble, subsequently pt grew silent, and had a vacant stare and looked pale. pisode last about 1 minute. EMS was called and on arrival patient regained consciouness. police officers gave him oxygen. patient refused to be transported to [facility]. therefore family brought patient in.    Noted by family , patient has a similar episode 1montn lasting 45 seconds per family.  started to produce nonsensiical speech.    9/30: admission note reviewed. Patient comfortable. States, " if you ask me, I did not even pass out".  Denies any HA, CP, SOB. Patient is pending EEG /  pending orthostatic workup. Cardio /  neurology eval appreciated. Patient has a full time aid at home. Anticipated d/c planning in the am.     Addendum: Patient was not able to have an EEG as patient has a artificial hair. HEENT:  Head is normocephalic      Skin:  Warm and dry without any rash     NECK:  Supple without lymphadenopathy.     HEART:  Regular rate and rhythm. normal S1 and S2, No M/R/G    LUNGS:  Good ins/exp effort, no W/R/R/C    ABDOMEN:  Soft, nontender, nondistended with good bowel sounds heard    EXTREMITIES:  Without cyanosis, clubbing or edema# Syncope suspected vasovagal in origin vs autonomic dysregulation in the setting of parkinson's vs cardiac    - tele monitor- Check orthostatic vitals    - Continue cardiac monitoring. Outpatient cardiac monitoring is indicated. Neurology w/u in progress.     - will obtain EEG    - Continue ASA, statin losartan.          # Back pain    - c/w pain management        # Parkinsonianism    - c/w home dose of sinemet.. twice  a day        # s/p CABG    - c/w baby asa        # GERD    - continue with pepcid and protonix    - based on hie: patient on rantidine and omeprazole 86 y/o/f with past medical history of anxiety, back pain, BPH, CAD s/p stent, RBBB, left foot drop, HLD, HTN, lumbar radiculopathy, lumbar stenosis, melanoma, s/p cholecystectomy, s/p CABG x3, s/p discectomy for herniated nucleus pulposus admitted with syncopal epsiode x1 ( 9/30/19), Head CT and MRI head negative for acute stroke or acute pathology. No arrhythmia on tele . EEg was ordered but unable to be performed  as pt has artifial hair.no evidence of  overt orthostatic hypotension  SBP on lying 142 and standing 145, DBP lying 70 and standing 85    cardiology and neurology cleared pt for further outpatient follow up with dr birmingham for possible outpatient ? LINQ    Also recommend follow up with neurology dr gregory or dr henriquez for EEG as outpatient     I explained findings  and plan with patient and wife      medically stable for discharge home    pt already has  full time home health aide    no further syncopal events inpatient     patient remainsed asymptomatic during course of hospitalisation and is eager to go home         . HEENT:  Head is normocephalic      Skin:  Warm and dry without any rash     NECK:  Supple     HEART:  Regular rate and rhythm. normal S1 and S2,     LUNGS:  Good ins/exp effort, no W/R/R/C    ABDOMEN:  Soft, nontender, nondistended    EXTREMITIES:  Without cyanosis, or edema        # Syncope suspected vasovagal in origin vs autonomic dysregulation in the setting of parkinson's     Continue ASA, statin losartan.      f/u dr birmingham for outpatient LINQ        # Back pain    - c/w pain management        # Parkinsonianism    - c/w home dose of sinemet.        # s/p CABG    - c/w baby asa        # GERD    - continue home meds        discharge time spent 55mins 86 y/o/f with past medical history of anxiety, back pain, BPH, CAD s/p stent, RBBB, left foot drop, HLD, HTN, lumbar radiculopathy, lumbar stenosis, melanoma, s/p cholecystectomy, s/p CABG x3, s/p discectomy for herniated nucleus pulposus admitted with syncopal epsiode x1 ( 9/30/19), Head CT and MRI head negative for acute stroke or acute pathology. No arrhythmia on tele . EEg was ordered but unable to be performed  as pt has artifial hair.no evidence of  overt orthostatic hypotension  SBP on lying 142 and standing 145, DBP lying 70 and standing 85    cardiology and neurology cleared pt for further outpatient follow up with dr birmingham for possible outpatient ? LINQ    Also recommend follow up with neurology dr gregory or dr henriquez for EEG as outpatient     I explained findings  and plan with patient and daughter yanet     medically stable for discharge home    pt already has  full time home health aide    no further syncopal events inpatient     patient remainsed asymptomatic during course of hospitalisation and is eager to go home         . HEENT:  Head is normocephalic      Skin:  Warm and dry without any rash     NECK:  Supple     HEART:  Regular rate and rhythm. normal S1 and S2,     LUNGS:  Good ins/exp effort, no W/R/R/C    ABDOMEN:  Soft, nontender, nondistended    EXTREMITIES:  Without cyanosis, or edema        # Syncope suspected vasovagal in origin vs autonomic dysregulation in the setting of parkinson's     Continue ASA, statin losartan.      f/u dr birmingham for outpatient LINQ        # Back pain    - c/w pain management        # Parkinsonianism    - c/w home dose of sinemet.        # s/p CABG    - c/w baby asa        # GERD    - continue home meds        discharge time spent 55mins

## 2019-10-01 NOTE — DISCHARGE NOTE NURSING/CASE MANAGEMENT/SOCIAL WORK - PATIENT PORTAL LINK FT
You can access the FollowMyHealth Patient Portal offered by [facility] by registering at the following website: [email or web address]. By joining [facility]’s FollowMyHealth portal, you will also be able to view your health information using other applications (apps) compatible with our system.

## 2019-10-01 NOTE — DISCHARGE NOTE PROVIDER - PROVIDER TOKENS
PROVIDER:[TOKEN:[[id number]]],PROVIDER:[TOKEN:[[id number]]],PROVIDER:[TOKEN:[[id number]]],PROVIDER:[TOKEN:[[id number]]]

## 2019-10-02 PROBLEM — Z60.2 PERSON LIVING ALONE: Status: ACTIVE | Noted: 2019-02-27

## 2019-10-10 DIAGNOSIS — K21.9 GASTRO-ESOPHAGEAL REFLUX DISEASE WITHOUT ESOPHAGITIS: ICD-10-CM

## 2019-10-10 DIAGNOSIS — I10 ESSENTIAL (PRIMARY) HYPERTENSION: ICD-10-CM

## 2019-10-10 DIAGNOSIS — G20 PARKINSON'S DISEASE: ICD-10-CM

## 2019-10-10 DIAGNOSIS — E78.5 HYPERLIPIDEMIA, UNSPECIFIED: ICD-10-CM

## 2019-10-10 DIAGNOSIS — F41.9 ANXIETY DISORDER, UNSPECIFIED: ICD-10-CM

## 2019-10-10 DIAGNOSIS — I45.10 UNSPECIFIED RIGHT BUNDLE-BRANCH BLOCK: ICD-10-CM

## 2019-10-10 DIAGNOSIS — N40.0 BENIGN PROSTATIC HYPERPLASIA WITHOUT LOWER URINARY TRACT SYMPTOMS: ICD-10-CM

## 2019-10-10 DIAGNOSIS — M21.372 FOOT DROP, LEFT FOOT: ICD-10-CM

## 2019-10-10 DIAGNOSIS — R55 SYNCOPE AND COLLAPSE: ICD-10-CM

## 2019-10-16 ENCOUNTER — NON-APPOINTMENT (OUTPATIENT)
Age: 84
End: 2019-10-16

## 2019-10-16 ENCOUNTER — APPOINTMENT (OUTPATIENT)
Dept: ELECTROPHYSIOLOGY | Facility: CLINIC | Age: 84
End: 2019-10-16
Payer: MEDICARE

## 2019-10-16 VITALS
SYSTOLIC BLOOD PRESSURE: 122 MMHG | HEIGHT: 69 IN | DIASTOLIC BLOOD PRESSURE: 65 MMHG | HEART RATE: 78 BPM | OXYGEN SATURATION: 99 % | WEIGHT: 173 LBS | BODY MASS INDEX: 25.62 KG/M2

## 2019-10-16 DIAGNOSIS — Z95.1 PRESENCE OF AORTOCORONARY BYPASS GRAFT: ICD-10-CM

## 2019-10-16 DIAGNOSIS — I25.10 ATHEROSCLEROTIC HEART DISEASE OF NATIVE CORONARY ARTERY W/OUT ANGINA PECTORIS: ICD-10-CM

## 2019-10-16 DIAGNOSIS — I45.10 UNSPECIFIED RIGHT BUNDLE-BRANCH BLOCK: ICD-10-CM

## 2019-10-16 PROCEDURE — 99215 OFFICE O/P EST HI 40 MIN: CPT

## 2019-10-16 PROCEDURE — 93000 ELECTROCARDIOGRAM COMPLETE: CPT

## 2019-10-16 RX ORDER — FLUTICASONE PROPIONATE 0.5 MG/G
0.05 CREAM TOPICAL TWICE DAILY
Qty: 1 | Refills: 2 | Status: DISCONTINUED | COMMUNITY
Start: 2018-12-03 | End: 2019-10-16

## 2019-10-16 RX ORDER — GABAPENTIN 100 MG/1
100 CAPSULE ORAL 3 TIMES DAILY
Qty: 90 | Refills: 0 | Status: DISCONTINUED | COMMUNITY
Start: 2019-09-26 | End: 2019-10-16

## 2019-10-16 RX ORDER — IRBESARTAN 300 MG/1
300 TABLET, FILM COATED ORAL DAILY
Refills: 0 | Status: ACTIVE | COMMUNITY

## 2019-10-16 RX ORDER — RANITIDINE 150 MG/1
150 TABLET ORAL
Qty: 180 | Refills: 0 | Status: DISCONTINUED | COMMUNITY
Start: 2017-04-14 | End: 2019-10-16

## 2019-10-16 RX ORDER — CIPROFLOXACIN HYDROCHLORIDE 500 MG/1
500 TABLET, FILM COATED ORAL
Qty: 20 | Refills: 0 | Status: DISCONTINUED | COMMUNITY
Start: 2019-06-24 | End: 2019-10-16

## 2019-10-16 NOTE — PHYSICAL EXAM
[General Appearance - Well Developed] : well developed [Normal Appearance] : normal appearance [Well Groomed] : well groomed [General Appearance - Well Nourished] : well nourished [No Deformities] : no deformities [General Appearance - In No Acute Distress] : no acute distress [Normal Conjunctiva] : the conjunctiva exhibited no abnormalities [Eyelids - No Xanthelasma] : the eyelids demonstrated no xanthelasmas [Normal Oral Mucosa] : normal oral mucosa [No Oral Pallor] : no oral pallor [No Oral Cyanosis] : no oral cyanosis [Normal Jugular Venous A Waves Present] : normal jugular venous A waves present [Normal Jugular Venous V Waves Present] : normal jugular venous V waves present [No Jugular Venous English A Waves] : no jugular venous english A waves [Respiration, Rhythm And Depth] : normal respiratory rhythm and effort [Exaggerated Use Of Accessory Muscles For Inspiration] : no accessory muscle use [Auscultation Breath Sounds / Voice Sounds] : lungs were clear to auscultation bilaterally [Heart Rate And Rhythm] : heart rate and rhythm were normal [Heart Sounds] : normal S1 and S2 [Murmurs] : no murmurs present [Abdomen Soft] : soft [Abdomen Tenderness] : non-tender [Abdomen Mass (___ Cm)] : no abdominal mass palpated [FreeTextEntry1] : Gait uses walker.  [Nail Clubbing] : no clubbing of the fingernails [Cyanosis, Localized] : no localized cyanosis [Petechial Hemorrhages (___cm)] : no petechial hemorrhages [Skin Color & Pigmentation] : normal skin color and pigmentation [] : no rash [No Venous Stasis] : no venous stasis [Skin Lesions] : no skin lesions [No Skin Ulcers] : no skin ulcer [No Xanthoma] : no  xanthoma was observed [Oriented To Time, Place, And Person] : oriented to person, place, and time [Affect] : the affect was normal [Mood] : the mood was normal [No Anxiety] : not feeling anxious

## 2019-10-16 NOTE — HISTORY OF PRESENT ILLNESS
[FreeTextEntry1] : Mr. Miller is an 87-year-old gentleman with history of coronary artery disease status post CABG, PVCs, RBBB who presents for followup after hospitalization for an episode of syncope. He had telemetry with no arrhythmia for 48 hours, he did not have any orthostatic BP changes. \par \par He reports he is feeling well since. Only complaint is some back pain s/p his surgery. He is now using a walker. \par \par MASHA MILLER  denies chest pain, chest pressure, shortness of breath, lightheadedness, dizziness, palpitations,  orthopnea, PND, or edema.

## 2019-10-16 NOTE — DISCUSSION/SUMMARY
[A-V Block] : A-V block [Paroxysmal V. Tachycaridia] : paroxysmal ventricular trachycardia [Sinus Bradycardia] : sinus bradycardia [Sick Sinus Syndrome] : sick sinus syndrome [Syncope of Unknown Origin] : syncope of unknown origin [Patient] : the patient [de-identified] : Implantable Loop Recorder.

## 2019-10-16 NOTE — REASON FOR VISIT
[Follow-Up - From Hospitalization] : follow-up of a recent hospitalization for [Abnormal ECG] : an abnormal ECG [Palpitations] : palpitations

## 2019-10-16 NOTE — REVIEW OF SYSTEMS
[see HPI] : see HPI [Joint Pain] : joint pain [Muscle Cramps] : muscle cramps [Limb Weakness (Paresis)] : limb weakness [Dizziness] : dizziness [Numbness (Hypesthesia)] : numbness [Tingling (Paresthesia)] : tingling [Negative] : Heme/Lymph

## 2019-10-16 NOTE — ASSESSMENT
[FreeTextEntry1] : 87-year-old gentleman, status post coronary artery bypass surgery with a history of PVCs and recently PACs. He had an episode of syncope of unclear etiology. \par recommend implant of a Implantable cardiac monitor/loop recorder for long term monitoring and detection of arrhythmia etiology for syncope. \par

## 2019-10-18 RX ORDER — GABAPENTIN 400 MG/1
1 CAPSULE ORAL
Qty: 0 | Refills: 0 | DISCHARGE

## 2019-10-18 RX ORDER — CARBIDOPA AND LEVODOPA 25; 100 MG/1; MG/1
1 TABLET ORAL
Qty: 0 | Refills: 0 | DISCHARGE

## 2019-10-18 NOTE — ASU PATIENT PROFILE, ADULT - FALLEN IN THE PAST
Positive blood cultures from 11/2/18 preliminary results, growth in anaerobic bottle gram positive cocci in clusters no

## 2019-10-21 ENCOUNTER — OUTPATIENT (OUTPATIENT)
Dept: OUTPATIENT SERVICES | Facility: HOSPITAL | Age: 84
LOS: 1 days | Discharge: HOME | End: 2019-10-21
Payer: MEDICARE

## 2019-10-21 VITALS
SYSTOLIC BLOOD PRESSURE: 129 MMHG | RESPIRATION RATE: 16 BRPM | HEART RATE: 67 BPM | OXYGEN SATURATION: 100 % | DIASTOLIC BLOOD PRESSURE: 61 MMHG

## 2019-10-21 VITALS
SYSTOLIC BLOOD PRESSURE: 136 MMHG | OXYGEN SATURATION: 100 % | TEMPERATURE: 97 F | RESPIRATION RATE: 16 BRPM | DIASTOLIC BLOOD PRESSURE: 71 MMHG | HEART RATE: 75 BPM

## 2019-10-21 DIAGNOSIS — Z41.9 ENCOUNTER FOR PROCEDURE FOR PURPOSES OTHER THAN REMEDYING HEALTH STATE, UNSPECIFIED: Chronic | ICD-10-CM

## 2019-10-21 DIAGNOSIS — Z98.49 CATARACT EXTRACTION STATUS, UNSPECIFIED EYE: Chronic | ICD-10-CM

## 2019-10-21 DIAGNOSIS — Z95.1 PRESENCE OF AORTOCORONARY BYPASS GRAFT: Chronic | ICD-10-CM

## 2019-10-21 DIAGNOSIS — I49.1 ATRIAL PREMATURE DEPOLARIZATION: ICD-10-CM

## 2019-10-21 DIAGNOSIS — Z90.49 ACQUIRED ABSENCE OF OTHER SPECIFIED PARTS OF DIGESTIVE TRACT: Chronic | ICD-10-CM

## 2019-10-21 PROCEDURE — C1764: CPT

## 2019-10-21 PROCEDURE — 33285 INSJ SUBQ CAR RHYTHM MNTR: CPT

## 2019-10-21 NOTE — PACU DISCHARGE NOTE - COMMENTS
VSS AXOX4, denies pain or discomfort. tolerating Po fluids. IV dc'd.  Left chest dsg Dermabond/ steri strips CDI, no s/s of bleeding or hematoma. Home monitor paired/ education or use by [facility], understanding noted through teach back.  Home monitor/ temp ID card sent home with pt.  Report/ telemetry confirmed given to * RN. Home health aide to accompany pt to lobby for discharge.

## 2019-10-21 NOTE — PROCEDURAL SAFETY CHECKLIST WITH OR WITHOUT SEDATION - NSPOSTCOMMENTFT_GEN_ALL_CORE
VSS, denies pain or discomfort from surgical site.  left chest dsg CDI with derma bond/steri strips. [employer] / Edin Montez paired loop recorder and home monitoring given to pt. Pt education done, understanding noted through teach back..

## 2019-10-22 DIAGNOSIS — K21.9 GASTRO-ESOPHAGEAL REFLUX DISEASE WITHOUT ESOPHAGITIS: ICD-10-CM

## 2019-10-22 DIAGNOSIS — M54.16 RADICULOPATHY, LUMBAR REGION: ICD-10-CM

## 2019-10-22 DIAGNOSIS — M21.372 FOOT DROP, LEFT FOOT: ICD-10-CM

## 2019-10-22 DIAGNOSIS — R55 SYNCOPE AND COLLAPSE: ICD-10-CM

## 2019-10-22 DIAGNOSIS — Z95.1 PRESENCE OF AORTOCORONARY BYPASS GRAFT: ICD-10-CM

## 2019-10-22 DIAGNOSIS — Z95.5 PRESENCE OF CORONARY ANGIOPLASTY IMPLANT AND GRAFT: ICD-10-CM

## 2019-10-22 DIAGNOSIS — I10 ESSENTIAL (PRIMARY) HYPERTENSION: ICD-10-CM

## 2019-10-22 DIAGNOSIS — I45.10 UNSPECIFIED RIGHT BUNDLE-BRANCH BLOCK: ICD-10-CM

## 2019-10-22 DIAGNOSIS — E78.5 HYPERLIPIDEMIA, UNSPECIFIED: ICD-10-CM

## 2019-10-22 DIAGNOSIS — Z79.891 LONG TERM (CURRENT) USE OF OPIATE ANALGESIC: ICD-10-CM

## 2019-10-22 DIAGNOSIS — N40.0 BENIGN PROSTATIC HYPERPLASIA WITHOUT LOWER URINARY TRACT SYMPTOMS: ICD-10-CM

## 2019-10-22 DIAGNOSIS — Z85.820 PERSONAL HISTORY OF MALIGNANT MELANOMA OF SKIN: ICD-10-CM

## 2019-11-05 ENCOUNTER — APPOINTMENT (OUTPATIENT)
Dept: ELECTROPHYSIOLOGY | Facility: CLINIC | Age: 84
End: 2019-11-05

## 2019-11-06 ENCOUNTER — APPOINTMENT (OUTPATIENT)
Dept: ELECTROPHYSIOLOGY | Facility: CLINIC | Age: 84
End: 2019-11-06
Payer: MEDICARE

## 2019-11-06 VITALS
DIASTOLIC BLOOD PRESSURE: 77 MMHG | OXYGEN SATURATION: 100 % | SYSTOLIC BLOOD PRESSURE: 130 MMHG | WEIGHT: 170 LBS | BODY MASS INDEX: 25.18 KG/M2 | HEIGHT: 69 IN | HEART RATE: 61 BPM

## 2019-11-06 PROCEDURE — 93285 PRGRMG DEV EVAL SCRMS IP: CPT

## 2019-12-09 ENCOUNTER — APPOINTMENT (OUTPATIENT)
Dept: ELECTROPHYSIOLOGY | Facility: CLINIC | Age: 84
End: 2019-12-09
Payer: MEDICARE

## 2019-12-09 PROCEDURE — 93299: CPT

## 2019-12-09 PROCEDURE — 93298 REM INTERROG DEV EVAL SCRMS: CPT

## 2020-01-09 ENCOUNTER — APPOINTMENT (OUTPATIENT)
Dept: ELECTROPHYSIOLOGY | Facility: CLINIC | Age: 85
End: 2020-01-09
Payer: MEDICARE

## 2020-01-09 PROCEDURE — G2066: CPT

## 2020-01-09 PROCEDURE — 93298 REM INTERROG DEV EVAL SCRMS: CPT

## 2020-01-10 ENCOUNTER — TRANSCRIPTION ENCOUNTER (OUTPATIENT)
Age: 85
End: 2020-01-10

## 2020-02-10 ENCOUNTER — APPOINTMENT (OUTPATIENT)
Dept: ELECTROPHYSIOLOGY | Facility: CLINIC | Age: 85
End: 2020-02-10
Payer: MEDICARE

## 2020-02-10 PROCEDURE — G2066: CPT

## 2020-02-10 PROCEDURE — 93298 REM INTERROG DEV EVAL SCRMS: CPT

## 2020-03-12 ENCOUNTER — APPOINTMENT (OUTPATIENT)
Dept: ELECTROPHYSIOLOGY | Facility: CLINIC | Age: 85
End: 2020-03-12
Payer: MEDICARE

## 2020-03-12 PROCEDURE — 93298 REM INTERROG DEV EVAL SCRMS: CPT

## 2020-03-12 PROCEDURE — G2066: CPT

## 2020-03-17 NOTE — ED ADULT TRIAGE NOTE - HEIGHT IN INCHES
Cataract symptoms i.e., glare, blur discussed. Pt to call if worsening vision or trouble with driving, TV, reading, ADL. UV precautions. Reviewed possibility of future cataract surgery.
Glasses Prescription given to patient.
Patient educated on condition.
9

## 2020-04-13 ENCOUNTER — APPOINTMENT (OUTPATIENT)
Dept: ELECTROPHYSIOLOGY | Facility: CLINIC | Age: 85
End: 2020-04-13
Payer: MEDICARE

## 2020-04-13 PROCEDURE — G2066: CPT

## 2020-04-13 PROCEDURE — 93298 REM INTERROG DEV EVAL SCRMS: CPT

## 2020-05-14 ENCOUNTER — APPOINTMENT (OUTPATIENT)
Dept: ELECTROPHYSIOLOGY | Facility: CLINIC | Age: 85
End: 2020-05-14
Payer: MEDICARE

## 2020-05-14 PROCEDURE — 93298 REM INTERROG DEV EVAL SCRMS: CPT

## 2020-05-14 PROCEDURE — G2066: CPT

## 2020-05-20 NOTE — ED ADULT NURSE NOTE - CAS DISCH TRANSFER METHOD
This patient was last seen on 11/21/2019.  At that time I told her to follow-up in 3 months and bring along her blood sugar record.  Patient did have an appointment scheduled with me on 04/30/2020.  That appointment got canceled because of the COVID-19 pandemic.  Patient was told on April 23rd that she should schedule with a new PCP in early June and that we would give her refills of her regular medications to last until then if needed.    On April 23rd patient did call asking about her knee pain and if I could do a cortisone injection.  My office informed her that I do not do those and that she should see Orthopedics.  We offered to refer to orthopedics but she was unsure if she wanted the referral or not and so we did not place 1. Patient then went to the ER on May 3rd and was given tramadol.  Patient previously has used [facility] orthopedic physicians.    Patient also needs to establish a new PCP because of being on warfarin and having INR monitoring through the “[facility].”  They have previously informed her that she needs to have a new PCP established by June 1st.  Patient had planned on establishing with a new PCP at the [facility] per previous telephone encounter.    Please call the patient.  Please tell her I did refill furosemide for 1 month.  I refilled her nystatin.  I refilled Lantus insulin.  Please tell the patient there was a request for tramadol.  I have not prescribed that for her.  Review of the record reveals that was prescribed by the ER physician on May 3 for her chronic R knee pain.  Please find out how her right knee is doing.  Find out how often she takes tramadol.  If she still has pain that requires tramadol she definitely needs to see Orthopedics.  She can do that through her previous orthopedic physician at [facility] or we can refer her to our orthopedic department in [place].  Please find out if she wants a referral to orthopedics in [place] or not.  If patient is having  warmth or redness of her knee she should see orthopedics this week or early next week.  If necessary I can refill tramadol, but only for 20 tablets and she definitely needs to see Orthopedics.  Please let me know what she says.    Please remind the patient that she needs to establish a new PCP by June 1st.  That means she should call today to get an appointment with her new PCP.  The [facility] requires her to have a PCP in order to continue INR monitoring.  Please find out if she is going to do that at the [facility].   Private car

## 2020-06-04 NOTE — PATIENT PROFILE ADULT - NSPROGENOTHERPROVIDER_GEN_A_NUR
none Detail Level: Zone Additional Notes: Patient consent was obtained to proceed with the visit and recommended plan of care after discussion of all risks and benefits, including the risks of COVID-19 exposure.

## 2020-06-11 RX ORDER — OXYCODONE 10 MG/1
10 TABLET ORAL
Refills: 0 | Status: DISCONTINUED | COMMUNITY
End: 2020-06-11

## 2020-06-11 RX ORDER — LORAZEPAM 1 MG/1
1 TABLET ORAL
Refills: 0 | Status: DISCONTINUED | COMMUNITY
Start: 2017-04-15 | End: 2020-06-11

## 2020-06-11 RX ORDER — CARBIDOPA AND LEVODOPA 25; 100 MG/1; MG/1
25-100 TABLET ORAL 3 TIMES DAILY
Refills: 0 | Status: ACTIVE | COMMUNITY
Start: 2020-06-11

## 2020-06-11 RX ORDER — GABAPENTIN 300 MG/1
300 CAPSULE ORAL 3 TIMES DAILY
Refills: 0 | Status: ACTIVE | COMMUNITY
Start: 2020-06-11

## 2020-06-15 ENCOUNTER — APPOINTMENT (OUTPATIENT)
Dept: ELECTROPHYSIOLOGY | Facility: CLINIC | Age: 85
End: 2020-06-15
Payer: MEDICARE

## 2020-06-15 VITALS
OXYGEN SATURATION: 100 % | BODY MASS INDEX: 25.18 KG/M2 | SYSTOLIC BLOOD PRESSURE: 166 MMHG | WEIGHT: 170 LBS | HEIGHT: 69 IN | TEMPERATURE: 97.8 F | HEART RATE: 64 BPM | DIASTOLIC BLOOD PRESSURE: 93 MMHG

## 2020-06-15 DIAGNOSIS — Z95.818 PRESENCE OF OTHER CARDIAC IMPLANTS AND GRAFTS: ICD-10-CM

## 2020-06-15 DIAGNOSIS — R55 SYNCOPE AND COLLAPSE: ICD-10-CM

## 2020-06-15 PROCEDURE — 99213 OFFICE O/P EST LOW 20 MIN: CPT

## 2020-06-15 PROCEDURE — 93285 PRGRMG DEV EVAL SCRMS IP: CPT

## 2020-06-15 NOTE — PHYSICAL EXAM
[Normal Appearance] : normal appearance [General Appearance - Well Developed] : well developed [No Deformities] : no deformities [General Appearance - Well Nourished] : well nourished [Well Groomed] : well groomed [General Appearance - In No Acute Distress] : no acute distress [Heart Rate And Rhythm] : heart rate and rhythm were normal [Heart Sounds] : normal S1 and S2 [Murmurs] : no murmurs present [Respiration, Rhythm And Depth] : normal respiratory rhythm and effort [Exaggerated Use Of Accessory Muscles For Inspiration] : no accessory muscle use [Clean] : clean [Auscultation Breath Sounds / Voice Sounds] : lungs were clear to auscultation bilaterally [Dry] : dry [Abdomen Soft] : soft [Well-Healed] : well-healed [Abdomen Tenderness] : non-tender [Abdomen Mass (___ Cm)] : no abdominal mass palpated [Nail Clubbing] : no clubbing of the fingernails [Cyanosis, Localized] : no localized cyanosis [Petechial Hemorrhages (___cm)] : no petechial hemorrhages [] : no ischemic changes

## 2020-06-15 NOTE — ASSESSMENT
[FreeTextEntry1] : 89 yo male presented for routine LINQ interrogation, it shows sinus rhythm without any episodes of tachy or bradyarrhythmias in the last 6 months.  will continue remote follow up monthly, and office interrogation in 6months.

## 2020-06-15 NOTE — HISTORY OF PRESENT ILLNESS
[de-identified] : 87 yo male with h/o CAD s/p CABG, PVCs, RBBB, with LINQ implant for evaluation of syncope. \par he denies any chest pain, sob, dizziness, or palpitations.  [None] : The patient complains of no symptoms

## 2020-07-16 ENCOUNTER — APPOINTMENT (OUTPATIENT)
Dept: ELECTROPHYSIOLOGY | Facility: CLINIC | Age: 85
End: 2020-07-16
Payer: MEDICARE

## 2020-07-16 PROCEDURE — 93298 REM INTERROG DEV EVAL SCRMS: CPT

## 2020-07-16 PROCEDURE — G2066: CPT

## 2020-08-19 ENCOUNTER — APPOINTMENT (OUTPATIENT)
Dept: ELECTROPHYSIOLOGY | Facility: CLINIC | Age: 85
End: 2020-08-19
Payer: MEDICARE

## 2020-08-20 PROCEDURE — 93298 REM INTERROG DEV EVAL SCRMS: CPT

## 2020-08-20 PROCEDURE — G2066: CPT

## 2020-09-23 ENCOUNTER — APPOINTMENT (OUTPATIENT)
Dept: ELECTROPHYSIOLOGY | Facility: CLINIC | Age: 85
End: 2020-09-23
Payer: MEDICARE

## 2020-09-24 PROCEDURE — 93298 REM INTERROG DEV EVAL SCRMS: CPT

## 2020-09-24 PROCEDURE — G2066: CPT

## 2020-10-28 ENCOUNTER — APPOINTMENT (OUTPATIENT)
Dept: ELECTROPHYSIOLOGY | Facility: CLINIC | Age: 85
End: 2020-10-28
Payer: MEDICARE

## 2020-10-29 PROCEDURE — 93298 REM INTERROG DEV EVAL SCRMS: CPT

## 2020-10-29 PROCEDURE — G2066: CPT

## 2020-12-03 ENCOUNTER — APPOINTMENT (OUTPATIENT)
Dept: ELECTROPHYSIOLOGY | Facility: CLINIC | Age: 85
End: 2020-12-03

## 2020-12-06 ENCOUNTER — APPOINTMENT (OUTPATIENT)
Dept: ELECTROPHYSIOLOGY | Facility: CLINIC | Age: 85
End: 2020-12-06
Payer: MEDICARE

## 2020-12-07 PROCEDURE — G2066: CPT

## 2020-12-07 PROCEDURE — 93298 REM INTERROG DEV EVAL SCRMS: CPT

## 2020-12-08 ENCOUNTER — APPOINTMENT (OUTPATIENT)
Dept: DERMATOLOGY | Facility: CLINIC | Age: 85
End: 2020-12-08

## 2020-12-14 ENCOUNTER — APPOINTMENT (OUTPATIENT)
Dept: ELECTROPHYSIOLOGY | Facility: CLINIC | Age: 85
End: 2020-12-14

## 2020-12-28 NOTE — ED PROVIDER NOTE - LOCATION
Transition of Care-patient is a resident at [facility] [place], call placed to [facility] Sabine LLOYDN, RN  [facility]   [phone] elbow

## 2021-01-10 ENCOUNTER — APPOINTMENT (OUTPATIENT)
Dept: ELECTROPHYSIOLOGY | Facility: CLINIC | Age: 86
End: 2021-01-10
Payer: MEDICARE

## 2021-01-11 PROCEDURE — G2066: CPT

## 2021-01-11 PROCEDURE — 93298 REM INTERROG DEV EVAL SCRMS: CPT

## 2021-02-17 ENCOUNTER — APPOINTMENT (OUTPATIENT)
Dept: ELECTROPHYSIOLOGY | Facility: CLINIC | Age: 86
End: 2021-02-17
Payer: MEDICARE

## 2021-02-17 PROCEDURE — G2066: CPT

## 2021-02-17 PROCEDURE — 93298 REM INTERROG DEV EVAL SCRMS: CPT

## 2021-02-25 ENCOUNTER — APPOINTMENT (OUTPATIENT)
Dept: DERMATOLOGY | Facility: CLINIC | Age: 86
End: 2021-02-25
Payer: MEDICARE

## 2021-02-25 PROCEDURE — 11102 TANGNTL BX SKIN SINGLE LES: CPT

## 2021-02-25 PROCEDURE — 99213 OFFICE O/P EST LOW 20 MIN: CPT | Mod: 25

## 2021-02-25 PROCEDURE — 11103 TANGNTL BX SKIN EA SEP/ADDL: CPT

## 2021-02-25 PROCEDURE — 17000 DESTRUCT PREMALG LESION: CPT | Mod: 59

## 2021-02-25 NOTE — HISTORY OF PRESENT ILLNESS
[FreeTextEntry1] : Fit in for lesion of the right neck. [de-identified] : Notes growth and irritation.  No bleeding.

## 2021-02-25 NOTE — ASSESSMENT
[FreeTextEntry1] : Lentigines\par education.\par Hats and sunscreens.\par \par R/O BCC - left alar crease.  Plan D&C if positive.\par R/O SCC of the right posterior auricular region.  Plan excision if positive.

## 2021-03-03 LAB — CORE LAB BIOPSY: NORMAL

## 2021-03-15 ENCOUNTER — APPOINTMENT (OUTPATIENT)
Dept: DERMATOLOGY | Facility: CLINIC | Age: 86
End: 2021-03-15

## 2021-03-21 ENCOUNTER — APPOINTMENT (OUTPATIENT)
Dept: ELECTROPHYSIOLOGY | Facility: CLINIC | Age: 86
End: 2021-03-21
Payer: MEDICARE

## 2021-03-22 ENCOUNTER — APPOINTMENT (OUTPATIENT)
Dept: DERMATOLOGY | Facility: CLINIC | Age: 86
End: 2021-03-22
Payer: MEDICARE

## 2021-03-22 ENCOUNTER — NON-APPOINTMENT (OUTPATIENT)
Age: 86
End: 2021-03-22

## 2021-03-22 DIAGNOSIS — C44.42 SQUAMOUS CELL CARCINOMA OF SKIN OF SCALP AND NECK: ICD-10-CM

## 2021-03-22 PROCEDURE — 12042 INTMD RPR N-HF/GENIT2.6-7.5: CPT

## 2021-03-22 PROCEDURE — G2066: CPT

## 2021-03-22 PROCEDURE — 93298 REM INTERROG DEV EVAL SCRMS: CPT

## 2021-03-22 PROCEDURE — 11622 EXC S/N/H/F/G MAL+MRG 1.1-2: CPT

## 2021-03-22 RX ORDER — TESTOSTERONE 12.5 MG/1
12.5 MG/ACT GEL, METERED TOPICAL
Qty: 450 | Refills: 0 | Status: ACTIVE | COMMUNITY
Start: 2021-02-11

## 2021-03-22 RX ORDER — TESTOSTERONE ENANTHATE 200 MG/ML
200 INJECTION, SOLUTION INTRAMUSCULAR
Qty: 5 | Refills: 0 | Status: DISCONTINUED | COMMUNITY
Start: 2020-11-28

## 2021-03-22 RX ORDER — LEVETIRACETAM 750 MG/1
750 TABLET, EXTENDED RELEASE ORAL
Qty: 90 | Refills: 0 | Status: ACTIVE | COMMUNITY
Start: 2021-03-02

## 2021-03-22 RX ORDER — LEVETIRACETAM 750 MG/1
750 TABLET, FILM COATED ORAL TWICE DAILY
Refills: 0 | Status: COMPLETED | COMMUNITY
Start: 2020-06-11 | End: 2021-03-22

## 2021-03-29 LAB — CORE LAB BIOPSY: NORMAL

## 2021-04-09 ENCOUNTER — APPOINTMENT (OUTPATIENT)
Dept: DERMATOLOGY | Facility: CLINIC | Age: 86
End: 2021-04-09
Payer: MEDICARE

## 2021-04-09 DIAGNOSIS — C44.310 BASAL CELL CARCINOMA OF SKIN OF UNSPECIFIED PARTS OF FACE: ICD-10-CM

## 2021-04-09 DIAGNOSIS — Z85.828 PERSONAL HISTORY OF OTHER MALIGNANT NEOPLASM OF SKIN: ICD-10-CM

## 2021-04-09 DIAGNOSIS — Z85.89 PERSONAL HISTORY OF MALIGNANT NEOPLASM OF OTHER ORGANS AND SYSTEMS: ICD-10-CM

## 2021-04-09 PROCEDURE — 17282 DSTR MAL LS F/E/E/N/L/M1.1-2: CPT

## 2021-04-23 ENCOUNTER — APPOINTMENT (OUTPATIENT)
Dept: ELECTROPHYSIOLOGY | Facility: CLINIC | Age: 86
End: 2021-04-23
Payer: MEDICARE

## 2021-04-23 ENCOUNTER — NON-APPOINTMENT (OUTPATIENT)
Age: 86
End: 2021-04-23

## 2021-04-23 PROCEDURE — G2066: CPT

## 2021-04-23 PROCEDURE — 93298 REM INTERROG DEV EVAL SCRMS: CPT

## 2021-05-20 ENCOUNTER — APPOINTMENT (OUTPATIENT)
Dept: DERMATOLOGY | Facility: CLINIC | Age: 86
End: 2021-05-20
Payer: MEDICARE

## 2021-05-20 DIAGNOSIS — L30.9 DERMATITIS, UNSPECIFIED: ICD-10-CM

## 2021-05-20 DIAGNOSIS — Z85.828 PERSONAL HISTORY OF OTHER MALIGNANT NEOPLASM OF SKIN: ICD-10-CM

## 2021-05-20 PROCEDURE — 99213 OFFICE O/P EST LOW 20 MIN: CPT | Mod: 25

## 2021-05-20 PROCEDURE — 17003 DESTRUCT PREMALG LES 2-14: CPT | Mod: 59

## 2021-05-20 PROCEDURE — 17000 DESTRUCT PREMALG LESION: CPT | Mod: 59

## 2021-05-20 RX ORDER — BETAMETHASONE DIPROPIONATE 0.5 MG/G
0.05 OINTMENT, AUGMENTED TOPICAL
Qty: 1 | Refills: 1 | Status: ACTIVE | COMMUNITY
Start: 2021-05-20 | End: 1900-01-01

## 2021-05-20 RX ORDER — FEXOFENADINE HYDROCHLORIDE 180 MG/1
180 TABLET ORAL
Qty: 2 | Refills: 0 | Status: ACTIVE | COMMUNITY
Start: 2021-05-20 | End: 1900-01-01

## 2021-05-23 ENCOUNTER — APPOINTMENT (OUTPATIENT)
Dept: ELECTROPHYSIOLOGY | Facility: CLINIC | Age: 86
End: 2021-05-23
Payer: MEDICARE

## 2021-05-24 ENCOUNTER — NON-APPOINTMENT (OUTPATIENT)
Age: 86
End: 2021-05-24

## 2021-05-24 PROCEDURE — 93298 REM INTERROG DEV EVAL SCRMS: CPT

## 2021-05-24 PROCEDURE — G2066: CPT

## 2021-06-29 ENCOUNTER — NON-APPOINTMENT (OUTPATIENT)
Age: 86
End: 2021-06-29

## 2021-06-29 ENCOUNTER — APPOINTMENT (OUTPATIENT)
Dept: ELECTROPHYSIOLOGY | Facility: CLINIC | Age: 86
End: 2021-06-29
Payer: MEDICARE

## 2021-06-29 PROCEDURE — G2066: CPT

## 2021-06-29 PROCEDURE — 93298 REM INTERROG DEV EVAL SCRMS: CPT

## 2021-07-29 ENCOUNTER — APPOINTMENT (OUTPATIENT)
Dept: UROLOGY | Facility: CLINIC | Age: 86
End: 2021-07-29

## 2021-08-01 ENCOUNTER — APPOINTMENT (OUTPATIENT)
Dept: ELECTROPHYSIOLOGY | Facility: CLINIC | Age: 86
End: 2021-08-01
Payer: MEDICARE

## 2021-08-02 ENCOUNTER — NON-APPOINTMENT (OUTPATIENT)
Age: 86
End: 2021-08-02

## 2021-08-02 PROCEDURE — 93298 REM INTERROG DEV EVAL SCRMS: CPT

## 2021-08-02 PROCEDURE — G2066: CPT

## 2021-08-03 ENCOUNTER — APPOINTMENT (OUTPATIENT)
Dept: UROLOGY | Facility: CLINIC | Age: 86
End: 2021-08-03
Payer: MEDICARE

## 2021-08-03 PROCEDURE — 99213 OFFICE O/P EST LOW 20 MIN: CPT

## 2021-08-03 NOTE — PHYSICAL EXAM
[General Appearance - Well Developed] : well developed [General Appearance - Well Nourished] : well nourished [Normal Appearance] : normal appearance [Well Groomed] : well groomed [General Appearance - In No Acute Distress] : no acute distress [Abdomen Soft] : soft [Abdomen Tenderness] : non-tender [Costovertebral Angle Tenderness] : no ~M costovertebral angle tenderness [Urethral Meatus] : meatus normal [Urinary Bladder Findings] : the bladder was normal on palpation [Scrotum] : the scrotum was normal [Testes Mass (___cm)] : there were no testicular masses [No Prostate Nodules] : no prostate nodules [FreeTextEntry1] : Prostate small and palpably benign with mild assyemtry R>L [Edema] : no peripheral edema [] : no respiratory distress [Respiration, Rhythm And Depth] : normal respiratory rhythm and effort [Exaggerated Use Of Accessory Muscles For Inspiration] : no accessory muscle use [Oriented To Time, Place, And Person] : oriented to person, place, and time [Affect] : the affect was normal [Mood] : the mood was normal [Not Anxious] : not anxious [Normal Station and Gait] : the gait and station were normal for the patient's age [No Focal Deficits] : no focal deficits [No Palpable Adenopathy] : no palpable adenopathy

## 2021-08-03 NOTE — HISTORY OF PRESENT ILLNESS
[FreeTextEntry1] : This patient is here for a checkup and seems to do very well.  He notes some daytime frequency but has no nocturia whatsoever.  His labs are fine

## 2021-08-11 ENCOUNTER — APPOINTMENT (OUTPATIENT)
Dept: DERMATOLOGY | Facility: CLINIC | Age: 86
End: 2021-08-11
Payer: MEDICARE

## 2021-08-11 DIAGNOSIS — D22.9 MELANOCYTIC NEVI, UNSPECIFIED: ICD-10-CM

## 2021-08-11 DIAGNOSIS — L57.0 ACTINIC KERATOSIS: ICD-10-CM

## 2021-08-11 DIAGNOSIS — Z00.00 ENCOUNTER FOR GENERAL ADULT MEDICAL EXAMINATION W/OUT ABNORMAL FINDINGS: ICD-10-CM

## 2021-08-11 PROCEDURE — 17000 DESTRUCT PREMALG LESION: CPT

## 2021-08-11 PROCEDURE — 99213 OFFICE O/P EST LOW 20 MIN: CPT | Mod: 25

## 2021-08-11 PROCEDURE — 17003 DESTRUCT PREMALG LES 2-14: CPT

## 2021-08-11 NOTE — HISTORY OF PRESENT ILLNESS
[FreeTextEntry1] : Patient presents for skin examination. [de-identified] : Note rough lesions of the forearms.

## 2021-08-11 NOTE — PHYSICAL EXAM
[Alert] : alert [Oriented x 3] : ~L oriented x 3 [Well Nourished] : well nourished [Declined] : declined [FreeTextEntry3] : A skin exam was performed from the belt up including the scalp, face (including the lips, ears, nose and eyes), neck, chest, abdomen, back and upper extremities.  The patient declined examination below the belt. \par The exam revealed the following benign growths:\par [place] pigmented nevi.\par Lentigines.\par \par Keratotic papules, left forearm x 2, right dorsal hand, right forearm x 2.

## 2021-09-08 ENCOUNTER — APPOINTMENT (OUTPATIENT)
Dept: ELECTROPHYSIOLOGY | Facility: CLINIC | Age: 86
End: 2021-09-08
Payer: MEDICARE

## 2021-09-09 ENCOUNTER — NON-APPOINTMENT (OUTPATIENT)
Age: 86
End: 2021-09-09

## 2021-09-09 PROCEDURE — 93298 REM INTERROG DEV EVAL SCRMS: CPT

## 2021-09-09 PROCEDURE — G2066: CPT

## 2021-10-14 ENCOUNTER — NON-APPOINTMENT (OUTPATIENT)
Age: 86
End: 2021-10-14

## 2021-10-14 ENCOUNTER — APPOINTMENT (OUTPATIENT)
Dept: ELECTROPHYSIOLOGY | Facility: CLINIC | Age: 86
End: 2021-10-14
Payer: MEDICARE

## 2021-10-14 PROCEDURE — 93298 REM INTERROG DEV EVAL SCRMS: CPT

## 2021-10-14 PROCEDURE — G2066: CPT

## 2021-11-17 ENCOUNTER — APPOINTMENT (OUTPATIENT)
Dept: ELECTROPHYSIOLOGY | Facility: CLINIC | Age: 86
End: 2021-11-17
Payer: MEDICARE

## 2021-11-17 ENCOUNTER — NON-APPOINTMENT (OUTPATIENT)
Age: 86
End: 2021-11-17

## 2021-11-17 PROCEDURE — G2066: CPT | Mod: NC

## 2021-11-17 PROCEDURE — 93298 REM INTERROG DEV EVAL SCRMS: CPT

## 2021-11-22 ENCOUNTER — APPOINTMENT (OUTPATIENT)
Dept: DERMATOLOGY | Facility: CLINIC | Age: 86
End: 2021-11-22
Payer: MEDICARE

## 2021-11-22 PROCEDURE — 99213 OFFICE O/P EST LOW 20 MIN: CPT | Mod: 25

## 2021-11-22 PROCEDURE — 11102 TANGNTL BX SKIN SINGLE LES: CPT

## 2021-11-22 RX ORDER — NEOMYCIN AND POLYMYXIN B SULFATES AND DEXAMETHASONE 3.5; 10000; 1 MG/G; [IU]/G; MG/G
3.5-10000-0.1 OINTMENT OPHTHALMIC
Qty: 4 | Refills: 0 | Status: ACTIVE | COMMUNITY
Start: 2021-09-21

## 2021-11-22 RX ORDER — LIDOCAINE 5% 700 MG/1
5 PATCH TOPICAL
Qty: 10 | Refills: 0 | Status: ACTIVE | COMMUNITY
Start: 2021-10-25

## 2021-11-22 RX ORDER — OXYCODONE AND ACETAMINOPHEN 10; 325 MG/1; MG/1
10-325 TABLET ORAL
Qty: 12 | Refills: 0 | Status: ACTIVE | COMMUNITY
Start: 2021-10-06

## 2021-11-22 RX ORDER — DIAZEPAM 5 MG/1
5 TABLET ORAL
Qty: 15 | Refills: 0 | Status: ACTIVE | COMMUNITY
Start: 2021-10-22

## 2021-11-22 RX ORDER — OXYCODONE AND ACETAMINOPHEN 5; 325 MG/1; MG/1
5-325 TABLET ORAL
Qty: 15 | Refills: 0 | Status: ACTIVE | COMMUNITY
Start: 2021-10-22

## 2021-11-22 RX ORDER — ROSUVASTATIN CALCIUM 10 MG/1
10 TABLET, FILM COATED ORAL
Qty: 90 | Refills: 0 | Status: ACTIVE | COMMUNITY
Start: 2021-04-23

## 2021-11-22 RX ORDER — AMOXICILLIN 875 MG/1
875 TABLET, FILM COATED ORAL
Qty: 10 | Refills: 0 | Status: ACTIVE | COMMUNITY
Start: 2021-10-06

## 2021-11-22 NOTE — HISTORY OF PRESENT ILLNESS
[FreeTextEntry1] : Fit in for lesion of the left neck region. [de-identified] : Growing lesion present, for months.  No bleeding, or tenderness.

## 2021-11-22 NOTE — ASSESSMENT
[FreeTextEntry1] : Lentigines\par Hats and sunscreens.\par \par R/O KA vs. SCC, left angle of the mandible.\par Plan excision if positive.

## 2021-11-22 NOTE — PHYSICAL EXAM
[FreeTextEntry3] : Indurated umbilcated papule, inferior to the left angle of the mandible.\par \par lentigines, diffusely on the face.

## 2021-11-30 LAB — CORE LAB BIOPSY: NORMAL
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
## 2021-12-21 ENCOUNTER — NON-APPOINTMENT (OUTPATIENT)
Age: 86
End: 2021-12-21

## 2021-12-21 ENCOUNTER — APPOINTMENT (OUTPATIENT)
Dept: ELECTROPHYSIOLOGY | Facility: CLINIC | Age: 86
End: 2021-12-21
Payer: MEDICARE

## 2021-12-21 PROCEDURE — 93298 REM INTERROG DEV EVAL SCRMS: CPT

## 2021-12-21 PROCEDURE — G2066: CPT

## 2021-12-23 ENCOUNTER — APPOINTMENT (OUTPATIENT)
Dept: OTOLARYNGOLOGY | Facility: CLINIC | Age: 86
End: 2021-12-23
Payer: MEDICARE

## 2021-12-23 VITALS — HEIGHT: 69 IN | BODY MASS INDEX: 25.92 KG/M2 | TEMPERATURE: 97.8 F | WEIGHT: 175 LBS

## 2021-12-23 DIAGNOSIS — H69.83 OTHER SPECIFIED DISORDERS OF EUSTACHIAN TUBE, BILATERAL: ICD-10-CM

## 2021-12-23 DIAGNOSIS — H61.23 IMPACTED CERUMEN, BILATERAL: ICD-10-CM

## 2021-12-23 DIAGNOSIS — J30.0 VASOMOTOR RHINITIS: ICD-10-CM

## 2021-12-23 DIAGNOSIS — H90.3 SENSORINEURAL HEARING LOSS, BILATERAL: ICD-10-CM

## 2021-12-23 PROCEDURE — 92557 COMPREHENSIVE HEARING TEST: CPT

## 2021-12-23 PROCEDURE — 99203 OFFICE O/P NEW LOW 30 MIN: CPT | Mod: 25

## 2021-12-23 PROCEDURE — G0268 REMOVAL OF IMPACTED WAX MD: CPT

## 2021-12-23 PROCEDURE — 92567 TYMPANOMETRY: CPT

## 2021-12-23 RX ORDER — IPRATROPIUM BROMIDE 42 UG/1
0.06 SPRAY NASAL 3 TIMES DAILY
Qty: 1 | Refills: 5 | Status: ACTIVE | COMMUNITY
Start: 2021-12-23 | End: 1900-01-01

## 2021-12-23 NOTE — PHYSICAL EXAM
[Normal] : mucosa is normal [Midline] : trachea located in midline position [de-identified] : ayde

## 2021-12-23 NOTE — ASSESSMENT
[FreeTextEntry1] : cerumen cleared au\par vasomotor rhinitis\par rec trial ipratropium spray\par audio moderate au sn loss fair disc\par rec hearing aid eval

## 2021-12-23 NOTE — HISTORY OF PRESENT ILLNESS
[de-identified] : co gradual hearing loss worse on left\par no tinnitus\par neg pmh re ears\par co pnd using flonase not working well

## 2021-12-23 NOTE — DATA REVIEWED
[de-identified] : Moderate to profound SNHL,  right ear\par Moderately-severe to severe SNHL, left ear\par Type A  tymp right ear\par Type As tymp left ear\par REC HAE

## 2022-01-04 ENCOUNTER — APPOINTMENT (OUTPATIENT)
Dept: DERMATOLOGY | Facility: CLINIC | Age: 87
End: 2022-01-04
Payer: MEDICARE

## 2022-01-04 DIAGNOSIS — C44.320 SQUAMOUS CELL CARCINOMA OF SKIN OF UNSPECIFIED PARTS OF FACE: ICD-10-CM

## 2022-01-04 PROCEDURE — 13132 CMPLX RPR F/C/C/M/N/AX/G/H/F: CPT

## 2022-01-04 PROCEDURE — 11642 EXC F/E/E/N/L MAL+MRG 1.1-2: CPT

## 2022-01-13 ENCOUNTER — APPOINTMENT (OUTPATIENT)
Dept: PHARMACY | Facility: CLINIC | Age: 87
End: 2022-01-13
Payer: SELF-PAY

## 2022-01-13 ENCOUNTER — APPOINTMENT (OUTPATIENT)
Dept: DERMATOLOGY | Facility: CLINIC | Age: 87
End: 2022-01-13

## 2022-01-13 PROCEDURE — V5010 ASSESSMENT FOR HEARING AID: CPT

## 2022-01-14 ENCOUNTER — APPOINTMENT (OUTPATIENT)
Dept: DERMATOLOGY | Facility: CLINIC | Age: 87
End: 2022-01-14
Payer: MEDICARE

## 2022-01-14 PROCEDURE — 99024 POSTOP FOLLOW-UP VISIT: CPT | Mod: NC

## 2022-01-24 ENCOUNTER — APPOINTMENT (OUTPATIENT)
Dept: ELECTROPHYSIOLOGY | Facility: CLINIC | Age: 87
End: 2022-01-24
Payer: MEDICARE

## 2022-01-25 ENCOUNTER — NON-APPOINTMENT (OUTPATIENT)
Age: 87
End: 2022-01-25

## 2022-01-25 PROCEDURE — G2066: CPT

## 2022-01-25 PROCEDURE — 93298 REM INTERROG DEV EVAL SCRMS: CPT

## 2022-02-03 ENCOUNTER — APPOINTMENT (OUTPATIENT)
Dept: PHARMACY | Facility: CLINIC | Age: 87
End: 2022-02-03
Payer: SELF-PAY

## 2022-02-03 PROCEDURE — V5259F: CUSTOM

## 2022-02-14 ENCOUNTER — APPOINTMENT (OUTPATIENT)
Dept: DERMATOLOGY | Facility: CLINIC | Age: 87
End: 2022-02-14

## 2022-02-22 ENCOUNTER — APPOINTMENT (OUTPATIENT)
Dept: PHARMACY | Facility: CLINIC | Age: 87
End: 2022-02-22

## 2022-02-28 ENCOUNTER — APPOINTMENT (OUTPATIENT)
Dept: ELECTROPHYSIOLOGY | Facility: CLINIC | Age: 87
End: 2022-02-28
Payer: MEDICARE

## 2022-02-28 ENCOUNTER — APPOINTMENT (OUTPATIENT)
Dept: PHARMACY | Facility: CLINIC | Age: 87
End: 2022-02-28
Payer: SELF-PAY

## 2022-02-28 PROCEDURE — V5299A: CUSTOM | Mod: NC

## 2022-03-01 ENCOUNTER — NON-APPOINTMENT (OUTPATIENT)
Age: 87
End: 2022-03-01

## 2022-03-01 PROCEDURE — 93298 REM INTERROG DEV EVAL SCRMS: CPT

## 2022-03-01 PROCEDURE — G2066: CPT

## 2022-03-17 ENCOUNTER — APPOINTMENT (OUTPATIENT)
Dept: PHARMACY | Facility: CLINIC | Age: 87
End: 2022-03-17
Payer: SELF-PAY

## 2022-03-17 PROCEDURE — V5299A: CUSTOM | Mod: NC

## 2022-04-05 ENCOUNTER — NON-APPOINTMENT (OUTPATIENT)
Age: 87
End: 2022-04-05

## 2022-04-05 ENCOUNTER — APPOINTMENT (OUTPATIENT)
Dept: ELECTROPHYSIOLOGY | Facility: CLINIC | Age: 87
End: 2022-04-05
Payer: MEDICARE

## 2022-04-05 ENCOUNTER — APPOINTMENT (OUTPATIENT)
Dept: PHARMACY | Facility: CLINIC | Age: 87
End: 2022-04-05
Payer: SELF-PAY

## 2022-04-05 PROCEDURE — 93298 REM INTERROG DEV EVAL SCRMS: CPT

## 2022-04-05 PROCEDURE — G2066: CPT

## 2022-04-05 PROCEDURE — V5299A: CUSTOM | Mod: NC

## 2022-04-21 ENCOUNTER — APPOINTMENT (OUTPATIENT)
Dept: PHARMACY | Facility: CLINIC | Age: 87
End: 2022-04-21
Payer: SELF-PAY

## 2022-04-21 PROCEDURE — V5299A: CUSTOM | Mod: NC

## 2022-05-10 ENCOUNTER — NON-APPOINTMENT (OUTPATIENT)
Age: 87
End: 2022-05-10

## 2022-05-10 ENCOUNTER — APPOINTMENT (OUTPATIENT)
Dept: ELECTROPHYSIOLOGY | Facility: CLINIC | Age: 87
End: 2022-05-10
Payer: MEDICARE

## 2022-05-10 PROCEDURE — G2066: CPT

## 2022-05-10 PROCEDURE — 93298 REM INTERROG DEV EVAL SCRMS: CPT

## 2022-05-16 ENCOUNTER — APPOINTMENT (OUTPATIENT)
Dept: PHARMACY | Facility: CLINIC | Age: 87
End: 2022-05-16
Payer: SELF-PAY

## 2022-05-16 PROCEDURE — V5299A: CUSTOM | Mod: NC

## 2022-06-14 ENCOUNTER — APPOINTMENT (OUTPATIENT)
Dept: ELECTROPHYSIOLOGY | Facility: CLINIC | Age: 87
End: 2022-06-14
Payer: MEDICARE

## 2022-06-14 ENCOUNTER — NON-APPOINTMENT (OUTPATIENT)
Age: 87
End: 2022-06-14

## 2022-06-14 PROCEDURE — 93298 REM INTERROG DEV EVAL SCRMS: CPT

## 2022-06-14 PROCEDURE — G2066: CPT

## 2022-06-27 ENCOUNTER — APPOINTMENT (OUTPATIENT)
Dept: PHARMACY | Facility: CLINIC | Age: 87
End: 2022-06-27
Payer: SELF-PAY

## 2022-06-27 PROCEDURE — V5299A: CUSTOM | Mod: NC

## 2022-07-06 ENCOUNTER — APPOINTMENT (OUTPATIENT)
Dept: DERMATOLOGY | Facility: CLINIC | Age: 87
End: 2022-07-06

## 2022-07-11 ENCOUNTER — APPOINTMENT (OUTPATIENT)
Dept: DERMATOLOGY | Facility: CLINIC | Age: 87
End: 2022-07-11

## 2022-07-11 DIAGNOSIS — D48.5 NEOPLASM OF UNCERTAIN BEHAVIOR OF SKIN: ICD-10-CM

## 2022-07-11 PROCEDURE — 11102 TANGNTL BX SKIN SINGLE LES: CPT

## 2022-07-11 PROCEDURE — 99213 OFFICE O/P EST LOW 20 MIN: CPT | Mod: 25

## 2022-07-11 NOTE — ASSESSMENT
[FreeTextEntry1] : Lentigines\par Hats and sunscreens.\par \par R/O SCC, possible AFX\par Education.\par Will call patient with results when available.

## 2022-07-11 NOTE — PHYSICAL EXAM
[FreeTextEntry3] : Lentigines diffusely on the scalp and face.\par \par Umbilicated papule with adjacent crust of the left anterior ear.

## 2022-07-11 NOTE — HISTORY OF PRESENT ILLNESS
[FreeTextEntry1] : Fit in for lesion of the left ear. [de-identified] : Growing and irritated.  No self tx.  Weeks duration.

## 2022-07-19 ENCOUNTER — APPOINTMENT (OUTPATIENT)
Dept: ELECTROPHYSIOLOGY | Facility: CLINIC | Age: 87
End: 2022-07-19

## 2022-07-19 ENCOUNTER — NON-APPOINTMENT (OUTPATIENT)
Age: 87
End: 2022-07-19

## 2022-07-19 PROCEDURE — 93298 REM INTERROG DEV EVAL SCRMS: CPT

## 2022-07-19 PROCEDURE — G2066: CPT

## 2022-07-26 LAB — CORE LAB BIOPSY: NORMAL

## 2022-08-04 ENCOUNTER — APPOINTMENT (OUTPATIENT)
Dept: DERMATOLOGY | Facility: CLINIC | Age: 87
End: 2022-08-04

## 2022-08-04 DIAGNOSIS — C44.229 SQUAMOUS CELL CARCINOMA OF SKIN OF LEFT EAR AND EXTERNAL AURICULAR CANAL: ICD-10-CM

## 2022-08-04 PROCEDURE — 17282 DSTR MAL LS F/E/E/N/L/M1.1-2: CPT

## 2022-08-18 ENCOUNTER — APPOINTMENT (OUTPATIENT)
Dept: PHARMACY | Facility: CLINIC | Age: 87
End: 2022-08-18

## 2022-08-18 PROCEDURE — V5299A: CUSTOM | Mod: NC

## 2022-08-22 ENCOUNTER — APPOINTMENT (OUTPATIENT)
Dept: DERMATOLOGY | Facility: CLINIC | Age: 87
End: 2022-08-22

## 2022-08-22 DIAGNOSIS — R58 HEMORRHAGE, NOT ELSEWHERE CLASSIFIED: ICD-10-CM

## 2022-08-22 DIAGNOSIS — L98.491 NON-PRESSURE CHRONIC ULCER OF SKIN OF OTHER SITES LIMITED TO BREAKDOWN OF SKIN: ICD-10-CM

## 2022-08-22 DIAGNOSIS — L01.00 IMPETIGO, UNSPECIFIED: ICD-10-CM

## 2022-08-22 PROCEDURE — 99214 OFFICE O/P EST MOD 30 MIN: CPT

## 2022-08-22 RX ORDER — CEFADROXIL 500 MG/1
500 CAPSULE ORAL TWICE DAILY
Qty: 14 | Refills: 0 | Status: ACTIVE | COMMUNITY
Start: 2022-08-22 | End: 1900-01-01

## 2022-08-22 RX ORDER — MUPIROCIN 20 MG/G
2 OINTMENT TOPICAL
Qty: 1 | Refills: 5 | Status: ACTIVE | COMMUNITY
Start: 2022-08-22 | End: 1900-01-01

## 2022-08-22 NOTE — HISTORY OF PRESENT ILLNESS
[de-identified] : The patient has been fit in for urgent appointment.\par c/o pain, oozing from D&C site L ear from 8/4;

## 2022-08-22 NOTE — ASSESSMENT
[FreeTextEntry1] : ? infection vs. inflammatory process; \par \par Therapeutic options and their risks and benefits; along with multiple diagnostic possibilities were discussed at length; risks and benefits of further study were discussed;\par \par A specimen was sent to the lab for culture and sensitivities. \par Duricef 500 BID\par mupirocin ointment daily; \par call for results 4d;  written instructions given; \par

## 2022-08-23 ENCOUNTER — APPOINTMENT (OUTPATIENT)
Dept: ELECTROPHYSIOLOGY | Facility: CLINIC | Age: 87
End: 2022-08-23

## 2022-08-24 ENCOUNTER — NON-APPOINTMENT (OUTPATIENT)
Age: 87
End: 2022-08-24

## 2022-08-24 PROCEDURE — 93298 REM INTERROG DEV EVAL SCRMS: CPT

## 2022-08-24 PROCEDURE — G2066: CPT

## 2022-08-26 LAB — BACTERIA SPEC CULT: ABNORMAL

## 2022-08-26 RX ORDER — CIPROFLOXACIN HYDROCHLORIDE 500 MG/1
500 TABLET, FILM COATED ORAL
Qty: 20 | Refills: 0 | Status: ACTIVE | COMMUNITY
Start: 2022-08-26 | End: 1900-01-01

## 2022-09-27 ENCOUNTER — APPOINTMENT (OUTPATIENT)
Dept: ELECTROPHYSIOLOGY | Facility: CLINIC | Age: 87
End: 2022-09-27

## 2022-09-28 ENCOUNTER — NON-APPOINTMENT (OUTPATIENT)
Age: 87
End: 2022-09-28

## 2022-09-28 PROCEDURE — G2066: CPT

## 2022-09-28 PROCEDURE — 93298 REM INTERROG DEV EVAL SCRMS: CPT

## 2022-11-02 ENCOUNTER — NON-APPOINTMENT (OUTPATIENT)
Age: 87
End: 2022-11-02

## 2022-11-02 ENCOUNTER — APPOINTMENT (OUTPATIENT)
Dept: ELECTROPHYSIOLOGY | Facility: CLINIC | Age: 87
End: 2022-11-02

## 2022-11-02 PROCEDURE — 93298 REM INTERROG DEV EVAL SCRMS: CPT

## 2022-11-02 PROCEDURE — G2066: CPT

## 2022-11-07 ENCOUNTER — APPOINTMENT (OUTPATIENT)
Dept: DERMATOLOGY | Facility: CLINIC | Age: 87
End: 2022-11-07

## 2022-11-30 ENCOUNTER — APPOINTMENT (OUTPATIENT)
Dept: UROLOGY | Facility: CLINIC | Age: 87
End: 2022-11-30

## 2022-11-30 VITALS
WEIGHT: 173 LBS | HEIGHT: 69.5 IN | HEART RATE: 79 BPM | BODY MASS INDEX: 25.05 KG/M2 | DIASTOLIC BLOOD PRESSURE: 83 MMHG | OXYGEN SATURATION: 97 % | SYSTOLIC BLOOD PRESSURE: 160 MMHG

## 2022-11-30 DIAGNOSIS — N39.43 BENIGN PROSTATIC HYPERPLASIA WITH LOWER URINARY TRACT SYMPMS: ICD-10-CM

## 2022-11-30 DIAGNOSIS — N40.1 BENIGN PROSTATIC HYPERPLASIA WITH LOWER URINARY TRACT SYMPMS: ICD-10-CM

## 2022-11-30 PROCEDURE — 99213 OFFICE O/P EST LOW 20 MIN: CPT

## 2022-11-30 RX ORDER — TAMSULOSIN HYDROCHLORIDE 0.4 MG/1
0.4 CAPSULE ORAL
Qty: 90 | Refills: 3 | Status: ACTIVE | COMMUNITY
Start: 2019-07-15 | End: 1900-01-01

## 2022-11-30 NOTE — HISTORY OF PRESENT ILLNESS
[FreeTextEntry1] : 89M presents today for a checkup. His PSA and urine are normal. He denies complaints.

## 2022-12-07 ENCOUNTER — NON-APPOINTMENT (OUTPATIENT)
Age: 87
End: 2022-12-07

## 2022-12-07 ENCOUNTER — APPOINTMENT (OUTPATIENT)
Dept: ELECTROPHYSIOLOGY | Facility: CLINIC | Age: 87
End: 2022-12-07

## 2022-12-07 PROCEDURE — 93298 REM INTERROG DEV EVAL SCRMS: CPT

## 2022-12-07 PROCEDURE — G2066: CPT

## 2023-01-11 ENCOUNTER — NON-APPOINTMENT (OUTPATIENT)
Age: 88
End: 2023-01-11

## 2023-01-11 ENCOUNTER — APPOINTMENT (OUTPATIENT)
Dept: ELECTROPHYSIOLOGY | Facility: CLINIC | Age: 88
End: 2023-01-11
Payer: MEDICARE

## 2023-01-11 PROCEDURE — G2066: CPT

## 2023-01-11 PROCEDURE — 93298 REM INTERROG DEV EVAL SCRMS: CPT

## 2023-01-24 ENCOUNTER — APPOINTMENT (OUTPATIENT)
Dept: DERMATOLOGY | Facility: CLINIC | Age: 88
End: 2023-01-24

## 2023-02-11 ENCOUNTER — EMERGENCY (EMERGENCY)
Facility: HOSPITAL | Age: 88
LOS: 0 days | Discharge: HOME | End: 2023-02-11
Attending: EMERGENCY MEDICINE
Payer: MEDICARE

## 2023-02-11 VITALS
TEMPERATURE: 98 F | HEART RATE: 68 BPM | OXYGEN SATURATION: 97 % | HEIGHT: 69 IN | DIASTOLIC BLOOD PRESSURE: 98 MMHG | RESPIRATION RATE: 18 BRPM | SYSTOLIC BLOOD PRESSURE: 174 MMHG | WEIGHT: 175.05 LBS

## 2023-02-11 DIAGNOSIS — R25.1 TREMOR, UNSPECIFIED: ICD-10-CM

## 2023-02-11 DIAGNOSIS — M25.572 PAIN IN LEFT ANKLE AND JOINTS OF LEFT FOOT: ICD-10-CM

## 2023-02-11 DIAGNOSIS — Z85.9 PERSONAL HISTORY OF MALIGNANT NEOPLASM, UNSPECIFIED: ICD-10-CM

## 2023-02-11 DIAGNOSIS — F41.9 ANXIETY DISORDER, UNSPECIFIED: ICD-10-CM

## 2023-02-11 DIAGNOSIS — E78.5 HYPERLIPIDEMIA, UNSPECIFIED: ICD-10-CM

## 2023-02-11 DIAGNOSIS — S80.812A ABRASION, LEFT LOWER LEG, INITIAL ENCOUNTER: ICD-10-CM

## 2023-02-11 DIAGNOSIS — I25.10 ATHEROSCLEROTIC HEART DISEASE OF NATIVE CORONARY ARTERY WITHOUT ANGINA PECTORIS: ICD-10-CM

## 2023-02-11 DIAGNOSIS — G62.9 POLYNEUROPATHY, UNSPECIFIED: ICD-10-CM

## 2023-02-11 DIAGNOSIS — Z41.9 ENCOUNTER FOR PROCEDURE FOR PURPOSES OTHER THAN REMEDYING HEALTH STATE, UNSPECIFIED: Chronic | ICD-10-CM

## 2023-02-11 DIAGNOSIS — Z90.49 ACQUIRED ABSENCE OF OTHER SPECIFIED PARTS OF DIGESTIVE TRACT: ICD-10-CM

## 2023-02-11 DIAGNOSIS — Z98.49 CATARACT EXTRACTION STATUS, UNSPECIFIED EYE: Chronic | ICD-10-CM

## 2023-02-11 DIAGNOSIS — S93.402A SPRAIN OF UNSPECIFIED LIGAMENT OF LEFT ANKLE, INITIAL ENCOUNTER: ICD-10-CM
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
## 2023-02-11 DIAGNOSIS — K21.9 GASTRO-ESOPHAGEAL REFLUX DISEASE WITHOUT ESOPHAGITIS: ICD-10-CM

## 2023-02-11 DIAGNOSIS — Z95.1 PRESENCE OF AORTOCORONARY BYPASS GRAFT: Chronic | ICD-10-CM
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
## 2023-02-11 DIAGNOSIS — Z79.82 LONG TERM (CURRENT) USE OF ASPIRIN: ICD-10-CM

## 2023-02-11 DIAGNOSIS — I10 ESSENTIAL (PRIMARY) HYPERTENSION: ICD-10-CM
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
## 2023-02-11 DIAGNOSIS — M48.061 SPINAL STENOSIS, LUMBAR REGION WITHOUT NEUROGENIC CLAUDICATION: ICD-10-CM

## 2023-02-11 DIAGNOSIS — N40.0 BENIGN PROSTATIC HYPERPLASIA WITHOUT LOWER URINARY TRACT SYMPTOMS: ICD-10-CM

## 2023-02-11 DIAGNOSIS — Z95.5 PRESENCE OF CORONARY ANGIOPLASTY IMPLANT AND GRAFT: ICD-10-CM

## 2023-02-11 DIAGNOSIS — Z90.49 ACQUIRED ABSENCE OF OTHER SPECIFIED PARTS OF DIGESTIVE TRACT: Chronic | ICD-10-CM

## 2023-02-11 PROCEDURE — 99284 EMERGENCY DEPT VISIT MOD MDM: CPT | Mod: FS

## 2023-02-11 PROCEDURE — 73590 X-RAY EXAM OF LOWER LEG: CPT | Mod: 26,LT

## 2023-02-11 PROCEDURE — 73610 X-RAY EXAM OF ANKLE: CPT | Mod: 26,LT

## 2023-02-11 PROCEDURE — 73630 X-RAY EXAM OF FOOT: CPT | Mod: 26,LT

## 2023-02-11 PROCEDURE — 99284 EMERGENCY DEPT VISIT MOD MDM: CPT

## 2023-02-11 PROCEDURE — 73590 X-RAY EXAM OF LOWER LEG: CPT | Mod: LT

## 2023-02-11 PROCEDURE — 73610 X-RAY EXAM OF ANKLE: CPT | Mod: LT

## 2023-02-11 PROCEDURE — 73630 X-RAY EXAM OF FOOT: CPT | Mod: LT

## 2023-02-11 NOTE — ED STATDOCS - CARE PROVIDER_API CALL
Serge Davila (DO)  Orthopaedic Surgery  [address] [place] [address]  Phone: [phone]  Fax: [phone]  Follow Up Time:

## 2023-02-11 NOTE — ED STATDOCS - NS ED ATTENDING STATEMENT MOD
This was a shared visit with the GRAY. I reviewed and verified the documentation and independently performed the documented:

## 2023-02-11 NOTE — ED STATDOCS - NS ED ROS FT
Constitutional: No fevers, chills, or sweats.  Cardiac: No chest pain, exertional dyspnea, orthopnea  Respiratory: No shortness of breath, no cough  GI: No abdominal pain, no N/V/D  Neuro: No headaches, no neck pain/stiffness, no numbness  MSK: +left ankle pain  All other systems reviewed and are negative unless otherwise stated in the HPI.

## 2023-02-11 NOTE — ED STATDOCS - ATTENDING APP SHARED VISIT CONTRIBUTION OF CARE
I, Jayson Cardenas MD, personally saw the patient with GRAY.  I have personally performed a face to face diagnostic evaluation on this patient.  I have reviewed the GRAY note and agree with the history, exam, and plan of care, except as noted.

## 2023-02-11 NOTE — ED ADULT TRIAGE NOTE - CHIEF COMPLAINT QUOTE
Patient is alert and oriented X3, presenting to the ER with c/o ankle pain/injury. Patient reports " 10 dyas ago I was walking on my treadmill, when it speed up and throw me off. I injured my left ankle I thought it was going to get better. It hurts when I put weight on it." Patient denies CP, SOB, headache, numbness.  Patient endorses tingling in the left lower extremity. Patient has a foot drop on the left extremity. Patient ambulatory in triage with walker.   HX: HTN

## 2023-02-11 NOTE — ED STATDOCS - NS_ ATTENDINGSCRIBEDETAILS _ED_A_ED_FT
I, Jayson Cardenas MD,  performed the initial face to face bedside interview with this patient regarding history of present illness, review of symptoms and relevant past medical, social and family history.  I completed an independent physical examination.  I was the initial provider who evaluated this patient. I have signed out the follow up of any pending tests (i.e. labs, radiological studies) to the GRAY.  I have communicated the patient’s plan of care and disposition with the GRAY.  The history, relevant review of systems, past medical and surgical history, medical decision making, and physical examination was documented by the scribe in my presence and I attest to the accuracy of the documentation.

## 2023-02-11 NOTE — ED STATDOCS - CLINICAL SUMMARY MEDICAL DECISION MAKING FREE TEXT BOX
Pt w/ fall and LLE pain s/p fall x10 days ago, persistent pain w/ weight bearing, no other injures from fall, no other focal tenderness, neuro intact. Plan for XR of LLE and reassess. Pt w/ fall and LLE pain s/p fall x10 days ago, persistent pain w/ weight bearing, no other injures from fall, no other focal tenderness, neuro intact. Plan for XR of LLE and reassess.          xray negative for acute fracture.  Pt. ambulatory.  Will refer to orthopedics.  IN house referral placed.    Pt. ambulatory with rosalinda Hedrick PA-C

## 2023-02-11 NOTE — ED STATDOCS - PHYSICAL EXAMINATION
General: AAOx3, NAD  HEENT: NCAT  Cardiac: Normal rate and rhythm, no murmurs, normal peripheral perfusion  Respiratory: Normal rate and effort. CTAB  GI: Soft, nondistended, nontender  Neuro: No focal deficits. LORA equally x4, sensation to light touch intact throughout  MSK: Tenderness to medial malleolus, left ankle, trace edema b/l, superficial subacute appearing abrasion to mid tibial region.   Skin: No rash

## 2023-02-11 NOTE — ED STATDOCS - PROGRESS NOTE DETAILS
89 y/o male w/ a PMhx of HTN, CAD, anxiety, melanoma, stented CAD, lumbar stenosis, lumbar radiculopathy, BPH, tremors, GERD, HLD, cholecystectomy, and left foot drop, essential tremor presents with left ankle pain.  Pt. fell off his treadmill 10 days ago when it suddenly sped up.  Pt. fell onto his knees. Denies head trauma.  Pt. is ambulating kareem with pain to the medial aspect of ankle.  Atrophy noted to LLE with pitting edema (chronic).  Noted healing abrasion anterior left tibia, midshaft.  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  PMD:  Dr. Noble.    Kenia Hedrick PA-C xray negative for acute fracture.  Pt. ambulatory.  Will refer to orthopedics.  Kenia Hedrick PA-C xray negative for acute fracture.  Pt. ambulatory.  Will refer to orthopedics.  IN house referral placed.    Pt. ambulatory with rosalinda Hedrick PA-C

## 2023-02-11 NOTE — ED ADULT NURSE NOTE - OBJECTIVE STATEMENT
89 y/o male pt presents to ED c/o left leg pain s/p fall on treadmill 10 days ago. pt reports increased pain, difficulty ambulating.

## 2023-02-11 NOTE — ED STATDOCS - OBJECTIVE STATEMENT
89 y/o male w/ a PMhx of HTN, CAD, anxiety, melanoma, stented CAD, lumbar stenosis, lumbar radiculopathy, BPH, tremors, GERD, HLD, cholecystectomy, and left foot drop presents to the ED s/p left ankle injury x10 days ago. Pt reports he was walking on a treadmill when it suddenly sped up causing him to fall on to his knees, denies head strike or LOC. Pt states he has trouble weight bearing, presented today because the pain was not improving. Denies CP, SOB, HA, or numbness/tingling. Pt on baby ASA. PCP: Dr. Noble.

## 2023-02-11 NOTE — ED ADULT NURSE NOTE - NSFALLRSKPASTHIST_ED_ALL_ED
Eri Ruelas is here for IV hydration due to dehydration.    ECO - No physically strenuous activity, but ambulatory and able to carry out light or sedentary work.    Nursing Assessment:  A comprehensive nursing assessment was performed and the patient reports the following:    Nausea: NO  Vomiting: NO  Fever: NO  Chills: NO  Other signs of infection: NO  Bleeding: NO  Mucositis: NO  Diarrhea: NO  Constipation: NO  Anorexia: NO  Dysuria: NO  Urinary Bleeding: NO  Cough: NO  Shortness of Breath: NO  Fatigue/Weakness: NO  Numbness/Tingling: NO  Other Neuropathies: NO  Edema: NO  Rash: NO  Hand/Foot Syndrome: NO  Pain: NO  Anxiety/Depression/Insomnia: NO    Fatuma Rivas NP notified of patients BP of 90/57 sitting and 77/48 standing. Patient to receive 1 liter normal saline today. Over the weekend patient to possibly receive IV fluids. Parameters for fluids are: Systolic <100 or Diastolic < 60 patient to receive IVFs as long as there are no signs of edema or fluid overload.  -Patient missed her zarxio appointment yesterday . Patient to receive zarxio today, tomorrow  and . Patient to return  for CBC RN check with possible zarxio.    Central Line Type: Port  Central Line Site: Right  and Chest  Port cleansed with ChloraPrep per protocol.  Accessed via: 20 gauge Goode needle  Patency Verification: Blood return greater or equal to 3 ml before, during and after treatment  Port flushed with: 20 ml 0.9 normal saline and 10 un/ml- 50 units heparin  Goode needle removed: Yes  Deaccess/site appearance: Clear (no redness, tenderness, or edema), dressing applied if required    Weight:  Wt Readings from Last 1 Encounters:   18 71.6 kg     Labs:  Sodium (mmol/L)   Date Value   2018 141     Potassium (mmol/L)   Date Value   2018 4.1     Chloride (mmol/L)   Date Value   2018 106     Glucose (mg/dL)   Date Value   2018 114 (H)     CALCIUM (mg/dL)   Date Value   2018 8.7      Carbon Dioxide (mmol/L)   Date Value   05/14/2018 25     BUN (mg/dL)   Date Value   05/14/2018 14     Creatinine (mg/dL)   Date Value   05/14/2018 0.82     MAGNESIUM (mg/dL)   Date Value   10/07/2017 1.7       Treatment:  Refer to [facility] and MAR for line assessment and medication administration    Post Treatment: Treatment tolerated well; no adverse reaction    Patient Education: Patient encouraged to increase oral fluid intake.    Patient Discharged: patient discharged to home per self, ambulatory    Next appointment scheduled:   Future Appointments  Date Time Provider Department Center   5/19/2018 8:30 AM [facility] TREATMENT CHAIR [facility]   5/20/2018 8:30 AM [facility] TREATMENT CHAIR [facility]   5/21/2018 2:00 PM [facility]   5/21/2018 2:30 PM [facility] TREATMENT CHAIR [facility]   5/29/2018 2:00 PM [facility]   5/29/2018 2:30 PM [facility] TREATMENT CHAIR [facility]   6/5/2018 9:15 AM [facility]   6/5/2018 9:30 AM Shahana Escalera MD [facility]     Patient instructed to call the office with any questions or concerns.  Dr. Herminio Noland is supervising provider today.       yes

## 2023-02-11 NOTE — ED STATDOCS - PATIENT PORTAL LINK FT
You can access the FollowMyHealth Patient Portal offered by [facility] by registering at the following website: [email or web address]. By joining [employer]’s FollowMyHealth portal, you will also be able to view your health information using other applications (apps) compatible with our system.

## 2023-02-16 ENCOUNTER — APPOINTMENT (OUTPATIENT)
Dept: ELECTROPHYSIOLOGY | Facility: CLINIC | Age: 88
End: 2023-02-16
Payer: MEDICARE

## 2023-02-17 ENCOUNTER — NON-APPOINTMENT (OUTPATIENT)
Age: 88
End: 2023-02-17

## 2023-02-17 PROCEDURE — 93298 REM INTERROG DEV EVAL SCRMS: CPT

## 2023-02-17 PROCEDURE — G2066: CPT

## 2023-03-23 ENCOUNTER — APPOINTMENT (OUTPATIENT)
Dept: ELECTROPHYSIOLOGY | Facility: CLINIC | Age: 88
End: 2023-03-23
Payer: MEDICARE

## 2023-03-23 ENCOUNTER — NON-APPOINTMENT (OUTPATIENT)
Age: 88
End: 2023-03-23

## 2023-03-23 PROCEDURE — G2066: CPT

## 2023-03-23 PROCEDURE — 93298 REM INTERROG DEV EVAL SCRMS: CPT

## 2023-03-28 ENCOUNTER — APPOINTMENT (OUTPATIENT)
Dept: DERMATOLOGY | Facility: CLINIC | Age: 88
End: 2023-03-28
Payer: MEDICARE

## 2023-03-28 DIAGNOSIS — L81.4 OTHER MELANIN HYPERPIGMENTATION: ICD-10-CM

## 2023-03-28 DIAGNOSIS — D22.9 MELANOCYTIC NEVI, UNSPECIFIED: ICD-10-CM

## 2023-03-28 DIAGNOSIS — Z87.2 PERSONAL HISTORY OF DISEASES OF THE SKIN AND SUBCUTANEOUS TISSUE: ICD-10-CM

## 2023-03-28 DIAGNOSIS — L82.0 INFLAMED SEBORRHEIC KERATOSIS: ICD-10-CM

## 2023-03-28 PROCEDURE — 99213 OFFICE O/P EST LOW 20 MIN: CPT | Mod: 25

## 2023-03-28 PROCEDURE — 17110 DESTRUCTION B9 LES UP TO 14: CPT

## 2023-03-28 NOTE — PHYSICAL EXAM
[FreeTextEntry3] : AAOx3, pleasant, NAD, no visual lymphadenopathy\par hair, scalp, face, nose, eyelids, ears, lips, oropharynx, neck, chest, abdomen, back, right arm, left arm, nails, and hands examined with all normal findings,\par pertinent findings include:\par \par multiple benign nevi and lentigines\par + inflamed, waxy, keratotic papule on back

## 2023-04-24 NOTE — ED ADULT NURSE NOTE - GASTROINTESTINAL WDL
no heparin drip as per Cardiology. medication management only Abdomen soft, nontender, nondistended, bowel sounds present in all 4 quadrants.

## 2023-04-27 ENCOUNTER — APPOINTMENT (OUTPATIENT)
Dept: ELECTROPHYSIOLOGY | Facility: CLINIC | Age: 88
End: 2023-04-27

## 2023-06-22 ENCOUNTER — APPOINTMENT (OUTPATIENT)
Dept: ORTHOPEDIC SURGERY | Facility: CLINIC | Age: 88
End: 2023-06-22
Payer: MEDICARE

## 2023-06-22 VITALS
BODY MASS INDEX: 25.05 KG/M2 | HEART RATE: 65 BPM | WEIGHT: 173 LBS | SYSTOLIC BLOOD PRESSURE: 155 MMHG | DIASTOLIC BLOOD PRESSURE: 78 MMHG | HEIGHT: 69.5 IN

## 2023-06-22 DIAGNOSIS — M75.42 IMPINGEMENT SYNDROME OF LEFT SHOULDER: ICD-10-CM

## 2023-06-22 DIAGNOSIS — M19.012 PRIMARY OSTEOARTHRITIS, LEFT SHOULDER: ICD-10-CM

## 2023-06-22 PROCEDURE — 99203 OFFICE O/P NEW LOW 30 MIN: CPT | Mod: 25

## 2023-06-22 PROCEDURE — 20610 DRAIN/INJ JOINT/BURSA W/O US: CPT | Mod: LT

## 2023-06-22 PROCEDURE — 73030 X-RAY EXAM OF SHOULDER: CPT | Mod: LT

## 2023-06-29 NOTE — HISTORY OF PRESENT ILLNESS
[de-identified] : The patient comes in today with persistent complaints referable to his left shoulder.

## 2023-06-29 NOTE — CONSULT LETTER
[Dear  ___] : Dear  [unfilled], [Consult Letter:] : I had the pleasure of evaluating your patient, [unfilled]. [Please see my note below.] : Please see my note below. [Consult Closing:] : Thank you very much for allowing me to participate in the care of this patient.  If you have any questions, please do not hesitate to contact me. [Sincerely,] : Sincerely, [FreeTextEntry3] : Zeke Ham, III, MD\par

## 2023-06-29 NOTE — PROCEDURE
[de-identified] : Indication:  \par Osteoarthritis with impingement syndrome left shoulder\par \par Consent: \par At this time, I have recommended an injection to the left shoulder.  The risks and benefits of the procedure were discussed with the patient in detail.  Upon verbal consent of the patient, we proceeded with the injection as noted below.  \par \par Description of Procedure:  \par After a sterile prep, the patient underwent an injection of approximately 9 mL of 1% Lidocaine (10 mg/mL) without epinephrine and 1 mL of Kenalog (40 mg/mL) into the left shoulder.  The patient tolerated the procedure well.  There were no complications.  \par \par :  [employer]par Drug Name:  Triamcinolone Acetonide Injectable Suspension USP\par NCD#:  [id number]\par Lot#:  [id number]\par Expiration Date:  01/2024

## 2023-06-29 NOTE — DISCUSSION/SUMMARY
[de-identified] : At this time, due to osteoarthritis with impingement syndrome of the left shoulder, the patient was given a cortisone injection.  I recommend ice, elevation and reassessment in two weeks.

## 2023-06-29 NOTE — PHYSICAL EXAM
[Normal] : Gait: normal [de-identified] : Right Shoulder: \par Shoulder: Range of Motion in Degrees:   	\par    	                        Claimant:          Normal:  	\par Abduction (Active)  	180  	180 degrees  	\par Abduction (Passive)  	180  	180 degrees  	\par Forward elevation (Active):  	180  	180 degrees  	\par Forward elevation (Passive):  180  	180 degrees  	\par External rotation (Active):  	45  	45 degrees  	\par External rotation (Passive):  	45  	45 degrees  	\par Internal rotation (Active):  	L-1  	L-1  	\par Internal rotation (Passive):  	L-1  	L-1  	\par \par No motor weakness to internal rotation, external rotation or abduction in the scapular plane.  Negative crank test.  Negative O’Andrés’s test.  Negative Speed’s test. Negative Yergason’s test.  Negative cross arm test.  No tenderness to palpation at the AC joint. Negative Hawkin’s sign.  Negative Neer’s sign.  Negative apprehension. Negative sulcus sign.  No gross neurological or vascular deficits distally.  Skin is intact.  No rashes, scars or lesions.  2+ radial and ulnar pulses.  No extra-articular swelling or tenderness. \par \par Left Shoulder: \par Shoulder: Range of Motion in Degrees:  	\par 	                                  Claimant:	Normal:	\par Abduction (Active)	                  160	               180 degrees	\par Abduction (Passive)	  160	               180 degrees	\par Forward elevation (Active):	  160	               180 degrees	\par Forward elevation (Passive):	  160	               180 degrees	\par External rotation (Active):	   20	               45 degrees	\par External rotation (Passive):	   20	               45 degrees	\par Internal rotation (Active):	   to side                 L-1	\par Internal rotation (Passive):	   to side	               L-1	\par \par Diffuse crepitations and tenderness throughout range of motion.  Pain and swelling throughout range of motion, more significant at extremes.  No motor weakness to internal rotation, external rotation or abduction in the scapular plane.  Negative crank test.  Negative O’Andrés’s test.  Negative Speed’s test.  Negative Yergason’s test.  Negative cross arm test.  No tenderness to palpation at the AC joint.  Positive Hawkin’s sign.  Positive Neer’s sign.  Negative apprehension. Negative sulcus sign.  No gross neurological or vascular deficits distally.  Skin is intact.  No rashes, scars or lesions.  2+ radial and ulnar pulses. No extra-articular swelling or tenderness.\par  [de-identified] : Station:  Normal. [de-identified] : Appearance:  Well-developed, well-nourished male in no acute distress.\par   [de-identified] : Radiographs, two views of the left shoulder taken in the office today, show early severe arthritic changes.

## 2023-06-29 NOTE — ADDENDUM
[FreeTextEntry1] : This note was written by Thalia Servin on 06/29/2023 acting as scribe for Zeke Ham III, MD

## 2023-09-08 NOTE — ED ADULT TRIAGE NOTE - PAIN RATING/NUMBER SCALE (0-10): REST
Received request via: Pharmacy    Was the patient seen in the last year in this department? Yes    Does the patient have an active prescription (recently filled or refills available) for medication(s) requested? No    Does the patient have snf Plus and need 100 day supply (blood pressure, diabetes and cholesterol meds only)? Patient does not have SCP  
9

## 2023-09-26 NOTE — ED ADULT TRIAGE NOTE - MEANS OF ARRIVAL
Procedure:  CHOLECYSTECTOMY LAPAROSCOPIC (Abdomen)    Relevant Problems   CARDIO   (+) Headache, variant migraine      NEURO/PSYCH   (+) Anxiety   (+) Depression   (+) Headache, variant migraine        Physical Exam    Airway    Mallampati score: II  TM Distance: >3 FB  Neck ROM: full     Dental   No notable dental hx     Cardiovascular  Cardiovascular exam normal    Pulmonary  Pulmonary exam normal     Other Findings        Anesthesia Plan  ASA Score- 2     Anesthesia Type- general with ASA Monitors. Additional Monitors:   Airway Plan: ETT. Plan Factors-Exercise tolerance (METS): >4 METS. Chart reviewed. Existing labs reviewed. Patient summary reviewed. Patient is a current smoker. Patient smoked on day of surgery. Induction- intravenous. Postoperative Plan- Plan for postoperative opioid use. Informed Consent- Anesthetic plan and risks discussed with patient. I personally reviewed this patient with the CRNA. Discussed and agreed on the Anesthesia Plan with the CRNA. Manfred Thacker wheelchair

## 2023-11-22 ENCOUNTER — INPATIENT (INPATIENT)
Facility: HOSPITAL | Age: 88
LOS: 6 days | Discharge: NURSING, REHAB OR LONG-TERM CARE | DRG: 57 | End: 2023-11-29
Attending: FAMILY MEDICINE | Admitting: FAMILY MEDICINE
Payer: MEDICARE

## 2023-11-22 VITALS
DIASTOLIC BLOOD PRESSURE: 86 MMHG | TEMPERATURE: 98 F | SYSTOLIC BLOOD PRESSURE: 137 MMHG | RESPIRATION RATE: 18 BRPM | HEART RATE: 93 BPM | HEIGHT: 69 IN | OXYGEN SATURATION: 91 % | WEIGHT: 169.98 LBS

## 2023-11-22 DIAGNOSIS — R26.2 DIFFICULTY IN WALKING, NOT ELSEWHERE CLASSIFIED: ICD-10-CM

## 2023-11-22 DIAGNOSIS — Z41.9 ENCOUNTER FOR PROCEDURE FOR PURPOSES OTHER THAN REMEDYING HEALTH STATE, UNSPECIFIED: Chronic | ICD-10-CM

## 2023-11-22 DIAGNOSIS — Z90.49 ACQUIRED ABSENCE OF OTHER SPECIFIED PARTS OF DIGESTIVE TRACT: Chronic | ICD-10-CM

## 2023-11-22 DIAGNOSIS — Z95.1 PRESENCE OF AORTOCORONARY BYPASS GRAFT: Chronic | ICD-10-CM

## 2023-11-22 DIAGNOSIS — Z98.49 CATARACT EXTRACTION STATUS, UNSPECIFIED EYE: Chronic | ICD-10-CM

## 2023-11-22 LAB
ALBUMIN SERPL ELPH-MCNC: 3.6 G/DL — SIGNIFICANT CHANGE UP (ref 3.3–5)
ALBUMIN SERPL ELPH-MCNC: 3.6 G/DL — SIGNIFICANT CHANGE UP (ref 3.3–5)
ALP SERPL-CCNC: 93 U/L — SIGNIFICANT CHANGE UP (ref 40–120)
ALP SERPL-CCNC: 93 U/L — SIGNIFICANT CHANGE UP (ref 40–120)
ALT FLD-CCNC: 20 U/L — SIGNIFICANT CHANGE UP (ref 12–78)
ALT FLD-CCNC: 20 U/L — SIGNIFICANT CHANGE UP (ref 12–78)
ANION GAP SERPL CALC-SCNC: 5 MMOL/L — SIGNIFICANT CHANGE UP (ref 5–17)
ANION GAP SERPL CALC-SCNC: 5 MMOL/L — SIGNIFICANT CHANGE UP (ref 5–17)
APPEARANCE UR: CLEAR — SIGNIFICANT CHANGE UP
APPEARANCE UR: CLEAR — SIGNIFICANT CHANGE UP
APTT BLD: 32.9 SEC — SIGNIFICANT CHANGE UP (ref 24.5–35.6)
APTT BLD: 32.9 SEC — SIGNIFICANT CHANGE UP (ref 24.5–35.6)
AST SERPL-CCNC: 38 U/L — HIGH (ref 15–37)
AST SERPL-CCNC: 38 U/L — HIGH (ref 15–37)
BACTERIA # UR AUTO: NEGATIVE /HPF — SIGNIFICANT CHANGE UP
BACTERIA # UR AUTO: NEGATIVE /HPF — SIGNIFICANT CHANGE UP
BASOPHILS # BLD AUTO: 0.05 K/UL — SIGNIFICANT CHANGE UP (ref 0–0.2)
BASOPHILS # BLD AUTO: 0.05 K/UL — SIGNIFICANT CHANGE UP (ref 0–0.2)
BASOPHILS NFR BLD AUTO: 0.3 % — SIGNIFICANT CHANGE UP (ref 0–2)
BASOPHILS NFR BLD AUTO: 0.3 % — SIGNIFICANT CHANGE UP (ref 0–2)
BILIRUB SERPL-MCNC: 1 MG/DL — SIGNIFICANT CHANGE UP (ref 0.2–1.2)
BILIRUB SERPL-MCNC: 1 MG/DL — SIGNIFICANT CHANGE UP (ref 0.2–1.2)
BILIRUB UR-MCNC: NEGATIVE — SIGNIFICANT CHANGE UP
BILIRUB UR-MCNC: NEGATIVE — SIGNIFICANT CHANGE UP
BUN SERPL-MCNC: 25 MG/DL — HIGH (ref 7–23)
BUN SERPL-MCNC: 25 MG/DL — HIGH (ref 7–23)
CALCIUM SERPL-MCNC: 9.4 MG/DL — SIGNIFICANT CHANGE UP (ref 8.5–10.1)
CALCIUM SERPL-MCNC: 9.4 MG/DL — SIGNIFICANT CHANGE UP (ref 8.5–10.1)
CAST: 2 /LPF — SIGNIFICANT CHANGE UP (ref 0–4)
CAST: 2 /LPF — SIGNIFICANT CHANGE UP (ref 0–4)
CHLORIDE SERPL-SCNC: 104 MMOL/L — SIGNIFICANT CHANGE UP (ref 96–108)
CHLORIDE SERPL-SCNC: 104 MMOL/L — SIGNIFICANT CHANGE UP (ref 96–108)
CK SERPL-CCNC: 571 U/L — HIGH (ref 26–308)
CK SERPL-CCNC: 571 U/L — HIGH (ref 26–308)
CO2 SERPL-SCNC: 28 MMOL/L — SIGNIFICANT CHANGE UP (ref 22–31)
CO2 SERPL-SCNC: 28 MMOL/L — SIGNIFICANT CHANGE UP (ref 22–31)
COLOR SPEC: YELLOW — SIGNIFICANT CHANGE UP
COLOR SPEC: YELLOW — SIGNIFICANT CHANGE UP
CREAT SERPL-MCNC: 1.28 MG/DL — SIGNIFICANT CHANGE UP (ref 0.5–1.3)
CREAT SERPL-MCNC: 1.28 MG/DL — SIGNIFICANT CHANGE UP (ref 0.5–1.3)
DIFF PNL FLD: ABNORMAL
DIFF PNL FLD: ABNORMAL
EGFR: 53 ML/MIN/1.73M2 — LOW
EGFR: 53 ML/MIN/1.73M2 — LOW
EOSINOPHIL # BLD AUTO: 0.02 K/UL — SIGNIFICANT CHANGE UP (ref 0–0.5)
EOSINOPHIL # BLD AUTO: 0.02 K/UL — SIGNIFICANT CHANGE UP (ref 0–0.5)
EOSINOPHIL NFR BLD AUTO: 0.1 % — SIGNIFICANT CHANGE UP (ref 0–6)
EOSINOPHIL NFR BLD AUTO: 0.1 % — SIGNIFICANT CHANGE UP (ref 0–6)
GLUCOSE SERPL-MCNC: 107 MG/DL — HIGH (ref 70–99)
GLUCOSE SERPL-MCNC: 107 MG/DL — HIGH (ref 70–99)
GLUCOSE UR QL: NEGATIVE MG/DL — SIGNIFICANT CHANGE UP
GLUCOSE UR QL: NEGATIVE MG/DL — SIGNIFICANT CHANGE UP
HCT VFR BLD CALC: 42.6 % — SIGNIFICANT CHANGE UP (ref 39–50)
HCT VFR BLD CALC: 42.6 % — SIGNIFICANT CHANGE UP (ref 39–50)
HGB BLD-MCNC: 14.6 G/DL — SIGNIFICANT CHANGE UP (ref 13–17)
HGB BLD-MCNC: 14.6 G/DL — SIGNIFICANT CHANGE UP (ref 13–17)
IMM GRANULOCYTES NFR BLD AUTO: 0.4 % — SIGNIFICANT CHANGE UP (ref 0–0.9)
IMM GRANULOCYTES NFR BLD AUTO: 0.4 % — SIGNIFICANT CHANGE UP (ref 0–0.9)
INR BLD: 1.19 RATIO — HIGH (ref 0.85–1.18)
INR BLD: 1.19 RATIO — HIGH (ref 0.85–1.18)
KETONES UR-MCNC: ABNORMAL MG/DL
KETONES UR-MCNC: ABNORMAL MG/DL
LEUKOCYTE ESTERASE UR-ACNC: NEGATIVE — SIGNIFICANT CHANGE UP
LEUKOCYTE ESTERASE UR-ACNC: NEGATIVE — SIGNIFICANT CHANGE UP
LYMPHOCYTES # BLD AUTO: 11.2 % — LOW (ref 13–44)
LYMPHOCYTES # BLD AUTO: 11.2 % — LOW (ref 13–44)
LYMPHOCYTES # BLD AUTO: 2.15 K/UL — SIGNIFICANT CHANGE UP (ref 1–3.3)
LYMPHOCYTES # BLD AUTO: 2.15 K/UL — SIGNIFICANT CHANGE UP (ref 1–3.3)
MCHC RBC-ENTMCNC: 31.1 PG — SIGNIFICANT CHANGE UP (ref 27–34)
MCHC RBC-ENTMCNC: 31.1 PG — SIGNIFICANT CHANGE UP (ref 27–34)
MCHC RBC-ENTMCNC: 34.3 GM/DL — SIGNIFICANT CHANGE UP (ref 32–36)
MCHC RBC-ENTMCNC: 34.3 GM/DL — SIGNIFICANT CHANGE UP (ref 32–36)
MCV RBC AUTO: 90.8 FL — SIGNIFICANT CHANGE UP (ref 80–100)
MCV RBC AUTO: 90.8 FL — SIGNIFICANT CHANGE UP (ref 80–100)
MONOCYTES # BLD AUTO: 1.02 K/UL — HIGH (ref 0–0.9)
MONOCYTES # BLD AUTO: 1.02 K/UL — HIGH (ref 0–0.9)
MONOCYTES NFR BLD AUTO: 5.3 % — SIGNIFICANT CHANGE UP (ref 2–14)
MONOCYTES NFR BLD AUTO: 5.3 % — SIGNIFICANT CHANGE UP (ref 2–14)
NEUTROPHILS # BLD AUTO: 15.86 K/UL — HIGH (ref 1.8–7.4)
NEUTROPHILS # BLD AUTO: 15.86 K/UL — HIGH (ref 1.8–7.4)
NEUTROPHILS NFR BLD AUTO: 82.7 % — HIGH (ref 43–77)
NEUTROPHILS NFR BLD AUTO: 82.7 % — HIGH (ref 43–77)
NITRITE UR-MCNC: NEGATIVE — SIGNIFICANT CHANGE UP
NITRITE UR-MCNC: NEGATIVE — SIGNIFICANT CHANGE UP
PH UR: 5.5 — SIGNIFICANT CHANGE UP (ref 5–8)
PH UR: 5.5 — SIGNIFICANT CHANGE UP (ref 5–8)
PLATELET # BLD AUTO: 175 K/UL — SIGNIFICANT CHANGE UP (ref 150–400)
PLATELET # BLD AUTO: 175 K/UL — SIGNIFICANT CHANGE UP (ref 150–400)
POTASSIUM SERPL-MCNC: 3.7 MMOL/L — SIGNIFICANT CHANGE UP (ref 3.5–5.3)
POTASSIUM SERPL-MCNC: 3.7 MMOL/L — SIGNIFICANT CHANGE UP (ref 3.5–5.3)
POTASSIUM SERPL-SCNC: 3.7 MMOL/L — SIGNIFICANT CHANGE UP (ref 3.5–5.3)
POTASSIUM SERPL-SCNC: 3.7 MMOL/L — SIGNIFICANT CHANGE UP (ref 3.5–5.3)
PROT SERPL-MCNC: 7.3 GM/DL — SIGNIFICANT CHANGE UP (ref 6–8.3)
PROT SERPL-MCNC: 7.3 GM/DL — SIGNIFICANT CHANGE UP (ref 6–8.3)
PROT UR-MCNC: 30 MG/DL
PROT UR-MCNC: 30 MG/DL
PROTHROM AB SERPL-ACNC: 13.4 SEC — HIGH (ref 9.5–13)
PROTHROM AB SERPL-ACNC: 13.4 SEC — HIGH (ref 9.5–13)
RBC # BLD: 4.69 M/UL — SIGNIFICANT CHANGE UP (ref 4.2–5.8)
RBC # BLD: 4.69 M/UL — SIGNIFICANT CHANGE UP (ref 4.2–5.8)
RBC # FLD: 12.3 % — SIGNIFICANT CHANGE UP (ref 10.3–14.5)
RBC # FLD: 12.3 % — SIGNIFICANT CHANGE UP (ref 10.3–14.5)
RBC CASTS # UR COMP ASSIST: 12 /HPF — HIGH (ref 0–4)
RBC CASTS # UR COMP ASSIST: 12 /HPF — HIGH (ref 0–4)
SODIUM SERPL-SCNC: 137 MMOL/L — SIGNIFICANT CHANGE UP (ref 135–145)
SODIUM SERPL-SCNC: 137 MMOL/L — SIGNIFICANT CHANGE UP (ref 135–145)
SP GR SPEC: 1.03 — SIGNIFICANT CHANGE UP (ref 1–1.03)
SP GR SPEC: 1.03 — SIGNIFICANT CHANGE UP (ref 1–1.03)
SQUAMOUS # UR AUTO: 1 /HPF — SIGNIFICANT CHANGE UP (ref 0–5)
SQUAMOUS # UR AUTO: 1 /HPF — SIGNIFICANT CHANGE UP (ref 0–5)
UROBILINOGEN FLD QL: 1 MG/DL — SIGNIFICANT CHANGE UP (ref 0.2–1)
UROBILINOGEN FLD QL: 1 MG/DL — SIGNIFICANT CHANGE UP (ref 0.2–1)
WBC # BLD: 19.17 K/UL — HIGH (ref 3.8–10.5)
WBC # BLD: 19.17 K/UL — HIGH (ref 3.8–10.5)
WBC # FLD AUTO: 19.17 K/UL — HIGH (ref 3.8–10.5)
WBC # FLD AUTO: 19.17 K/UL — HIGH (ref 3.8–10.5)
WBC UR QL: 5 /HPF — SIGNIFICANT CHANGE UP (ref 0–5)
WBC UR QL: 5 /HPF — SIGNIFICANT CHANGE UP (ref 0–5)

## 2023-11-22 PROCEDURE — 80053 COMPREHEN METABOLIC PANEL: CPT

## 2023-11-22 PROCEDURE — 82746 ASSAY OF FOLIC ACID SERUM: CPT

## 2023-11-22 PROCEDURE — 97162 PT EVAL MOD COMPLEX 30 MIN: CPT | Mod: GP

## 2023-11-22 PROCEDURE — 71045 X-RAY EXAM CHEST 1 VIEW: CPT

## 2023-11-22 PROCEDURE — 99497 ADVNCD CARE PLAN 30 MIN: CPT | Mod: 25

## 2023-11-22 PROCEDURE — 97116 GAIT TRAINING THERAPY: CPT | Mod: GP

## 2023-11-22 PROCEDURE — 85025 COMPLETE CBC W/AUTO DIFF WBC: CPT

## 2023-11-22 PROCEDURE — 81001 URINALYSIS AUTO W/SCOPE: CPT

## 2023-11-22 PROCEDURE — 97530 THERAPEUTIC ACTIVITIES: CPT | Mod: GP

## 2023-11-22 PROCEDURE — 87040 BLOOD CULTURE FOR BACTERIA: CPT

## 2023-11-22 PROCEDURE — 93010 ELECTROCARDIOGRAM REPORT: CPT

## 2023-11-22 PROCEDURE — 82550 ASSAY OF CK (CPK): CPT

## 2023-11-22 PROCEDURE — 85610 PROTHROMBIN TIME: CPT

## 2023-11-22 PROCEDURE — 73521 X-RAY EXAM HIPS BI 2 VIEWS: CPT | Mod: 26

## 2023-11-22 PROCEDURE — 85730 THROMBOPLASTIN TIME PARTIAL: CPT

## 2023-11-22 PROCEDURE — 93970 EXTREMITY STUDY: CPT

## 2023-11-22 PROCEDURE — 93005 ELECTROCARDIOGRAM TRACING: CPT

## 2023-11-22 PROCEDURE — 71045 X-RAY EXAM CHEST 1 VIEW: CPT | Mod: 26

## 2023-11-22 PROCEDURE — 82306 VITAMIN D 25 HYDROXY: CPT

## 2023-11-22 PROCEDURE — 76882 US LMTD JT/FCL EVL NVASC XTR: CPT | Mod: 26,LT

## 2023-11-22 PROCEDURE — 99285 EMERGENCY DEPT VISIT HI MDM: CPT

## 2023-11-22 PROCEDURE — 99223 1ST HOSP IP/OBS HIGH 75: CPT | Mod: 25

## 2023-11-22 PROCEDURE — 85027 COMPLETE CBC AUTOMATED: CPT

## 2023-11-22 PROCEDURE — 87086 URINE CULTURE/COLONY COUNT: CPT

## 2023-11-22 PROCEDURE — 73030 X-RAY EXAM OF SHOULDER: CPT | Mod: RT

## 2023-11-22 PROCEDURE — 36415 COLL VENOUS BLD VENIPUNCTURE: CPT

## 2023-11-22 PROCEDURE — 73552 X-RAY EXAM OF FEMUR 2/>: CPT | Mod: 26,50

## 2023-11-22 PROCEDURE — 74018 RADEX ABDOMEN 1 VIEW: CPT

## 2023-11-22 PROCEDURE — 80048 BASIC METABOLIC PNL TOTAL CA: CPT

## 2023-11-22 PROCEDURE — 82607 VITAMIN B-12: CPT

## 2023-11-22 PROCEDURE — 84443 ASSAY THYROID STIM HORMONE: CPT

## 2023-11-22 RX ORDER — LEVETIRACETAM 250 MG/1
1 TABLET, FILM COATED ORAL
Refills: 0 | DISCHARGE

## 2023-11-22 RX ORDER — GABAPENTIN 400 MG/1
100 CAPSULE ORAL THREE TIMES A DAY
Refills: 0 | Status: DISCONTINUED | OUTPATIENT
Start: 2023-11-22 | End: 2023-11-23

## 2023-11-22 RX ORDER — ROSUVASTATIN CALCIUM 5 MG/1
1 TABLET ORAL
Qty: 0 | Refills: 0 | DISCHARGE

## 2023-11-22 RX ORDER — OMEPRAZOLE 10 MG/1
1 CAPSULE, DELAYED RELEASE ORAL
Qty: 0 | Refills: 0 | DISCHARGE

## 2023-11-22 RX ORDER — GABAPENTIN 400 MG/1
100 CAPSULE ORAL ONCE
Refills: 0 | Status: COMPLETED | OUTPATIENT
Start: 2023-11-22 | End: 2023-11-22

## 2023-11-22 RX ORDER — UBIDECARENONE 100 MG
1 CAPSULE ORAL
Qty: 0 | Refills: 0 | DISCHARGE

## 2023-11-22 RX ORDER — LEVETIRACETAM 250 MG/1
250 TABLET, FILM COATED ORAL
Refills: 0 | Status: DISCONTINUED | OUTPATIENT
Start: 2023-11-22 | End: 2023-11-29

## 2023-11-22 RX ORDER — ASPIRIN/CALCIUM CARB/MAGNESIUM 324 MG
1 TABLET ORAL
Qty: 0 | Refills: 0 | DISCHARGE

## 2023-11-22 RX ORDER — LANOLIN ALCOHOL/MO/W.PET/CERES
3 CREAM (GRAM) TOPICAL AT BEDTIME
Refills: 0 | Status: DISCONTINUED | OUTPATIENT
Start: 2023-11-22 | End: 2023-11-29

## 2023-11-22 RX ORDER — OXYCODONE HYDROCHLORIDE 5 MG/1
0.5 TABLET ORAL
Qty: 0 | Refills: 0 | DISCHARGE

## 2023-11-22 RX ORDER — ATORVASTATIN CALCIUM 80 MG/1
40 TABLET, FILM COATED ORAL AT BEDTIME
Refills: 0 | Status: DISCONTINUED | OUTPATIENT
Start: 2023-11-22 | End: 2023-11-29

## 2023-11-22 RX ORDER — LOSARTAN POTASSIUM 100 MG/1
25 TABLET, FILM COATED ORAL DAILY
Refills: 0 | Status: DISCONTINUED | OUTPATIENT
Start: 2023-11-22 | End: 2023-11-29

## 2023-11-22 RX ORDER — TAMSULOSIN HYDROCHLORIDE 0.4 MG/1
1 CAPSULE ORAL
Qty: 0 | Refills: 0 | DISCHARGE

## 2023-11-22 RX ORDER — CARBIDOPA AND LEVODOPA 25; 100 MG/1; MG/1
1 TABLET ORAL ONCE
Refills: 0 | Status: COMPLETED | OUTPATIENT
Start: 2023-11-22 | End: 2023-11-22

## 2023-11-22 RX ORDER — IRBESARTAN 75 MG/1
1 TABLET ORAL
Refills: 0 | DISCHARGE

## 2023-11-22 RX ORDER — ROSUVASTATIN CALCIUM 5 MG/1
1 TABLET ORAL
Refills: 0 | DISCHARGE

## 2023-11-22 RX ORDER — CARBIDOPA AND LEVODOPA 25; 100 MG/1; MG/1
1 TABLET ORAL THREE TIMES A DAY
Refills: 0 | Status: DISCONTINUED | OUTPATIENT
Start: 2023-11-22 | End: 2023-11-23

## 2023-11-22 RX ORDER — SODIUM CHLORIDE 9 MG/ML
500 INJECTION INTRAMUSCULAR; INTRAVENOUS; SUBCUTANEOUS ONCE
Refills: 0 | Status: COMPLETED | OUTPATIENT
Start: 2023-11-22 | End: 2023-11-22

## 2023-11-22 RX ORDER — ACETAMINOPHEN 500 MG
650 TABLET ORAL ONCE
Refills: 0 | Status: COMPLETED | OUTPATIENT
Start: 2023-11-22 | End: 2023-11-22

## 2023-11-22 RX ORDER — IRBESARTAN 75 MG/1
1 TABLET ORAL
Qty: 0 | Refills: 0 | DISCHARGE

## 2023-11-22 RX ORDER — ACETAMINOPHEN 500 MG
650 TABLET ORAL EVERY 6 HOURS
Refills: 0 | Status: DISCONTINUED | OUTPATIENT
Start: 2023-11-22 | End: 2023-11-29

## 2023-11-22 RX ORDER — ACETAMINOPHEN 500 MG
650 TABLET ORAL ONCE
Refills: 0 | Status: COMPLETED | OUTPATIENT
Start: 2023-11-22 | End: 2023-11-23

## 2023-11-22 RX ADMIN — SODIUM CHLORIDE 500 MILLILITER(S): 9 INJECTION INTRAMUSCULAR; INTRAVENOUS; SUBCUTANEOUS at 16:44

## 2023-11-22 RX ADMIN — CARBIDOPA AND LEVODOPA 1 TABLET(S): 25; 100 TABLET ORAL at 15:49

## 2023-11-22 RX ADMIN — LEVETIRACETAM 250 MILLIGRAM(S): 250 TABLET, FILM COATED ORAL at 21:33

## 2023-11-22 RX ADMIN — ATORVASTATIN CALCIUM 40 MILLIGRAM(S): 80 TABLET, FILM COATED ORAL at 21:33

## 2023-11-22 RX ADMIN — Medication 650 MILLIGRAM(S): at 20:15

## 2023-11-22 RX ADMIN — GABAPENTIN 100 MILLIGRAM(S): 400 CAPSULE ORAL at 21:32

## 2023-11-22 RX ADMIN — CARBIDOPA AND LEVODOPA 1 TABLET(S): 25; 100 TABLET ORAL at 07:44

## 2023-11-22 RX ADMIN — Medication 3 MILLIGRAM(S): at 21:36

## 2023-11-22 RX ADMIN — CARBIDOPA AND LEVODOPA 1 TABLET(S): 25; 100 TABLET ORAL at 21:33

## 2023-11-22 RX ADMIN — GABAPENTIN 100 MILLIGRAM(S): 400 CAPSULE ORAL at 08:16

## 2023-11-22 RX ADMIN — Medication 650 MILLIGRAM(S): at 07:44

## 2023-11-22 RX ADMIN — SODIUM CHLORIDE 500 MILLILITER(S): 9 INJECTION INTRAMUSCULAR; INTRAVENOUS; SUBCUTANEOUS at 20:14

## 2023-11-22 NOTE — ED ADULT NURSE NOTE - OBJECTIVE STATEMENT
Pt presents to er with complaints of slipping out of bed falling onto his buttocks, complains of right sided groin and knee pain, denies head strike, use of AC, pt A&OX4, states he went to stand and felt his right quad give out under him, pt denies chest pain, sob at this time.

## 2023-11-22 NOTE — ED ADULT TRIAGE NOTE - CHIEF COMPLAINT QUOTE
Pt BIBEMS from home c/o fall. Pt states he was getting up out of bed and his "quadriceps locked up" and felt painful causing him to slide out of bed onto the floor. Denies headstrike, -LOC, -bloodthinners. Pt c/o right leg pain. NKDA.

## 2023-11-22 NOTE — ED PROVIDER NOTE - OBJECTIVE STATEMENT
89-year-old WM, PMH includes syncope, HTN, HLD, CAD s/p CABGx4, GERD, laminectomy regarding L lumbar radiculopathy, Parkinson's type syndrome, BIBA from home complaining of bilateral quadriceps pain since PT session 11/20 causing patient + discomfort & subsequent difficulty to ambulate, this morning had difficulty getting out of bed due to the bilateral quads pain: he slid out of bed onto his buttocks up on the floor and was unable to get up.  Patient reports banged his left elbow during the fall, denies head injury nor LOC.  No ASA/AC meds.  Patient denies pain to the left elbow as well as to hips, back, neck & legs from the fall itself.   patient has 24 hours 6/7 HHA at home with him, she could not help patient up so called EMS.  Patient complains of aching pain moderate of moderate severity bilateral anterior thighs/quads muscles.  Patient denies any other pain or numbness tingling to B/L LEs.   Pt did not take his AM meds yet.  PCP/Cardio: YASMIN Noble 89-year-old WM, PMH includes syncope, HTN, HLD, CAD s/p CABGx4, GERD, laminectomy regarding L lumbar radiculopathy, Parkinson's type syndrome, BIBA from home complaining of bilateral quadriceps pain since PT session 11/20 causing patient + discomfort & subsequent difficulty to ambulate, this morning had difficulty getting out of bed due to the bilateral quads pain: he slid out of bed onto his buttocks upon the floor and was unable to then get up.  Patient reports banged his left elbow during the fall, denies head injury nor LOC.  No ASA/AC meds.  Patient denies pain to the left elbow as well as to hips, back, neck & legs from the fall itself.   Patient has 24/7 HHA care at home with him, she could not help patient up so called EMS.  Patient complains of aching pain, moderate severity bilateral anterior thighs/quads muscles.  Patient denies any other pain nor numbness tingling to B/L LEs.   Pt did not take his AM meds yet.  PCP/Cardio: YASMIN Noble

## 2023-11-22 NOTE — ED ADULT NURSE NOTE - NSFALLRISKINTERV_ED_ALL_ED

## 2023-11-22 NOTE — H&P ADULT - MUSCULOSKELETAL
ROM intact/no joint swelling/no joint erythema/strength 5/5 bilateral upper extremities/decreased strength details…

## 2023-11-22 NOTE — ED PROVIDER NOTE - SKIN, MLM
Skin normal color for race, warm, dry and intact. No evidence of rash.  No tactile warmth.  No external signs of trauma other than superf. abrasion L anterior shin, non-acute as per pt.

## 2023-11-22 NOTE — H&P ADULT - CONVERSATION DETAILS
16 min spent discussing advanced care planning and pt is a full code and states he allows for cpr and intubation trial.

## 2023-11-22 NOTE — ED PROVIDER NOTE - ENMT, MLM
NC/AT.  Airway patent, Nasal mucosa clear. Mouth with normal mucosa. Throat has no vesicles, no oropharyngeal exudates and uvula is midline.

## 2023-11-22 NOTE — H&P ADULT - ASSESSMENT
Pt is a 89 yo male with a pmh/o syncope, CABG, GERD, Parkinson's disease, who lives at home with HHA, who presents to Ed due to difficulty ambulating s/p PT/work out few days ago, resulting in bilateral thigh weakness. Pt slid to floor while trying to get to walker and HHA unable to pick him up. EMS called. Pt admitted due to:    #Debility  #Parkinsons disease  Admit to med/surg  Social work consult  PT consult  OOB and ambulate with assistance  will likely need placement  f/u official XR readings- no acute pathology on prelim per ED   Tylenol prn pain  c/w home meds  f/u AM labs    #Leukocytosis  CXR- no consolidation on prelim, f/u official read  RVP/COVID pending  T99.4 orally  f/u urinalysis  US b/l LE- no dvt  monitor off abx as no source identified, suspect UTI    #Elevated CK  #Thigh pain  Pain present s/p PT/work out on 11/20, likely MSK pain  IVF for gentle hydration  s/p 500cc IVF bolus in ED    #HTN/HLD/CAD  c/w HCTZ  c/w ARB interchange while inpatient  c/w statin  DASH/TLC diet

## 2023-11-22 NOTE — ED ADULT NURSE REASSESSMENT NOTE - NS ED NURSE REASSESS COMMENT FT1
Pt requesting his 100mg Neurontin at this time with his Sinimet,  made aware and states he will place order.

## 2023-11-22 NOTE — ED PROVIDER NOTE - CONSTITUTIONAL, MLM
normal... Elderly WM awake, alert, oriented to person, place, time/situation and in no apparent distress.

## 2023-11-22 NOTE — ED PROVIDER NOTE - NEUROLOGICAL, MLM
Alert and oriented, no focal deficits, no motor or sensory deficits.  Normal speech, CNs grossly normal.  + Mild resting tremors & L foot drop, both, non-acute as per pt.

## 2023-11-22 NOTE — ED ADULT NURSE REASSESSMENT NOTE - NS ED NURSE REASSESS COMMENT FT1
Pt failed ambulation trial, needs maximum assist and almost fell with rolling walker, pt is unable to ambulate alone/stand at this time,  aware.

## 2023-11-22 NOTE — H&P ADULT - TIME BILLING
A minimum of 75 min was spent completing this admission not including time spent discussing advanced care planning or discussing goals of care with a minimum of 36 min spent face to face with patient while in ED unit on admission, counseling patient on current acute on chronic conditions and discussing plan and coordination of care including PT evaluation and possible placement.

## 2023-11-22 NOTE — ED PROVIDER NOTE - CARE PLAN
Principal Discharge DX:	Unable to ambulate  Secondary Diagnosis:	Quadriceps muscle strain  Secondary Diagnosis:	Parkinson's disease   1

## 2023-11-22 NOTE — H&P ADULT - HISTORY OF PRESENT ILLNESS
Pt is a 89 yo male with a pmh/o anxiety, back pain, BPH, CAD s/p stent, RBBB, left foot drop, HLD, HTN, lumbar radiculopathy, lumbar stenosis, melanoma, s/p cholecystectomy, s/p CABG x3, s/p discectomy for herniated nucleus pulposus, syncopal epsiode x1 thought to be vasovagal vs autonomic dysregulation in setting of parkinson's disease, who presents from home due to inability to ambulate on own. Pt states he has a 24/7 HHA who usually helps him get to his walker however today he noticed he was more stiff than usual and that he is not strong enough to take care of himself. Pt states today he did not fall but rather "slid" himself down onto floor. HHA at bedside states was unable to lift him back up from floor so called EMS for help. Pt states he feels "fine" other than "thigh soreness". Pt states he pressed against thighs on way down. In Ed pt failed ambulation trial and family and pt feel unsafe d/c as aide not going to be there tomorrow for holiday.

## 2023-11-22 NOTE — ED PROVIDER NOTE - CLINICAL SUMMARY MEDICAL DECISION MAKING FREE TEXT BOX
89-year-old WM, PMH includes laminectomy regarding L lumbar radiculopathy, Parkinson's type syndrome, BIBA from home complaining of bilateral quadriceps pain since PT session 11/20 causing patient + discomfort & subsequent difficulty to ambulate, this morning had difficulty getting out of bed due to the bilateral quads pain: he slid out of bed onto his buttocks up on the floor and was unable to get up.  No head injury/LOC.  No ASA/AC meds.  Exam: NAD.  LORA x 4.  B/L SLR 30 degrees with 4+/5 motor, + assoc. pain B/L quads mm., no palpable quads m. defect nor swelling/discoloration.    Plan: XR 89-year-old WM, PMH includes laminectomy regarding L lumbar radiculopathy, Parkinson's type syndrome, BIBA from home complaining of bilateral quadriceps pain since PT session 11/20 causing patient + discomfort & subsequent difficulty to ambulate, this morning had difficulty getting out of bed due to the bilateral quads pain: he slid out of bed onto his buttocks up on the floor and was unable to get up.  No head injury/LOC.  No ASA/AC meds.  Exam: NAD.  LORA x 4.  B/L SLR 30 degrees with 4+/5 motor, + assoc. pain B/L quads mm., no palpable quads m. defect nor swelling/discoloration.    Plan: XRs pelvis & B/L hips/femurs, U/S B/L thighs (r/o hematoma/m. tear), po Tylenol & Sinemet.  Consider po m. relaxant.  Observe, reassess, incl. ambulation trial if radio-imaging negative. 89-year-old WM, PMH includes laminectomy regarding L lumbar radiculopathy, Parkinson's type syndrome, BIBA from home complaining of bilateral quadriceps pain since PT session 11/20 causing patient + discomfort & subsequent difficulty to ambulate, this morning had difficulty getting out of bed due to the bilateral quads pain: he slid out of bed onto his buttocks up on the floor and was unable to get up.  No head injury/LOC.  No ASA/AC meds.  Exam: NAD.  LORA x 4.  B/L SLR 30 degrees with 4+/5 motor, + assoc. pain B/L quads mm., no palpable quads m. defect nor swelling/discoloration.    Plan: XRs pelvis & B/L hips/femurs, U/S B/L thighs (r/o hematoma/m. tear), po Tylenol & Sinemet.  Consider po m. relaxant.  Observe, reassess, incl. ambulation trial if radio-imaging negative.    15;35, C MD Daniela:  XRs wet read no fx/disloc.  Sono: negative study.  Pt reports partial sympt. relief s/p meds, but ED RN reports pt FAILED ambulation trial attempt.  SW consult appreciated.  HHA at bedside willing to be with pt at home if wheelchair at home, that cannot be arranged at this time.  pt unsafe for DC home at this point.  Dr. Harris aware of Med admission. 89-year-old WM, PMH includes laminectomy regarding L lumbar radiculopathy, Parkinson's type syndrome, BIBA from home complaining of bilateral quadriceps pain since PT session 11/20 causing patient + discomfort & subsequent difficulty to ambulate, this morning had difficulty getting out of bed due to the bilateral quads pain: he slid out of bed onto his buttocks up on the floor and was unable to get up.  No head injury/LOC.  No ASA/AC meds.  Exam: NAD.  LORA x 4.  B/L SLR 30 degrees with 4+/5 motor, + assoc. pain B/L quads mm., no palpable quads m. defect nor swelling/discoloration.    Plan: XRs pelvis & B/L hips/femurs, U/S B/L thighs (r/o hematoma/m. tear), po Tylenol & Sinemet.  Consider po m. relaxant.  Observe, reassess, incl. ambulation trial if radio-imaging negative.    15:35, C MD Daniela:  XRs wet read no fx/disloc.  Sono: negative study.  Pt reports partial sympt. relief s/p meds, but ED RN reports pt FAILED ambulation trial attempt.  SW consult appreciated.  HHA at bedside willing to be with pt at home if wheelchair at home, but that cannot be arranged at this time.  Pt unsafe for DC home at this point.  Dr. Harris aware of Med admission.

## 2023-11-22 NOTE — ED ADULT NURSE NOTE - NSFALLASSESSNEED_ED_ALL_ED
no 89 yo female w hx of liver cancer, presents w vomiting, unable to eat or drink.  Pt is complaining of constipation and abdominal pain.  Unable to have bm in 2 days.  Appears jaundiced.  + abdominal distention.  Hx of liver cancer 3 years.  PE lungs cta b/l abd + jaundiced. + distention+ diffuse abd pain Neuro nonfocal exam Impression Rule out obstruction.

## 2023-11-22 NOTE — CHART NOTE - NSCHARTNOTEFT_GEN_A_CORE
BENNETT met with pt and spoke to pt's dtr Liberty  , who lives in [place] , about role and community resources. Pt live at home with a private 7x24 aide. Dtr reports current aide is going home for Holiday, but another aide will replace her to be with pt at home. Pt admitted with a fall. dtr reports pt has been falling more at home and needs more assistance with ADLs. MD reports pt is scheduled for an ambulation trial. Dtr considering [facility] vs home with private aide. Dtr wants to speak with MD ( as she reports he has not yet ) and see how her dad does on the ambulation trial before she can make a decision of [facility] vs home with aide. BENNETT informed CM of above. SW to monitor pt's medical status and remain available as needed to assist in the dcp needs of pt
As per dtr, if pt returns home, he will need an ambulance arranged for him

## 2023-11-22 NOTE — H&P ADULT - REASON FOR ADMISSION
Pt's right ear irrigated with warm H20 and H2O2-moderate amt cerumen flushed from ear; TM intact without erythema.  Pt tolerated procedure well Frequent falls, debility

## 2023-11-22 NOTE — ED PROVIDER NOTE - MUSCULOSKELETAL, MLM
Neck: NT, supple w/o pain.  back, pelvis: NT & stable.  LORA x 4.  B/L SLR 30 degrees with 4+/5 motor, + assoc. pain B/L quads mm., no palpable quads m. defect nor swelling/discoloration.  B/L hips: NT, no swelling.  L elbow: NT, no effusion, + AFROM w/o pain. Neck: NT, supple w/o pain.  Back, pelvis: NT & stable.  LORA x 4.  B/L SLR 30 degrees with 4+/5 motor, + assoc. pain B/L quads mm., no palpable quads m. defect nor swelling/discoloration.  B/L hips: NT, no swelling.  L elbow: NT, no effusion, + AFROM w/o pain.

## 2023-11-22 NOTE — ED ADULT TRIAGE NOTE - ARRIVAL FROM
Patient ID: Kailey Ramirez, 1959, [id number], 61 y.o. Referred by : Rory Mcdonald MD  Reason for consultation:   Prostate cancer diagnosed in March 2021, initial PSA 5.1, HIghest Gaffney Grade: 4+3=7, Clinical stage is: Stage IIc, cT1cNo MO, NCCN clinical risk group is: Unfavorable intermediate risk    Urologist Dr. Radha Bruner    Patient has been started on 6 monthly Lupron injection in July 2021 while awaiting initiation of radiation therapy which was significantly delayed secondary to his hip infection  Now his radiation therapy started on 5/9/2022 and radiation therapy completed on 6/20/2022  HISTORY OF PRESENT ILLNESS:    Oncologic History:  Kailey Ramirez is a 61 y.o. AA male was seen during his consultation visit for diagnosis of prostate cancer. Patient was diagnosed with prostate cancer in March 2021 when his initial PSA was elevated at 5.11. 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 Patient was referred to radiation oncology for consideration of radiation therapy. He had a bone scan and CT abdomen pelvis at that that really did not show any nephrolithiasis or distant metastatic disease. He was seen by [facility] and external beam radiation therapy was recommended and he did get fiducial spacer placement in August 2021. Patient has had right hip replacement and had been following with orthopedic surgery for hip infection. He had right total hip arthroplasty revision on 10/18/2021. Therefore his radiation therapy was delayed. In the interim patient was started on Lupron injection in July 2021. He has received 2 injections so far. Patient has been following with infectious disease for his infection and has been on antibiotics. He had a debridement and VAC placement and had another surgery in December 2021. He reports that his most recent surgery was in January of this year.   His most recent PSA in February was 1.8. Interval history:  Patient is returning for follow-up visit and to discuss lab results and further recommendations. Clinically he is doing well and denies any chest pain shortness of breath. He denies any new bone pain back pain. He denies any unintentional weight loss, drenching night sweats fever chills. His PSA continues to decrease. During this visit patient's allergy, social, medical, surgical history and medications were reviewed and updated.       Past Medical History:   Diagnosis Date    Arthritis     CAD (coronary artery disease)     Cancer ([facility] [address]     prostate    Cervical radiculopathy     CHF (congestive heart failure) ([facility])     DM (diabetes mellitus) ([facility] [address] 2009    IDDM    GERD (gastroesophageal reflux disease) 2017    ON RX    Heart murmur 2013    ASYMPTOMATIC    HTN (hypertension) 06/29/2012    ON RX    Hyperlipidemia 06/29/2012    ON RX    Sleep apnea 2016    TRIED MACHINE COULD NOT TOLERATE    Vision abnormalities 2019    FLOATERS RIGHT EYE    Wears glasses        Past Surgical History:   Procedure Laterality Date    COLONOSCOPY  07/23/2010    Dr. River Bird Bilateral 2017    CATARACT EXTRACTION WITH IOL    IR INS PICC VAD W SQ PORT GREATER THAN 5  10/7/2022    IR INS PICC VAD W SQ PORT GREATER THAN 5 10/7/2022 [facility] PROCEDURES    KNEE ARTHROSCOPY      REVISION TOTAL HIP ARTHROPLASTY Right 10/5/2022    RIGHT HIP EXCISIONAL DEBRIDEMENT SKIN TO SUBCUTANEOUS TISSUE AND FASCIA AND IRRIGATION performed by Justin Jones DO at [facility] Right 03/28/2019    VITRECTOMY Right 3/28/2019    VITRECTOMY 25 GAUGE,  IOL REPOSITION  (Candis Guard) performed by Maddi Jacques MD at [address]   Allergen Reactions    Lisinopril Swelling     Outer Neck swelling    Melatonin Swelling    Trazodone Swelling     Chest swells       Current Outpatient Medications   Medication Sig Dispense Refill    venlafaxine (EFFEXOR XR) 37.5 MG extended release capsule TAKE 1 CAPSULE BY MOUTH ONCE DAILY. 30 capsule 3    ciprofloxacin (CIPRO) 500 MG tablet Take 1 tablet by mouth 2 times daily 60 tablet 2    amoxicillin (AMOXIL) 500 MG capsule Take 1 capsule by mouth 2 times daily 14 capsule 0    apixaban (ELIQUIS) 2.5 MG TABS tablet Take 1 tablet by mouth 2 times daily 70 tablet 0    TRULICITY 3 PU/4.2DP SOPN INJECT THE CONTENTS OF ONE SYRINGE (3 MG) UNDER THE SKIN ONCE WEEKLY 2 mL 0    metoprolol tartrate (LOPRESSOR) 25 MG tablet TAKE ONE-HALF TABLET BY MOUTH TWICE DAILY. 30 tablet 2    JARDIANCE 25 MG tablet TAKE 1 TABLET BY MOUTH ONCE DAILY. 30 tablet 1    pregabalin (LYRICA) 100 MG capsule TAKE 1 CAPSULE BY MOUTH TWICE DAILY. 60 capsule 2    metFORMIN (GLUCOPHAGE) 500 MG tablet TAKE TWO TABLETS BY MOUTH TWICE A DAY WITH MEALS 60 tablet 3    ketoconazole (NIZORAL) 2 % shampoo Use as wash to chest, back, neck, face, and scalp leaving on for 5 minutes prior to washing off once daily for 2 weeks then three times weekly 120 mL 3    ketoconazole (NIZORAL) 2 % cream Apply daily to affected areas on face, scalp, neck, and chest 30 g 2    atorvastatin (LIPITOR) 40 MG tablet Take 1 tablet by mouth daily 30 tablet 3    tamsulosin (FLOMAX) 0.4 mg capsule Take 1 capsule by mouth daily 90 capsule 1    LANTUS SOLOSTAR 100 UNIT/ML injection pen INJECT 42 UNITS INTO THE SKIN TWO TIMES A DAY 15 mL 3    Continuous Blood Gluc Transmit (DEXCOM G6 TRANSMITTER) MISC Use in combination with new sensor every 10 days.  Transmitter lasts 3 months 1 each 3    Continuous Blood Gluc Sensor (DEXCOM G6 SENSOR) MISC Apply new sensor to abdomen every 10 days 3 each 11    Continuous Blood Gluc  (DEXCOM G6 ) [facility] Use as directed to monitor glucose continuously 1 each 1    ferrous sulfate (IRON 325) 325 (65 Fe) MG tablet Take 325 mg by mouth daily (with breakfast)       zinc 50 MG CAPS Take 1 capsule by mouth daily      aspirin EC 81 MG EC tablet Take 1 tablet by mouth daily 90 tablet 1    Insulin Pen Needle (B-D ULTRAFINE III SHORT PEN) 31G X 8 MM MISC 1 each by Does not apply route daily 100 each 3    vitamin D3 (CHOLECALCIFEROL) 25 MCG (1000 UT) TABS tablet Take 1 tablet by mouth daily 90 tablet 1    insulin lispro, 1 Unit Dial, (HUMALOG KWIKPEN) 100 UNIT/ML SOPN Inject 6 Units into the skin 3 times daily (before meals) 3 pen 2    Misc.  Devices MISC 1 PAIR OF DIABETIC SHOES (1 LEFT/ 1 RIGHT)  1-3 PAIRS OF INSERTS (LEFT/ RIGHT) (Patient not taking: Reported on 2022) 2 each 0    ibuprofen (ADVIL;MOTRIN) 400 MG tablet Take 1 tablet by mouth every 6 hours as needed for Pain (Patient not taking: Reported on 2022) 120 tablet 1     Current Facility-Administered Medications   Medication Dose Route Frequency Provider Last Rate Last Admin    bupivacaine (MARCAINE) 0.25 % injection 5 mg  2 mL Intra-artICUlar Once Justin Jones DO        methylPREDNISolone acetate (DEPO-MEDROL) injection 80 mg  80 mg Intra-artICUlar Once Justin Jones,            Social History     Socioeconomic History    Marital status: Single     Spouse name: Not on file    Number of children: Not on file    Years of education: Not on file    Highest education level: Not on file   Occupational History    Not on file   Tobacco Use    Smoking status: Former     Packs/day: 0.00     Years: 15.00     Pack years: 0.00     Types: Cigarettes     Quit date: 3/27/2016     Years since quittin.6    Smokeless tobacco: Never   Vaping Use    Vaping Use: Never used   Substance and Sexual Activity    Alcohol use: Yes     Comment: h/o ETOH-he reports he isn't currenty drinking as much    Drug use: No    Sexual activity: Yes   Other Topics Concern    Not on file   Social History Narrative    Not on file     Social Determinants of Health     Financial Resource Strain: Low Risk     Difficulty of Paying Living Expenses: Not hard at all   Food Insecurity: No Food Insecurity    Worried About Running Out of Food in the Last Year: Never true    Ran Out of Food in the Last Year: Never true   Transportation Needs: Not on file   Physical Activity: Inactive    Days of Exercise per Week: 0 days    Minutes of Exercise per Session: 0 min   Stress: Not on file   Social Connections: Not on file   Intimate Partner Violence: Not on file   Housing Stability: Not on file       Family History   Problem Relation Age of Onset    Diabetes Sister     Diabetes Brother     Cancer Brother         REVIEW OF SYSTEM:     Constitutional: No fever or chills. No night sweats, no weight loss   Eyes: No eye discharge, double vision, or eye pain   HEENT: negative for sore mouth, sore throat, hoarseness and voice change   Respiratory: negative for cough , sputum, dyspnea, wheezing, hemoptysis, chest pain   Cardiovascular: negative for chest pain, dyspnea, palpitations, orthopnea, PND   Gastrointestinal: negative for nausea, vomiting, diarrhea, constipation, abdominal pain, Dysphagia, hematemesis and hematochezia   Genitourinary: negative for frequency, dysuria, nocturia, urinary incontinence, and hematuria   Integument: negative for rash, skin lesions, bruises.    Hematologic/Lymphatic: negative for easy bruising, bleeding, lymphadenopathy, petechiae and swelling/edema   Endocrine: negative for heat or cold intolerance, tremor, weight changes, change in bowel habits and hair loss   Musculoskeletal: negative for myalgias, arthralgias, pain, joint swelling,and bone pain   Neurological: negative for headaches, dizziness, seizures, weakness, numbness       OBJECTIVE:         Vitals:    11/30/22 1235   BP: 128/85   Pulse: 87   Resp: 16   Temp: 97.3 °F (36.3 °C)       PHYSICAL EXAM:   General appearance - well appearing, no in pain or distress   Mental status - alert and cooperative   Eyes - pupils equal and reactive, extraocular eye movements intact   Ears - bilateral TM's and external ear canals normal   Mouth - mucous membranes moist, pharynx normal without lesions   Neck - supple, no significant adenopathy   Lymphatics - no palpable lymphadenopathy, no hepatosplenomegaly   Chest - clear to auscultation, no wheezes, rales or rhonchi, symmetric air entry   Heart - normal rate, regular rhythm, normal S1, S2, no murmurs, rubs, clicks or gallops   Abdomen - soft, nontender, nondistended, no masses or organomegaly   Neurological - alert, oriented, normal speech, no focal findings or movement disorder noted   Musculoskeletal - no joint tenderness, deformity or swelling   Extremities - peripheral pulses normal, no pedal edema, no clubbing or cyanosis   Skin - normal coloration and turgor, no rashes, no suspicious skin lesions noted ,      LABORATORY DATA:     Lab Results   Component Value Date    WBC 4.9 2022    HGB 13.0 2022    HCT 42.8 2022    MCV 85.1 2022     2022    LYMPHOPCT 26 2022    RBC 5.03 2022    MCH 25.8 2022    MCHC 30.4 2022    RDW 16.5 (H) 2022    MONOPCT 16 (H) 2022    BASOPCT 1 2022    NEUTROABS 2.60 2022    LYMPHSABS 1.28 2022    MONOSABS 0.80 2022    EOSABS 0.17 2022    BASOSABS 0.05 2022         Chemistry        Component Value Date/Time     2022 1430    K 4.4 2022 1430     2022 1430    CO2 23 2022 1430    BUN 14 2022 1430    CREATININE 1.22 (H) 2022 1430        Component Value Date/Time    CALCIUM 9.7 2022 1430    ALKPHOS 151 (H) 2022 1430    AST 20 2022 1430    ALT 18 2022 1430    BILITOT 0.4 2022 1430            PATHOLOGY DATA:   Patient Name: Aston Soto : 1959 (Age: 64) Gender: M Taken: 3/30/2021 Reported: 2021 Physician(s): Rashida Vincent MD ([phone]) Copy To: Accession #: [id number] [facility]. Rec. #: [id number] Acct: # [de-identified]   Final Pathologic Diagnosis   1. Prostate, LLA, core biopsy:        Prostatic tissue, no tumor present. 2. Posterior the, LLB, core biopsy:        Prostatic tissue, no tumor present. 3. Prostate, LLM, core biopsy:        Prostatic tissue, no tumor present. 4. Prostate, LB, core biopsy:        Prostatic tissue, no tumor present. 5. Prostate, LM, core biopsy:        Prostatic tissue, no tumor present. 6. Prostate, LA, core biopsy:        Prostatic tissue, no tumor present. 7. Prostate, RB, core biopsy:        Prostatic tissue, no tumor present. 8. Posterior the, RM, core biopsy:        Prostatic tissue, no tumor present. 9. Prostate, RA, core biopsy:        Prostatic tissue, no tumor present. 10. Prostate, RLB, core biopsy:        Prostatic adenocarcinoma, [place] score 3+3 = 6 (grade group 1), involving one of three tissue core fragments. Proportion of prostatic tissue involved by tumor: 4%. Total linear millimeters of the carcinoma: 1.0 mm. Total linear millimeters of needle core tissue:28.0 mm. 11. Prostate, RLM, core biopsy:        Prostatic adenocarcinoma, [place] score 4+3 = 7 (grade group 3), involving one of three tissue core fragments. Proportion of prostatic tissue involved by tumor: 21%. Total linear millimeters of the carcinoma: 3.0 mm. Total linear millimeters of needle core tissue:14.0 mm. 12. Prostate, RLA, core biopsy:        Prostatic tissue, no tumor present. Comment:  In order to confirm invasion, immunoperoxidase stains for p63 and K903 were performed 0n 10A and 11A with adequate control.  The stains are negative in concerning glands, supporting the interpretation        IMAGING DATA:      XR CHEST PORTABLE  Narrative: EXAMINATION:  ONE XRAY VIEW OF THE CHEST; US GUIDE NEEDLE PLACEMENT    10/31/2022 1:19 pm    COMPARISON:  October 7, 2022    HISTORY:  ORDERING SYSTEM PROVIDED HISTORY: PICC line placement; dysfunctional PICC line  TECHNOLOGIST PROVIDED HISTORY:  picc line placement  Reason for Exam: Picc line placement, AP UPRIGHT PORTABLE    FINDINGS:  AP portable chest radiograph obtained prior to PICC line exchange 2 ensure  appropriate position. Repeat chest radiograph after catheter exchange  demonstrates satisfactory right upper extremity PICC placement terminating in  the right atrium. Moderate cardiomegaly appears unchanged. Lungs and costophrenic sulci are  clear. No pneumothorax or subdiaphragmatic free air. Reversed total  shoulder arthroplasty redemonstrated on the right. Impression: Successful exchange of the right upper extremity PICC. The catheter is ready  for use. IR US GUIDED NEEDLE PLACEMENT  Narrative: EXAMINATION:  ONE XRAY VIEW OF THE CHEST; US GUIDE NEEDLE PLACEMENT    10/31/2022 1:19 pm    COMPARISON:  October 7, 2022    HISTORY:  ORDERING SYSTEM PROVIDED HISTORY: PICC line placement; dysfunctional PICC line  TECHNOLOGIST PROVIDED HISTORY:  picc line placement  Reason for Exam: Picc line placement, AP UPRIGHT PORTABLE    FINDINGS:  AP portable chest radiograph obtained prior to PICC line exchange 2 ensure  appropriate position. Repeat chest radiograph after catheter exchange  demonstrates satisfactory right upper extremity PICC placement terminating in  the right atrium. Moderate cardiomegaly appears unchanged. Lungs and costophrenic sulci are  clear. No pneumothorax or subdiaphragmatic free air. Reversed total  shoulder arthroplasty redemonstrated on the right. Impression: Successful exchange of the right upper extremity PICC. The catheter is ready  for use.   XR CHEST PORTABLE  Narrative: EXAMINATION:  ONE XRAY VIEW OF THE CHEST; US GUIDE NEEDLE PLACEMENT    10/31/2022 1:19 pm    COMPARISON:  October 7, 2022    HISTORY:  ORDERING SYSTEM PROVIDED HISTORY: PICC line placement; dysfunctional PICC line  TECHNOLOGIST PROVIDED HISTORY:  picc line placement  Reason for Exam: Picc line placement, AP UPRIGHT PORTABLE    FINDINGS:  AP portable chest radiograph obtained prior to PICC line exchange 2 ensure  appropriate position. Repeat chest radiograph after catheter exchange  demonstrates satisfactory right upper extremity PICC placement terminating in  the right atrium. Moderate cardiomegaly appears unchanged. Lungs and costophrenic sulci are  clear. No pneumothorax or subdiaphragmatic free air. Reversed total  shoulder arthroplasty redemonstrated on the right. Impression: Successful exchange of the right upper extremity PICC. The catheter is ready  for use. ASSESSMENT:    Malcom Anders is a 61 y.o. male with a diagnosis ofAA unfavorable intermediate risk prostate cancer, clinical stage T1 cN0 M0, [place] 4+3 + 7, initial PSA 5.11.,  Diagnosed in March 2021  I reviewed laboratory, outside records, prior records, discussed diagnosis, prognosis treatment recommendations. I reviewed the significance and goals of care. Patient did not want surgery and wanted radiation therapy at that time and was evaluated by radiation oncology at [facility].  He was recommended definitive external beam radiation therapy along with androgen suppression therapy and has received 2 doses of Lupron shot 45 mg starting in July 2021. Patient received placement of fiducial marker, SpaceOAR on 8/13/2021. Patient had right hip revision on 10/18/21 for his ongoing hip prosthesis infection therefore his radiation was delayed. Now he has completed radiation therapy on 6/20/2022. During today's visit, the patient and the family had a number of reasonable questions which were answered to their satisfaction. They verbalized understanding of the information provided and they agreed to proceed as outlined above. PLAN:   I reviewed recent lab work, discussed diagnosis and treatment recommendations   His PSA continue to decrease   Clinically is doing well   Return to clinic in 4 months with labs prior       Dedrick Durham MD  Hematologist/Medical Oncologist    On this date 11/30/22  I have spent 36 minutes reviewing previous notes, test results and face to face with the patient discussing the diagnosis and importance of compliance with the treatment plan. Greater than 50% of that time was spent face-to-face with the patient in counseling and coordinating her care. This note is created with the assistance of a speech recognition program.  While intending to generate a document that actually reflects the content of the visit, the document can still have some errors including those of syntax and sound a like substitutions which may escape proof reading. It such instances, actual meaning can be extrapolated by contextual diversion. Home

## 2023-11-23 LAB
ALBUMIN SERPL ELPH-MCNC: 2.8 G/DL — LOW (ref 3.3–5)
ALBUMIN SERPL ELPH-MCNC: 2.8 G/DL — LOW (ref 3.3–5)
ALP SERPL-CCNC: 75 U/L — SIGNIFICANT CHANGE UP (ref 40–120)
ALP SERPL-CCNC: 75 U/L — SIGNIFICANT CHANGE UP (ref 40–120)
ALT FLD-CCNC: 8 U/L — LOW (ref 12–78)
ALT FLD-CCNC: 8 U/L — LOW (ref 12–78)
ANION GAP SERPL CALC-SCNC: 5 MMOL/L — SIGNIFICANT CHANGE UP (ref 5–17)
ANION GAP SERPL CALC-SCNC: 5 MMOL/L — SIGNIFICANT CHANGE UP (ref 5–17)
APTT BLD: 29.4 SEC — SIGNIFICANT CHANGE UP (ref 24.5–35.6)
APTT BLD: 29.4 SEC — SIGNIFICANT CHANGE UP (ref 24.5–35.6)
AST SERPL-CCNC: 38 U/L — HIGH (ref 15–37)
AST SERPL-CCNC: 38 U/L — HIGH (ref 15–37)
BASOPHILS # BLD AUTO: 0.04 K/UL — SIGNIFICANT CHANGE UP (ref 0–0.2)
BASOPHILS # BLD AUTO: 0.04 K/UL — SIGNIFICANT CHANGE UP (ref 0–0.2)
BASOPHILS NFR BLD AUTO: 0.3 % — SIGNIFICANT CHANGE UP (ref 0–2)
BASOPHILS NFR BLD AUTO: 0.3 % — SIGNIFICANT CHANGE UP (ref 0–2)
BILIRUB SERPL-MCNC: 0.7 MG/DL — SIGNIFICANT CHANGE UP (ref 0.2–1.2)
BILIRUB SERPL-MCNC: 0.7 MG/DL — SIGNIFICANT CHANGE UP (ref 0.2–1.2)
BUN SERPL-MCNC: 24 MG/DL — HIGH (ref 7–23)
BUN SERPL-MCNC: 24 MG/DL — HIGH (ref 7–23)
CALCIUM SERPL-MCNC: 8.8 MG/DL — SIGNIFICANT CHANGE UP (ref 8.5–10.1)
CALCIUM SERPL-MCNC: 8.8 MG/DL — SIGNIFICANT CHANGE UP (ref 8.5–10.1)
CHLORIDE SERPL-SCNC: 110 MMOL/L — HIGH (ref 96–108)
CHLORIDE SERPL-SCNC: 110 MMOL/L — HIGH (ref 96–108)
CK SERPL-CCNC: 398 U/L — HIGH (ref 26–308)
CK SERPL-CCNC: 398 U/L — HIGH (ref 26–308)
CO2 SERPL-SCNC: 27 MMOL/L — SIGNIFICANT CHANGE UP (ref 22–31)
CO2 SERPL-SCNC: 27 MMOL/L — SIGNIFICANT CHANGE UP (ref 22–31)
CREAT SERPL-MCNC: 0.94 MG/DL — SIGNIFICANT CHANGE UP (ref 0.5–1.3)
CREAT SERPL-MCNC: 0.94 MG/DL — SIGNIFICANT CHANGE UP (ref 0.5–1.3)
EGFR: 77 ML/MIN/1.73M2 — SIGNIFICANT CHANGE UP
EGFR: 77 ML/MIN/1.73M2 — SIGNIFICANT CHANGE UP
EOSINOPHIL # BLD AUTO: 0.07 K/UL — SIGNIFICANT CHANGE UP (ref 0–0.5)
EOSINOPHIL # BLD AUTO: 0.07 K/UL — SIGNIFICANT CHANGE UP (ref 0–0.5)
EOSINOPHIL NFR BLD AUTO: 0.5 % — SIGNIFICANT CHANGE UP (ref 0–6)
EOSINOPHIL NFR BLD AUTO: 0.5 % — SIGNIFICANT CHANGE UP (ref 0–6)
GLUCOSE SERPL-MCNC: 102 MG/DL — HIGH (ref 70–99)
GLUCOSE SERPL-MCNC: 102 MG/DL — HIGH (ref 70–99)
HCT VFR BLD CALC: 35.5 % — LOW (ref 39–50)
HCT VFR BLD CALC: 35.5 % — LOW (ref 39–50)
HGB BLD-MCNC: 11.9 G/DL — LOW (ref 13–17)
HGB BLD-MCNC: 11.9 G/DL — LOW (ref 13–17)
IMM GRANULOCYTES NFR BLD AUTO: 0.5 % — SIGNIFICANT CHANGE UP (ref 0–0.9)
IMM GRANULOCYTES NFR BLD AUTO: 0.5 % — SIGNIFICANT CHANGE UP (ref 0–0.9)
INR BLD: 1.07 RATIO — SIGNIFICANT CHANGE UP (ref 0.85–1.18)
INR BLD: 1.07 RATIO — SIGNIFICANT CHANGE UP (ref 0.85–1.18)
LYMPHOCYTES # BLD AUTO: 1.91 K/UL — SIGNIFICANT CHANGE UP (ref 1–3.3)
LYMPHOCYTES # BLD AUTO: 1.91 K/UL — SIGNIFICANT CHANGE UP (ref 1–3.3)
LYMPHOCYTES # BLD AUTO: 14.6 % — SIGNIFICANT CHANGE UP (ref 13–44)
LYMPHOCYTES # BLD AUTO: 14.6 % — SIGNIFICANT CHANGE UP (ref 13–44)
MCHC RBC-ENTMCNC: 30.8 PG — SIGNIFICANT CHANGE UP (ref 27–34)
MCHC RBC-ENTMCNC: 30.8 PG — SIGNIFICANT CHANGE UP (ref 27–34)
MCHC RBC-ENTMCNC: 33.5 GM/DL — SIGNIFICANT CHANGE UP (ref 32–36)
MCHC RBC-ENTMCNC: 33.5 GM/DL — SIGNIFICANT CHANGE UP (ref 32–36)
MCV RBC AUTO: 92 FL — SIGNIFICANT CHANGE UP (ref 80–100)
MCV RBC AUTO: 92 FL — SIGNIFICANT CHANGE UP (ref 80–100)
MONOCYTES # BLD AUTO: 0.9 K/UL — SIGNIFICANT CHANGE UP (ref 0–0.9)
MONOCYTES # BLD AUTO: 0.9 K/UL — SIGNIFICANT CHANGE UP (ref 0–0.9)
MONOCYTES NFR BLD AUTO: 6.9 % — SIGNIFICANT CHANGE UP (ref 2–14)
MONOCYTES NFR BLD AUTO: 6.9 % — SIGNIFICANT CHANGE UP (ref 2–14)
NEUTROPHILS # BLD AUTO: 10.13 K/UL — HIGH (ref 1.8–7.4)
NEUTROPHILS # BLD AUTO: 10.13 K/UL — HIGH (ref 1.8–7.4)
NEUTROPHILS NFR BLD AUTO: 77.2 % — HIGH (ref 43–77)
NEUTROPHILS NFR BLD AUTO: 77.2 % — HIGH (ref 43–77)
PLATELET # BLD AUTO: 139 K/UL — LOW (ref 150–400)
PLATELET # BLD AUTO: 139 K/UL — LOW (ref 150–400)
POTASSIUM SERPL-MCNC: 3.5 MMOL/L — SIGNIFICANT CHANGE UP (ref 3.5–5.3)
POTASSIUM SERPL-MCNC: 3.5 MMOL/L — SIGNIFICANT CHANGE UP (ref 3.5–5.3)
POTASSIUM SERPL-SCNC: 3.5 MMOL/L — SIGNIFICANT CHANGE UP (ref 3.5–5.3)
POTASSIUM SERPL-SCNC: 3.5 MMOL/L — SIGNIFICANT CHANGE UP (ref 3.5–5.3)
PROT SERPL-MCNC: 5.8 GM/DL — LOW (ref 6–8.3)
PROT SERPL-MCNC: 5.8 GM/DL — LOW (ref 6–8.3)
PROTHROM AB SERPL-ACNC: 12.1 SEC — SIGNIFICANT CHANGE UP (ref 9.5–13)
PROTHROM AB SERPL-ACNC: 12.1 SEC — SIGNIFICANT CHANGE UP (ref 9.5–13)
RBC # BLD: 3.86 M/UL — LOW (ref 4.2–5.8)
RBC # BLD: 3.86 M/UL — LOW (ref 4.2–5.8)
RBC # FLD: 12.3 % — SIGNIFICANT CHANGE UP (ref 10.3–14.5)
RBC # FLD: 12.3 % — SIGNIFICANT CHANGE UP (ref 10.3–14.5)
SODIUM SERPL-SCNC: 142 MMOL/L — SIGNIFICANT CHANGE UP (ref 135–145)
SODIUM SERPL-SCNC: 142 MMOL/L — SIGNIFICANT CHANGE UP (ref 135–145)
WBC # BLD: 13.11 K/UL — HIGH (ref 3.8–10.5)
WBC # BLD: 13.11 K/UL — HIGH (ref 3.8–10.5)
WBC # FLD AUTO: 13.11 K/UL — HIGH (ref 3.8–10.5)
WBC # FLD AUTO: 13.11 K/UL — HIGH (ref 3.8–10.5)

## 2023-11-23 PROCEDURE — 99223 1ST HOSP IP/OBS HIGH 75: CPT

## 2023-11-23 PROCEDURE — 99233 SBSQ HOSP IP/OBS HIGH 50: CPT

## 2023-11-23 RX ORDER — THIAMINE MONONITRATE (VIT B1) 100 MG
100 TABLET ORAL DAILY
Refills: 0 | Status: DISCONTINUED | OUTPATIENT
Start: 2023-11-23 | End: 2023-11-29

## 2023-11-23 RX ORDER — GABAPENTIN 400 MG/1
100 CAPSULE ORAL
Refills: 0 | Status: DISCONTINUED | OUTPATIENT
Start: 2023-11-23 | End: 2023-11-29

## 2023-11-23 RX ORDER — INFLUENZA VIRUS VACCINE 15; 15; 15; 15 UG/.5ML; UG/.5ML; UG/.5ML; UG/.5ML
0.7 SUSPENSION INTRAMUSCULAR ONCE
Refills: 0 | Status: DISCONTINUED | OUTPATIENT
Start: 2023-11-23 | End: 2023-11-29

## 2023-11-23 RX ORDER — POLYETHYLENE GLYCOL 3350 17 G/17G
17 POWDER, FOR SOLUTION ORAL DAILY
Refills: 0 | Status: DISCONTINUED | OUTPATIENT
Start: 2023-11-23 | End: 2023-11-29

## 2023-11-23 RX ORDER — GABAPENTIN 400 MG/1
200 CAPSULE ORAL
Refills: 0 | Status: DISCONTINUED | OUTPATIENT
Start: 2023-11-23 | End: 2023-11-29

## 2023-11-23 RX ORDER — CARBIDOPA AND LEVODOPA 25; 100 MG/1; MG/1
1 TABLET ORAL
Refills: 0 | Status: DISCONTINUED | OUTPATIENT
Start: 2023-11-23 | End: 2023-11-29

## 2023-11-23 RX ORDER — MULTIVIT-MIN/FERROUS GLUCONATE 9 MG/15 ML
1 LIQUID (ML) ORAL DAILY
Refills: 0 | Status: DISCONTINUED | OUTPATIENT
Start: 2023-11-23 | End: 2023-11-29

## 2023-11-23 RX ORDER — ENOXAPARIN SODIUM 100 MG/ML
40 INJECTION SUBCUTANEOUS EVERY 24 HOURS
Refills: 0 | Status: DISCONTINUED | OUTPATIENT
Start: 2023-11-23 | End: 2023-11-29

## 2023-11-23 RX ORDER — SENNA PLUS 8.6 MG/1
2 TABLET ORAL AT BEDTIME
Refills: 0 | Status: DISCONTINUED | OUTPATIENT
Start: 2023-11-23 | End: 2023-11-29

## 2023-11-23 RX ADMIN — POLYETHYLENE GLYCOL 3350 17 GRAM(S): 17 POWDER, FOR SOLUTION ORAL at 14:00

## 2023-11-23 RX ADMIN — Medication 650 MILLIGRAM(S): at 03:43

## 2023-11-23 RX ADMIN — GABAPENTIN 100 MILLIGRAM(S): 400 CAPSULE ORAL at 05:40

## 2023-11-23 RX ADMIN — CARBIDOPA AND LEVODOPA 1 TABLET(S): 25; 100 TABLET ORAL at 05:41

## 2023-11-23 RX ADMIN — LOSARTAN POTASSIUM 25 MILLIGRAM(S): 100 TABLET, FILM COATED ORAL at 09:27

## 2023-11-23 RX ADMIN — LEVETIRACETAM 250 MILLIGRAM(S): 250 TABLET, FILM COATED ORAL at 05:41

## 2023-11-23 RX ADMIN — ATORVASTATIN CALCIUM 40 MILLIGRAM(S): 80 TABLET, FILM COATED ORAL at 22:11

## 2023-11-23 RX ADMIN — Medication 100 MILLIGRAM(S): at 13:47

## 2023-11-23 RX ADMIN — LEVETIRACETAM 250 MILLIGRAM(S): 250 TABLET, FILM COATED ORAL at 13:46

## 2023-11-23 RX ADMIN — CARBIDOPA AND LEVODOPA 1 TABLET(S): 25; 100 TABLET ORAL at 13:46

## 2023-11-23 RX ADMIN — CARBIDOPA AND LEVODOPA 1 TABLET(S): 25; 100 TABLET ORAL at 18:48

## 2023-11-23 RX ADMIN — ENOXAPARIN SODIUM 40 MILLIGRAM(S): 100 INJECTION SUBCUTANEOUS at 13:46

## 2023-11-23 RX ADMIN — GABAPENTIN 200 MILLIGRAM(S): 400 CAPSULE ORAL at 22:12

## 2023-11-23 RX ADMIN — GABAPENTIN 100 MILLIGRAM(S): 400 CAPSULE ORAL at 13:46

## 2023-11-23 RX ADMIN — LEVETIRACETAM 250 MILLIGRAM(S): 250 TABLET, FILM COATED ORAL at 22:11

## 2023-11-23 NOTE — DIETITIAN INITIAL EVALUATION ADULT - NSFNSGIIOFT_GEN_A_CORE
I&O's Detail    22 Nov 2023 07:01  -  23 Nov 2023 07:00  --------------------------------------------------------  IN:  Total IN: 0 mL    OUT:    Voided (mL): 100 mL  Total OUT: 100 mL    Total NET: -100 mL

## 2023-11-23 NOTE — DIETITIAN INITIAL EVALUATION ADULT - NSFNSPHYEXAMSKINFT_GEN_A_CORE
Wilfrido score = 13 ecchymotic redness non-blanchable skin   Pressure Injury 1: Left:, buttocks, Stage II

## 2023-11-23 NOTE — PHYSICAL THERAPY INITIAL EVALUATION ADULT - GENERAL OBSERVATIONS, REHAB EVAL
Pt was found lying in bed, aide in room, Pt is pleasant and willing to participate in PT, appears weak and has tremors from Parkinsons

## 2023-11-23 NOTE — DIETITIAN INITIAL EVALUATION ADULT - OTHER INFO
89 y/o male with a PMH anxiety, back pain, BPH, CAD s/p stent, RBBB, left foot drop, HLD, HTN, lumbar radiculopathy, lumbar stenosis, melanoma, s/p cholecystectomy, s/p CABG x3, s/p discectomy for herniated nucleus pulposus, syncopal episode x1 thought to be vasovagal vs autonomic dysregulation in setting of parkinson's disease, who presents from home due to inability to ambulate on own. Pt states he has a 24/7 HHA who usually helps him get to his walker however today he noticed he was more stiff than usual and that he is not strong enough to take care of himself. HHA at bedside states was unable to lift him back up from floor so called EMS for help. In ED pt failed ambulation trial and family and pt feel unsafe d/c as aide not going to be there tomorrow for holiday.  GOC: full code.     Upon RD visit, family friend at bed side. Received breakfast tray, ordered egg omelette with Samoan toast and salgado, on DASH diet; RD assisted w/ tray set-up. UBW stated at 174# x ~1 week ago, no recent changes in wt. Unable to obtain bed scale wt 2/2 scale not working. EMR wt doc'd at 170#; 4# wt loss/ 2.29% x ~1 week - severe and clinically significant. NFPE reveals moderate muscle/fat wasting. Will provide ensure + HP daily - pt receptive. Suggest to liberalize diet to regular to maximize caloric and nutrient intake. See additional recommendations below.

## 2023-11-23 NOTE — DIETITIAN INITIAL EVALUATION ADULT - PERTINENT LABORATORY DATA
11-23    142  |  110<H>  |  24<H>  ----------------------------<  102<H>  3.5   |  27  |  0.94    Ca    8.8      23 Nov 2023 07:03    TPro  5.8<L>  /  Alb  2.8<L>  /  TBili  0.7  /  DBili  x   /  AST  38<H>  /  ALT  8<L>  /  AlkPhos  75  11-23

## 2023-11-23 NOTE — PHYSICAL THERAPY INITIAL EVALUATION ADULT - LIVES WITH, PROFILE
24/7 Live in [place], Dtr lives in [place] 24/7 Live in [facility], Dtr lives in [place], No steps to enter, Stair lift to bedroom

## 2023-11-23 NOTE — PROGRESS NOTE ADULT - ASSESSMENT
Pt is a 89 yo male with a pmh/o syncope, CABG, GERD, Parkinson's disease, who lives at home with HHA, who presents to Ed due to difficulty ambulating s/p PT/work out few days ago, resulting in bilateral thigh weakness. Pt slid to floor while trying to get to walker and HHA unable to pick him up. EMS called. Pt admitted due to:    #Debility  #Parkinsons disease  Admit to med/surg  Social work consult  PT consult  OOB and ambulate with assistance  will likely need placement  f/u official XR readings- no acute pathology on prelim per ED   Tylenol prn pain  c/w home meds  f/u AM labs    #Leukocytosis  CXR- no consolidation on prelim, f/u official read  RVP/COVID pending  T99.4 orally  f/u urinalysis  US b/l LE- no dvt  monitor off abx as no source identified, suspect UTI    #Elevated CK  #Thigh pain  Pain present s/p PT/work out on 11/20, likely MSK pain  IVF for gentle hydration  s/p 500cc IVF bolus in ED    #HTN/HLD/CAD  c/w HCTZ  c/w ARB interchange while inpatient  c/w statin  DASH/TLC diet     Pt is a 89 yo male with a pmh/o syncope, CABG, GERD, Parkinson's disease,  LE neuropathy  admitted for:       #Frequent falls and difficulty ambulating due to Debility and deconditioning  in settings of Parkinson Dz and neuropathy, OA,  advanced age.   No trauma . Hip/pelvis/femur:  XR neg for acute Fx   US: neg for thigh hematoma   CPK mildly elevated likely 2/2 immobility, will trend   Fall precautions  Supportive care   PT   SW eval will likely need placement     #Leukocytosis  No signs of infection, possible dehydration?   CXR- no consolidation /PNA   UA not suggestive of infection   Will check LE doppler to r/o DVT   WBCs better after IVF   Hold HCTZ for now , oral hydration   monitor off abx  If spikes fever will need BCX, UCX RVP      Parkinson's disease. LE neuropathy   C/w Sinemet on low dose  C/w Gabapentin will up titrate dose to 200 TID   Neuro eval for possible meds adjustment       # Seizure Disorder?  C/w Keppra home med     #HTN/HLD/CAD  d/c  HCTZ due to dehydration   c/w ARB interchanged to losartan   lipitor     # Severe Pt calorie malnutrition   Liberalize diet to regular   check Vit levels   Supplement MVI and Thiamin   Pt supplements       # DVT PPX: [place] SQ

## 2023-11-23 NOTE — DIETITIAN INITIAL EVALUATION ADULT - ADD RECOMMEND
1) Liberalize diet to regular to maximize caloric and nutrient intake. Encourage protein-rich foods, maximize food preferences   2) Ensure + HP shake daily to optimize nutritional needs (provides 350 kcal, 20g protein/ shake)   3) Monitor bowel movements, if no BM for >3 days, consider implementing bowel regimen.   4) MVI w/ minerals daily to ensure 100% RDA met   5) In v/o malnutrition, consider adding 100mg thiamine daily   6) Monitor daily lytes/min and replete prn   7) Obtain weekly wt to track/trend changes   8) Confirm goals of care regarding nutrition support  RD will continue to monitor PO intake, labs, hydration, and wt prn.

## 2023-11-23 NOTE — PHYSICAL THERAPY INITIAL EVALUATION ADULT - PERTINENT HX OF CURRENT PROBLEM, REHAB EVAL
89 yo male with a pmh/o anxiety, back pain, BPH, CAD s/p stent, RBBB, left foot drop, HLD, HTN, lumbar radiculopathy, lumbar stenosis, melanoma, s/p cholecystectomy, s/p CABG x3, s/p discectomy for herniated nucleus pulposus, syncopal episode x1 thought to be vasovagal vs autonomic dysregulation in setting of parkinson's disease, who presents from home due to inability to ambulate on own. Pt states he has a 24/7 HHA who usually helps him get to his walker however today he noticed he was more stiff than usual and that he is not strong enough to take care of himself. Pt states today he did not fall but rather "slid" himself down onto floor. HHA at bedside states was unable to lift him back up from floor so called EMS for help. Pt states he feels "fine" other than "thigh soreness". Pt states he pressed against thighs on way down. In Ed pt failed ambulation trial and family and pt feel unsafe d/c as aide not going to be there tomorrow for holiday. reports was on the floor for >30mn before EMS was able to come get  him up. States lately stiffness and his le neuropathy got worse lately. XR neg for acute Fx

## 2023-11-23 NOTE — DIETITIAN INITIAL EVALUATION ADULT - REASON FOR ADMISSION
[Well Nourished] : well nourished [Well Developed] : well developed [Well-Appearing] : well-appearing [Normal Sclera/Conjunctiva] : normal sclera/conjunctiva [PERRL] : pupils equal round and reactive to light [EOMI] : extraocular movements intact [Normal Outer Ear/Nose] : the outer ears and nose were normal in appearance [No JVD] : no jugular venous distention [No Lymphadenopathy] : no lymphadenopathy [Supple] : supple [Thyroid Normal, No Nodules] : the thyroid was normal and there were no nodules present [No Respiratory Distress] : no respiratory distress  [No Accessory Muscle Use] : no accessory muscle use [Clear to Auscultation] : lungs were clear to auscultation bilaterally [Normal Rate] : normal rate  [Regular Rhythm] : with a regular rhythm [Normal S1, S2] : normal S1 and S2 [No Murmur] : no murmur heard [Soft] : abdomen soft [Non Tender] : non-tender [Non-distended] : non-distended [Normal Posterior Cervical Nodes] : no posterior cervical lymphadenopathy [Normal Anterior Cervical Nodes] : no anterior cervical lymphadenopathy [No CVA Tenderness] : no CVA  tenderness [No Spinal Tenderness] : no spinal tenderness [No Joint Swelling] : no joint swelling [Grossly Normal Strength/Tone] : grossly normal strength/tone Difficulty in walking, not elsewhere classified     [No Rash] : no rash [Coordination Grossly Intact] : coordination grossly intact [No Focal Deficits] : no focal deficits [Normal Gait] : normal gait [Normal Affect] : the affect was normal [Normal Insight/Judgement] : insight and judgment were intact

## 2023-11-23 NOTE — DIETITIAN INITIAL EVALUATION ADULT - ORAL INTAKE PTA/DIET HISTORY
Lives at home, has 24 hr HHA and has dtr and  family member who lives near by. Does not follow specific diet at home. Endorses fair to good appetite PTA, typically consumes 3 meals per day. Likely meeting <75% ENN. Consumes ensure daily. C/o constipation x couple days PTA, has BMs sometimes q3 days per pt.

## 2023-11-23 NOTE — DIETITIAN NUTRITION RISK NOTIFICATION - TREATMENT: THE FOLLOWING DIET HAS BEEN RECOMMENDED
Diet, Regular:   DASH/TLC {Sodium & Cholesterol Restricted} (DASH) (11-22-23 @ 20:09) [Active]

## 2023-11-23 NOTE — CONSULT NOTE ADULT - SUBJECTIVE AND OBJECTIVE BOX
Patient is a 89y old  Male who presents with a chief complaint of Frequent falls, debility (23 Nov 2023 12:33)      HPI:  Mr. Miller  is a 89 yo male with a pmh/o anxiety, back pain, BPH, CAD s/p stent, RBBB, left foot drop, HLD, HTN, lumbar radiculopathy, lumbar stenosis, melanoma, s/p cholecystectomy, s/p CABG x3, s/p discectomy for herniated nucleus pulposus, syncopal epsiode x1 thought to be vasovagal vs autonomic dysregulation in setting of parkinson's disease, who presents from home due to inability to ambulate on own. Pt states he has a 24/7 HHA who usually helps him get to his walker. However, on the day of admission, he noticed he was more stiff than usual and that he is not strong enough to take care of himself. Pt states today he did not fall but rather "slid" himself down onto floor. HHA at bedside states was unable to lift him back up from floor so called EMS for help. Pt states he feels "fine" other than "thigh soreness". Pt states he pressed against thighs on way down. In Ed pt failed ambulation trial and family and pt feel unsafe d/c as aide not going to be there tomorrow for holiday. (22 Nov 2023 19:27)      PAST MEDICAL & SURGICAL HISTORY:  H/O hyperlipidemia      HTN - Hypertension      GERD (gastroesophageal reflux disease)      Foot drop, left      Tremors of nervous system      BPH (benign prostatic hyperplasia)      Lumbar radiculopathy      Lumbar stenosis      Stented coronary artery  2004      Melanoma      Anxiety      CAD (coronary artery disease) of artery bypass graft      Back pain      S/P discectomy for herniated nucleus pulposus  2000, 2002 with left dropped foot      S/P colonoscopy      S/P CABG x 3  2013      History of cholecystectomy  2015      History of cataract surgery  IOL      Elective surgery  excision of melanoma          FAMILY HISTORY:  No pertinent family history in first degree relatives        Social Hx:  Nonsmoker, no drug or alcohol use    MEDICATIONS  (STANDING):  atorvastatin 40 milliGRAM(s) Oral at bedtime  carbidopa/levodopa  25/100 1 Tablet(s) Oral three times a day  gabapentin 100 milliGRAM(s) Oral three times a day  influenza  Vaccine (HIGH DOSE) 0.7 milliLiter(s) IntraMuscular once  levETIRAcetam 250 milliGRAM(s) Oral <User Schedule>  losartan 25 milliGRAM(s) Oral daily       Allergies    No Known Allergies    Intolerances        ROS: Pertinent positives in HPI, all other ROS were reviewed and are negative.      Vital Signs Last 24 Hrs  T(C): 36.4 (23 Nov 2023 08:41), Max: 37.6 (22 Nov 2023 19:55)  T(F): 97.5 (23 Nov 2023 08:41), Max: 99.7 (22 Nov 2023 19:55)  HR: 68 (23 Nov 2023 08:41) (68 - 88)  BP: 117/57 (23 Nov 2023 08:41) (117/57 - 139/77)  BP(mean): 99 (22 Nov 2023 18:55) (99 - 99)  RR: 18 (23 Nov 2023 08:41) (16 - 18)  SpO2: 94% (23 Nov 2023 08:41) (92% - 100%)    Parameters below as of 23 Nov 2023 08:41  Patient On (Oxygen Delivery Method): room air            Constitutional: awake and alert.  HEENT: PERRLA, EOMI,   Neck: Supple.  Respiratory: Breath sounds are clear bilaterally  Cardiovascular: S1 and S2, regular / irregular rhythm  Gastrointestinal: soft, nontender  Extremities:  no edema  Vascular: Caritid Bruit - no  Musculoskeletal: no joint swelling/tenderness, no abnormal movements  Skin: No rashes    Neurological exam:  HF: A x O x 3. Appropriately interactive, normal affect. Speech fluent, No Aphasia or paraphasic errors. Naming /repetition intact   CN: JONATHAN, EOMI, VFF, facial sensation normal, no NLFD, tongue midline, Palate moves equally, SCM equal bilaterally  Motor: No pronator drift, Strength 5/5 in all 4 ext, normal bulk and tone, no tremor, rigidity or bradykinesia.    Sens: Intact to light touch / PP/ VS/ JS    Reflexes: Symmetric and normal . BJ 2+, BR 2+, KJ 2+, AJ 2+, downgoing toes b/l  Coord:  No FNFA, dysmetria, DAVID intact   Gait/Balance: Normal/Cannot test    NIHSS:          Labs:   11-23    142  |  110<H>  |  24<H>  ----------------------------<  102<H>  3.5   |  27  |  0.94    Ca    8.8      23 Nov 2023 07:03    TPro  5.8<L>  /  Alb  2.8<L>  /  TBili  0.7  /  DBili  x   /  AST  38<H>  /  ALT  8<L>  /  AlkPhos  75  11-23                              11.9   13.11 )-----------( 139      ( 23 Nov 2023 07:03 )             35.5       Radiology:     Patient is a 89y old  Male who presents with a chief complaint of Frequent falls, debility (23 Nov 2023 12:33)      HPI:  Mr. Miller  is a 89 yo male with a pmh/o anxiety, back pain, BPH, CAD s/p stent, RBBB, left foot drop, HLD, HTN, lumbar radiculopathy, lumbar stenosis, melanoma, s/p cholecystectomy, s/p CABG x3, s/p discectomy for herniated nucleus pulposus, syncopal epsiode x1 thought to be vasovagal vs autonomic dysregulation in setting of parkinson's disease, who presents from home due to inability to ambulate on own. Pt states he has a 24/7 HHA who usually helps him get to his walker. However, on the day of admission, he noticed he was more stiff than usual and that he is not strong enough to take care of himself. Pt states today he did not fall but rather "slid" himself down onto floor. HHA at bedside states was unable to lift him back up from floor so called EMS for help. Pt states he feels "fine" other than "thigh soreness". Pt states he pressed against thighs on way down. In Ed pt failed ambulation trial and family and pt feel unsafe d/c as aide not going to be there tomorrow for holiday. (22 Nov 2023 19:27)      11/23/23: Patient reports some pain around his ankles. He says that he takes gabapentin 100 mg 3-4 times per day at home to help with neuropathic pain.  He denies lightheadedness/dizziness.  He does report stiffness. He was diagnosed with parkinson's disease 4-5 years ago (was following with Dr. Rutherford, now Dr. Weiner). It does not seem that his dosage of Sinemet has been increased.    He states that ~ 5 years ago he was told by family that he had a seizure. He recalls having sudden "neuropathic pain" that made him stop speaking. His family then said he had a seizure. He says that testing never showed evidence of seizures but he has stayed on medication.      PAST MEDICAL & SURGICAL HISTORY:  H/O hyperlipidemia      HTN - Hypertension      GERD (gastroesophageal reflux disease)      Foot drop, left      Tremors of nervous system      BPH (benign prostatic hyperplasia)      Lumbar radiculopathy      Lumbar stenosis      Stented coronary artery  2004      Melanoma      Anxiety      CAD (coronary artery disease) of artery bypass graft      Back pain      S/P discectomy for herniated nucleus pulposus  2000, 2002 with left dropped foot      S/P colonoscopy      S/P CABG x 3  2013      History of cholecystectomy  2015      History of cataract surgery  IOL      Elective surgery  excision of melanoma          FAMILY HISTORY:  No pertinent family history in first degree relatives        Social Hx:  Nonsmoker, no drug or alcohol use    MEDICATIONS  (STANDING):  atorvastatin 40 milliGRAM(s) Oral at bedtime  carbidopa/levodopa  25/100 1 Tablet(s) Oral three times a day  gabapentin 100 milliGRAM(s) Oral three times a day  influenza  Vaccine (HIGH DOSE) 0.7 milliLiter(s) IntraMuscular once  levETIRAcetam 250 milliGRAM(s) Oral <User Schedule>  losartan 25 milliGRAM(s) Oral daily       Allergies    No Known Allergies    Intolerances        ROS: Pertinent positives in HPI, all other ROS were reviewed and are negative.      Vital Signs Last 24 Hrs  T(C): 36.4 (23 Nov 2023 08:41), Max: 37.6 (22 Nov 2023 19:55)  T(F): 97.5 (23 Nov 2023 08:41), Max: 99.7 (22 Nov 2023 19:55)  HR: 68 (23 Nov 2023 08:41) (68 - 88)  BP: 117/57 (23 Nov 2023 08:41) (117/57 - 139/77)  BP(mean): 99 (22 Nov 2023 18:55) (99 - 99)  RR: 18 (23 Nov 2023 08:41) (16 - 18)  SpO2: 94% (23 Nov 2023 08:41) (92% - 100%)    Parameters below as of 23 Nov 2023 08:41  Patient On (Oxygen Delivery Method): room air            Constitutional: awake and alert.  HEENT: PERRLA, EOMI,   Neck: Supple.  Respiratory: Breath sounds are clear bilaterally  Cardiovascular: S1 and S2, regular / irregular rhythm  Gastrointestinal: soft, nontender  Extremities:  no edema  Musculoskeletal: no joint swelling/tenderness, no abnormal movements  Skin: No rashes    Neurological exam:  HF: Awake and alert Appropriately interactive, normal affect. Speech fluent, No Aphasia or paraphasic errors.   CN: JONATHAN, EOMI, VFF, facial sensation normal, no NLFD, tongue midline, Palate moves equally, SCM equal bilaterally, mild hypophonia  Motor: No pronator drift, Strength 5/5 in upper extremities 5- to 5/5 in lower extremities, + increased tone and cogwheel rigidity in upper extremities, + bradykinesia  Sens: Intact to light touch   Reflexes: Symmetric and normal . BJ trace, BR trace, KJ absent, AJ absent, downgoing toes b/l  Coord:  + rest tremor in b/l upper extremities  Gait/Balance: Not tested          Labs:   11-23    142  |  110<H>  |  24<H>  ----------------------------<  102<H>  3.5   |  27  |  0.94    Ca    8.8      23 Nov 2023 07:03    TPro  5.8<L>  /  Alb  2.8<L>  /  TBili  0.7  /  DBili  x   /  AST  38<H>  /  ALT  8<L>  /  AlkPhos  75  11-23                              11.9   13.11 )-----------( 139      ( 23 Nov 2023 07:03 )             35.5       Radiology:

## 2023-11-23 NOTE — DIETITIAN INITIAL EVALUATION ADULT - PERTINENT MEDS FT
MEDICATIONS  (STANDING):  atorvastatin 40 milliGRAM(s) Oral at bedtime  carbidopa/levodopa  25/100 1 Tablet(s) Oral three times a day  gabapentin 100 milliGRAM(s) Oral three times a day  hydrochlorothiazide 12.5 milliGRAM(s) Oral daily  influenza  Vaccine (HIGH DOSE) 0.7 milliLiter(s) IntraMuscular once  levETIRAcetam 250 milliGRAM(s) Oral <User Schedule>  losartan 25 milliGRAM(s) Oral daily    MEDICATIONS  (PRN):  acetaminophen     Tablet .. 650 milliGRAM(s) Oral every 6 hours PRN Temp greater or equal to 38C (100.4F), Mild Pain (1 - 3)  melatonin 3 milliGRAM(s) Oral at bedtime PRN Insomnia

## 2023-11-23 NOTE — PHYSICAL THERAPY INITIAL EVALUATION ADULT - ADDITIONAL COMMENTS
Patient and family are hoping to find a SUSAN near the daughter in [facility]. After SUSAN, the patient presently prefers to return home to [place] but may  also consider getting into an assisted living in [facility].

## 2023-11-23 NOTE — CONSULT NOTE ADULT - ASSESSMENT
Mr. Miller  is a 89 yo male with a pmh/o anxiety, back pain, BPH, CAD s/p stent, RBBB, left foot drop, HLD, HTN, lumbar radiculopathy, lumbar stenosis, melanoma, s/p cholecystectomy, s/p CABG x3, s/p discectomy for herniated nucleus pulposus, syncopal epsiode x1 thought to be vasovagal vs autonomic dysregulation in setting of parkinson's disease, who presents from home due to inability to ambulate on own. Pt states he has a 24/7 HHA who usually helps him get to his walker. However, on the day of admission, he noticed he was more stiff than usual and that he is not strong enough to take care of himself. Pt states today he did not fall but rather "slid" himself down onto floor.  He failed an ambulation trial in the ED.    Difficulty walking  -Multifactorial  -Would likely benefit from rehab  -PT    Parkinson's Disease  -He has significant rigidity and is on low dose carbidopa/levodopa.  -Will increase from 25/100 mg TID to four times per day (he says he would prefer this to taking 1.5 pills three times per day). Will dose at 7, 11, 3, 7.  -Watch for any dizziness or orthostatic hypotension.    Neuropathy  -Chronic neuropathic pain for which he takes gabapentin 300-400 mg/day He takes 400 mg if he remembers a 4th dose.  -Will change dosing to 100-100-200    History of seizure  -He is on levetiracetam which can be continued    will f/u as needed

## 2023-11-23 NOTE — PHYSICAL THERAPY INITIAL EVALUATION ADULT - DIAGNOSIS, PT EVAL
Frequent falls and difficulty ambulating due to Debility and deconditioning  in settings of Parkinson Dz and neuropathy, OA,  advanced age.

## 2023-11-23 NOTE — PROGRESS NOTE ADULT - SUBJECTIVE AND OBJECTIVE BOX
CC: Difficulty in walking, not elsewhere classified    HPI: 89 yo male with a pmh/o anxiety, back pain, BPH, CAD s/p stent, RBBB, left foot drop, HLD, HTN, lumbar radiculopathy, lumbar stenosis, melanoma, s/p cholecystectomy, s/p CABG x3, s/p discectomy for herniated nucleus pulposus, syncopal epsiode x1 thought to be vasovagal vs autonomic dysregulation in setting of parkinson's disease, who presents from home due to inability to ambulate on own. Pt states he has a 24/7 HHA who usually helps him get to his walker however today he noticed he was more stiff than usual and that he is not strong enough to take care of himself. Pt states today he did not fall but rather "slid" himself down onto floor. HHA at bedside states was unable to lift him back up from floor so called EMS for help. Pt states he feels "fine" other than "thigh soreness". Pt states he pressed against thighs on way down. In Ed pt failed ambulation trial and family and pt feel unsafe d/c as aide not going to be there tomorrow for holiday.      INTERVAL HPI/ OVERNIGHT EVENTS:    Vital Signs Last 24 Hrs  T(C): 36.4 (23 Nov 2023 08:41), Max: 37.6 (22 Nov 2023 19:55)  T(F): 97.5 (23 Nov 2023 08:41), Max: 99.7 (22 Nov 2023 19:55)  HR: 68 (23 Nov 2023 08:41) (68 - 88)  BP: 117/57 (23 Nov 2023 08:41) (117/57 - 139/77)  BP(mean): 99 (22 Nov 2023 18:55) (99 - 99)  RR: 18 (23 Nov 2023 08:41) (16 - 18)  SpO2: 94% (23 Nov 2023 08:41) (92% - 100%)    Parameters below as of 23 Nov 2023 08:41  Patient On (Oxygen Delivery Method): room air      REVIEW OF SYSTEMS:  All other review of systems is negative unless indicated above.      PHYSICAL EXAM:  General: in no acute distress  Eyes: PERRLA, EOMI; conjunctiva and sclera clear  Head: Normocephalic; atraumatic  ENMT: No nasal discharge; airway clear  Neck: Supple; non tender; no masses  Respiratory: No wheezes, rales or rhonchi  Cardiovascular: Regular rate and rhythm. S1 and S2 Normal; No murmurs, gallops or rubs  Gastrointestinal: Soft non-tender non-distended; Normal bowel sounds  Genitourinary: No  suprapubic  tenderness  Extremities: Normal range of motion, No clubbing, cyanosis or edema  Vascular: Peripheral pulses palpable 2+ bilaterally  Neurological: Alert and oriented x4  Skin: Warm and dry. No acute rash  Lymph Nodes: No acute cervical adenopathy  Musculoskeletal: Normal muscle tone, without deformities  Psychiatric: Cooperative and appropriate      LABS:   CARDIAC MARKERS ( 23 Nov 2023 07:03 )  x     / x     / 398 U/L / x     / x      CARDIAC MARKERS ( 22 Nov 2023 15:52 )  x     / x     / 571 U/L / x     / x                                11.9   13.11 )-----------( 139      ( 23 Nov 2023 07:03 )             35.5     23 Nov 2023 07:03    142    |  110    |  24     ----------------------------<  102    3.5     |  27     |  0.94     Ca    8.8        23 Nov 2023 07:03    TPro  5.8    /  Alb  2.8    /  TBili  0.7    /  DBili  x      /  AST  38     /  ALT  8      /  AlkPhos  75     23 Nov 2023 07:03    PT/INR - ( 23 Nov 2023 07:03 )   PT: 12.1 sec;   INR: 1.07 ratio         PTT - ( 23 Nov 2023 07:03 )  PTT:29.4 sec  CAPILLARY BLOOD GLUCOSE        LIVER FUNCTIONS - ( 23 Nov 2023 07:03 )  Alb: 2.8 g/dL / Pro: 5.8 gm/dL / ALK PHOS: 75 U/L / ALT: 8 U/L / AST: 38 U/L / GGT: x           Urinalysis Basic - ( 23 Nov 2023 07:03 )    Color: x / Appearance: x / SG: x / pH: x  Gluc: 102 mg/dL / Ketone: x  / Bili: x / Urobili: x   Blood: x / Protein: x / Nitrite: x   Leuk Esterase: x / RBC: x / WBC x   Sq Epi: x / Non Sq Epi: x / Bacteria: x        MEDICATIONS  (STANDING):  atorvastatin 40 milliGRAM(s) Oral at bedtime  carbidopa/levodopa  25/100 1 Tablet(s) Oral three times a day  gabapentin 100 milliGRAM(s) Oral three times a day  hydrochlorothiazide 12.5 milliGRAM(s) Oral daily  influenza  Vaccine (HIGH DOSE) 0.7 milliLiter(s) IntraMuscular once  levETIRAcetam 250 milliGRAM(s) Oral <User Schedule>  losartan 25 milliGRAM(s) Oral daily    MEDICATIONS  (PRN):  acetaminophen     Tablet .. 650 milliGRAM(s) Oral every 6 hours PRN Temp greater or equal to 38C (100.4F), Mild Pain (1 - 3)  melatonin 3 milliGRAM(s) Oral at bedtime PRN Insomnia      RADIOLOGY & ADDITIONAL TESTS:    ACC: [id number] EXAM:  XR HIPS BI WITH PELV 2V   ORDERED BY: GIULIA CORDOVA     ACC: [id number] EXAM:  XR CHEST PORTABLE URGENT 1V   ORDERED BY: ORALIA MACIAS     ACC: [id number] EXAM:  XR FEMUR 2 VIEWS BI   ORDERED BY: GIULIA CORDOVA    PROCEDURE DATE:  11/22/2023          INTERPRETATION:  History:   Bilateral thigh pain after fall.    Technique: femur, 9 films. AP pelvis and bilateral hips 5 films    Comparison: None    Findings/  Impression:Mild degenerative changes are seen in the hips and knees. No   acute fracture. No dislocation. No displaced pelvic fracture. Bowel gas   overlies and obscures portions of the sacrum and iliac bone. . Vascular   calcifications are present.    ===============================  HISTORY: ; B/L thigh/quads pain, + fall this AM; r/o fx; rule out   pneumonia.  TECHNIQUE: Portable frontal view of the chest, 1 view.  COMPARISON 9/29/2019.  FINDINGS/IMPRESSION:  There is a loop recorder overlying the left chest.  HEART:difficult to access in this projection.  LUNGS: Mild interstitial edema is seen. Small left effusion. Prominent   pericardial fat pad, similar to prior. No definite evidence of pneumonia  BONES: sternotomy wires  .     CC: Difficulty in walking, not elsewhere classified    HPI: 89 yo male with a pmh/o anxiety, back pain, BPH, CAD s/p stent, RBBB, left foot drop, HLD, HTN, lumbar radiculopathy, lumbar stenosis, melanoma, s/p cholecystectomy, s/p CABG x3, s/p discectomy for herniated nucleus pulposus, syncopal episode x1 thought to be vasovagal vs autonomic dysregulation in setting of parkinson's disease, who presents from home due to inability to ambulate on own. Pt states he has a 24/7 HHA who usually helps him get to his walker however today he noticed he was more stiff than usual and that he is not strong enough to take care of himself. Pt states today he did not fall but rather "slid" himself down onto floor. HHA at bedside states was unable to lift him back up from floor so called EMS for help. Pt states he feels "fine" other than "thigh soreness". Pt states he pressed against thighs on way down. In Ed pt failed ambulation trial and family and pt feel unsafe d/c as aide not going to be there tomorrow for holiday.      INTERVAL HPI/ OVERNIGHT EVENTS:  chart reviewed, Pt was seen and examined, confirmed history above, reports wa son the floor for >30mn before EMS was able to come get  him up. States lately stiffness and his le neuropathy got worse lately. Pt  denies CP or SOB, no fevers or chills. No cough       Vital Signs Last 24 Hrs  T(C): 36.4 (23 Nov 2023 08:41), Max: 37.6 (22 Nov 2023 19:55)  T(F): 97.5 (23 Nov 2023 08:41), Max: 99.7 (22 Nov 2023 19:55)  HR: 68 (23 Nov 2023 08:41) (68 - 88)  BP: 117/57 (23 Nov 2023 08:41) (117/57 - 139/77)  BP(mean): 99 (22 Nov 2023 18:55) (99 - 99)  RR: 18 (23 Nov 2023 08:41) (16 - 18)  SpO2: 94% (23 Nov 2023 08:41) (92% - 100%)    Parameters below as of 23 Nov 2023 08:41  Patient On (Oxygen Delivery Method): room air      REVIEW OF SYSTEMS:  All other review of systems is negative unless indicated above.      PHYSICAL EXAM:  General:  Frail elderly male,  in no acute distress  Eyes:  EOMI; conjunctiva and sclera clear  Head: Normocephalic; atraumatic  ENMT: No nasal discharge; airway clear  Neck: Supple; non tender; no masses  Respiratory: Decreased BS at bases. No wheezes  Cardiovascular: Regular rate and rhythm. S1 and S2 Normal;   Gastrointestinal: Soft non-tender non-distended; Normal bowel sounds  Genitourinary: No  suprapubic  tenderness  Extremities: No  edema, anterior shin scabs   Neurological: Alert and oriented x 2-3, + muscle stiffness, + resting tremors   Musculoskeletal: No joint edema or erythema, no  deformities  Psychiatric: Cooperative       LABS:   CARDIAC MARKERS ( 23 Nov 2023 07:03 )  x     / x     / 398 U/L / x     / x      CARDIAC MARKERS ( 22 Nov 2023 15:52 )  x     / x     / 571 U/L / x     / x                              11.9   13.11 )-----------( 139      ( 23 Nov 2023 07:03 )             35.5     23 Nov 2023 07:03    142    |  110    |  24     ----------------------------<  102    3.5     |  27     |  0.94     Ca    8.8        23 Nov 2023 07:03    TPro  5.8    /  Alb  2.8    /  TBili  0.7    /  DBili  x      /  AST  38     /  ALT  8      /  AlkPhos  75     23 Nov 2023 07:03  PT/INR - ( 23 Nov 2023 07:03 )   PT: 12.1 sec;   INR: 1.07 ratio    PTT - ( 23 Nov 2023 07:03 )  PTT:29.4 sec      LIVER FUNCTIONS - ( 23 Nov 2023 07:03 )  Alb: 2.8 g/dL / Pro: 5.8 gm/dL / ALK PHOS: 75 U/L / ALT: 8 U/L / AST: 38 U/L / GGT: x           Urinalysis Basic - ( 23 Nov 2023 07:03 )  Color: x / Appearance: x / SG: x / pH: x  Gluc: 102 mg/dL / Ketone: x  / Bili: x / Urobili: x   Blood: x / Protein: x / Nitrite: x   Leuk Esterase: x / RBC: x / WBC x   Sq Epi: x / Non Sq Epi: x / Bacteria: x        MEDICATIONS  (STANDING):  atorvastatin 40 milliGRAM(s) Oral at bedtime  carbidopa/levodopa  25/100 1 Tablet(s) Oral three times a day  gabapentin 100 milliGRAM(s) Oral three times a day  hydrochlorothiazide 12.5 milliGRAM(s) Oral daily  influenza  Vaccine (HIGH DOSE) 0.7 milliLiter(s) IntraMuscular once  levETIRAcetam 250 milliGRAM(s) Oral <User Schedule>  losartan 25 milliGRAM(s) Oral daily    MEDICATIONS  (PRN):  acetaminophen     Tablet .. 650 milliGRAM(s) Oral every 6 hours PRN Temp greater or equal to 38C (100.4F), Mild Pain (1 - 3)  melatonin 3 milliGRAM(s) Oral at bedtime PRN Insomnia      RADIOLOGY & ADDITIONAL TESTS:    ACC: [id number] EXAM:  XR HIPS BI WITH PELV 2V   ORDERED BY: GIULIA CORDOVA     ACC: [id number] EXAM:  XR CHEST PORTABLE URGENT 1V   ORDERED BY: ORALIA MACIAS     ACC: [id number] EXAM:  XR FEMUR 2 VIEWS BI   ORDERED BY: GIULIA CORDOVA    PROCEDURE DATE:  11/22/2023          INTERPRETATION:  History:   Bilateral thigh pain after fall.    Technique: femur, 9 films. AP pelvis and bilateral hips 5 films    Comparison: None    Findings/  Impression:Mild degenerative changes are seen in the hips and knees. No   acute fracture. No dislocation. No displaced pelvic fracture. Bowel gas   overlies and obscures portions of the sacrum and iliac bone. . Vascular   calcifications are present.    ===============================  HISTORY: ; B/L thigh/quads pain, + fall this AM; r/o fx; rule out   pneumonia.  TECHNIQUE: Portable frontal view of the chest, 1 view.  COMPARISON 9/29/2019.  FINDINGS/IMPRESSION:  There is a loop recorder overlying the left chest.  HEART:difficult to access in this projection.  LUNGS: Mild interstitial edema is seen. Small left effusion. Prominent   pericardial fat pad, similar to prior. No definite evidence of pneumonia  BONES: sternotomy wires  .

## 2023-11-24 LAB
24R-OH-CALCIDIOL SERPL-MCNC: 23.3 NG/ML — LOW (ref 30–80)
24R-OH-CALCIDIOL SERPL-MCNC: 23.3 NG/ML — LOW (ref 30–80)
ANION GAP SERPL CALC-SCNC: 4 MMOL/L — LOW (ref 5–17)
ANION GAP SERPL CALC-SCNC: 4 MMOL/L — LOW (ref 5–17)
BUN SERPL-MCNC: 20 MG/DL — SIGNIFICANT CHANGE UP (ref 7–23)
BUN SERPL-MCNC: 20 MG/DL — SIGNIFICANT CHANGE UP (ref 7–23)
CALCIUM SERPL-MCNC: 9 MG/DL — SIGNIFICANT CHANGE UP (ref 8.5–10.1)
CALCIUM SERPL-MCNC: 9 MG/DL — SIGNIFICANT CHANGE UP (ref 8.5–10.1)
CHLORIDE SERPL-SCNC: 107 MMOL/L — SIGNIFICANT CHANGE UP (ref 96–108)
CHLORIDE SERPL-SCNC: 107 MMOL/L — SIGNIFICANT CHANGE UP (ref 96–108)
CO2 SERPL-SCNC: 29 MMOL/L — SIGNIFICANT CHANGE UP (ref 22–31)
CO2 SERPL-SCNC: 29 MMOL/L — SIGNIFICANT CHANGE UP (ref 22–31)
CREAT SERPL-MCNC: 0.92 MG/DL — SIGNIFICANT CHANGE UP (ref 0.5–1.3)
CREAT SERPL-MCNC: 0.92 MG/DL — SIGNIFICANT CHANGE UP (ref 0.5–1.3)
CULTURE RESULTS: NO GROWTH — SIGNIFICANT CHANGE UP
CULTURE RESULTS: NO GROWTH — SIGNIFICANT CHANGE UP
EGFR: 79 ML/MIN/1.73M2 — SIGNIFICANT CHANGE UP
EGFR: 79 ML/MIN/1.73M2 — SIGNIFICANT CHANGE UP
FOLATE SERPL-MCNC: 6.9 NG/ML — SIGNIFICANT CHANGE UP
FOLATE SERPL-MCNC: 6.9 NG/ML — SIGNIFICANT CHANGE UP
GLUCOSE SERPL-MCNC: 112 MG/DL — HIGH (ref 70–99)
GLUCOSE SERPL-MCNC: 112 MG/DL — HIGH (ref 70–99)
HCT VFR BLD CALC: 36.3 % — LOW (ref 39–50)
HCT VFR BLD CALC: 36.3 % — LOW (ref 39–50)
HGB BLD-MCNC: 12.4 G/DL — LOW (ref 13–17)
HGB BLD-MCNC: 12.4 G/DL — LOW (ref 13–17)
MCHC RBC-ENTMCNC: 31.3 PG — SIGNIFICANT CHANGE UP (ref 27–34)
MCHC RBC-ENTMCNC: 31.3 PG — SIGNIFICANT CHANGE UP (ref 27–34)
MCHC RBC-ENTMCNC: 34.2 GM/DL — SIGNIFICANT CHANGE UP (ref 32–36)
MCHC RBC-ENTMCNC: 34.2 GM/DL — SIGNIFICANT CHANGE UP (ref 32–36)
MCV RBC AUTO: 91.7 FL — SIGNIFICANT CHANGE UP (ref 80–100)
MCV RBC AUTO: 91.7 FL — SIGNIFICANT CHANGE UP (ref 80–100)
PLATELET # BLD AUTO: 156 K/UL — SIGNIFICANT CHANGE UP (ref 150–400)
PLATELET # BLD AUTO: 156 K/UL — SIGNIFICANT CHANGE UP (ref 150–400)
POTASSIUM SERPL-MCNC: 3.3 MMOL/L — LOW (ref 3.5–5.3)
POTASSIUM SERPL-MCNC: 3.3 MMOL/L — LOW (ref 3.5–5.3)
POTASSIUM SERPL-SCNC: 3.3 MMOL/L — LOW (ref 3.5–5.3)
POTASSIUM SERPL-SCNC: 3.3 MMOL/L — LOW (ref 3.5–5.3)
RBC # BLD: 3.96 M/UL — LOW (ref 4.2–5.8)
RBC # BLD: 3.96 M/UL — LOW (ref 4.2–5.8)
RBC # FLD: 12.3 % — SIGNIFICANT CHANGE UP (ref 10.3–14.5)
RBC # FLD: 12.3 % — SIGNIFICANT CHANGE UP (ref 10.3–14.5)
SODIUM SERPL-SCNC: 140 MMOL/L — SIGNIFICANT CHANGE UP (ref 135–145)
SODIUM SERPL-SCNC: 140 MMOL/L — SIGNIFICANT CHANGE UP (ref 135–145)
SPECIMEN SOURCE: SIGNIFICANT CHANGE UP
SPECIMEN SOURCE: SIGNIFICANT CHANGE UP
TSH SERPL-MCNC: 1.77 UU/ML — SIGNIFICANT CHANGE UP (ref 0.34–4.82)
TSH SERPL-MCNC: 1.77 UU/ML — SIGNIFICANT CHANGE UP (ref 0.34–4.82)
VIT B12 SERPL-MCNC: 468 PG/ML — SIGNIFICANT CHANGE UP (ref 232–1245)
VIT B12 SERPL-MCNC: 468 PG/ML — SIGNIFICANT CHANGE UP (ref 232–1245)
WBC # BLD: 10.8 K/UL — HIGH (ref 3.8–10.5)
WBC # BLD: 10.8 K/UL — HIGH (ref 3.8–10.5)
WBC # FLD AUTO: 10.8 K/UL — HIGH (ref 3.8–10.5)
WBC # FLD AUTO: 10.8 K/UL — HIGH (ref 3.8–10.5)

## 2023-11-24 PROCEDURE — 99233 SBSQ HOSP IP/OBS HIGH 50: CPT

## 2023-11-24 PROCEDURE — 99232 SBSQ HOSP IP/OBS MODERATE 35: CPT

## 2023-11-24 PROCEDURE — 93970 EXTREMITY STUDY: CPT | Mod: 26

## 2023-11-24 RX ADMIN — CARBIDOPA AND LEVODOPA 1 TABLET(S): 25; 100 TABLET ORAL at 15:25

## 2023-11-24 RX ADMIN — LEVETIRACETAM 250 MILLIGRAM(S): 250 TABLET, FILM COATED ORAL at 21:19

## 2023-11-24 RX ADMIN — GABAPENTIN 100 MILLIGRAM(S): 400 CAPSULE ORAL at 06:36

## 2023-11-24 RX ADMIN — LEVETIRACETAM 250 MILLIGRAM(S): 250 TABLET, FILM COATED ORAL at 06:37

## 2023-11-24 RX ADMIN — CARBIDOPA AND LEVODOPA 1 TABLET(S): 25; 100 TABLET ORAL at 06:37

## 2023-11-24 RX ADMIN — LOSARTAN POTASSIUM 25 MILLIGRAM(S): 100 TABLET, FILM COATED ORAL at 09:25

## 2023-11-24 RX ADMIN — Medication 1 TABLET(S): at 09:25

## 2023-11-24 RX ADMIN — ATORVASTATIN CALCIUM 40 MILLIGRAM(S): 80 TABLET, FILM COATED ORAL at 21:19

## 2023-11-24 RX ADMIN — GABAPENTIN 100 MILLIGRAM(S): 400 CAPSULE ORAL at 15:25

## 2023-11-24 RX ADMIN — GABAPENTIN 200 MILLIGRAM(S): 400 CAPSULE ORAL at 21:19

## 2023-11-24 RX ADMIN — POLYETHYLENE GLYCOL 3350 17 GRAM(S): 17 POWDER, FOR SOLUTION ORAL at 09:25

## 2023-11-24 RX ADMIN — ENOXAPARIN SODIUM 40 MILLIGRAM(S): 100 INJECTION SUBCUTANEOUS at 15:26

## 2023-11-24 RX ADMIN — SENNA PLUS 2 TABLET(S): 8.6 TABLET ORAL at 21:19

## 2023-11-24 RX ADMIN — Medication 100 MILLIGRAM(S): at 09:25

## 2023-11-24 RX ADMIN — CARBIDOPA AND LEVODOPA 1 TABLET(S): 25; 100 TABLET ORAL at 11:11

## 2023-11-24 RX ADMIN — LEVETIRACETAM 250 MILLIGRAM(S): 250 TABLET, FILM COATED ORAL at 14:25

## 2023-11-24 RX ADMIN — CARBIDOPA AND LEVODOPA 1 TABLET(S): 25; 100 TABLET ORAL at 18:50

## 2023-11-24 NOTE — PROGRESS NOTE ADULT - ASSESSMENT
Pt is a 89 yo male with a pmh/o syncope, CABG, GERD, Parkinson's disease,  LE neuropathy  admitted for:       #Frequent falls and difficulty ambulating due to Debility and deconditioning  in settings of Parkinson Dz and neuropathy, OA,  advanced age.   No trauma . Hip/pelvis/femur:  XR neg for acute Fx   US: neg for thigh hematoma   CPK mildly elevated likely 2/2 immobility, will trend   Fall precautions  Supportive care   PT   SW eval will likely need placement     #Leukocytosis  No signs of infection, possible dehydration?   CXR- no consolidation /PNA   UA not suggestive of infection   duplex: negative for DVT   WBCs better after IVF   Hold HCTZ for now , oral hydration   monitor off abx  If spikes fever will need BCX, UCX RVP      Parkinson's disease. LE neuropathy   C/w Sinemet on low dose  C/w Gabapentin will up titrate dose to 200 TID   Neuro eval for possible meds adjustment       # Seizure Disorder  C/w Keppra home med     #HTN/HLD/CAD  d/c  HCTZ due to dehydration   c/w ARB interchanged to losartan   lipitor     # Severe Pt calorie malnutrition   Liberalize diet to regular   check Vit levels   Supplement MVI and Thiamin   Pt supplements       # DVT PPX: Loveox [facility]

## 2023-11-24 NOTE — PROGRESS NOTE ADULT - SUBJECTIVE AND OBJECTIVE BOX
CC: Difficulty in walking, not elsewhere classified    HPI: 89 yo male with a pmh/o anxiety, back pain, BPH, CAD s/p stent, RBBB, left foot drop, HLD, HTN, lumbar radiculopathy, lumbar stenosis, melanoma, s/p cholecystectomy, s/p CABG x3, s/p discectomy for herniated nucleus pulposus, syncopal episode x1 thought to be vasovagal vs autonomic dysregulation in setting of parkinson's disease, who presents from home due to inability to ambulate on own. Pt states he has a 24/7 HHA who usually helps him get to his walker however today he noticed he was more stiff than usual and that he is not strong enough to take care of himself. Pt states today he did not fall but rather "slid" himself down onto floor. HHA at bedside states was unable to lift him back up from floor so called EMS for help. Pt states he feels "fine" other than "thigh soreness". Pt states he pressed against thighs on way down. In Ed pt failed ambulation trial and family and pt feel unsafe d/c as aide not going to be there tomorrow for holiday.      INTERVAL HPI/ OVERNIGHT EVENTS:  chart reviewed, Pt was seen and examined, confirmed history above, reports wa son the floor for >30mn before EMS was able to come get  him up. States lately stiffness and his le neuropathy got worse lately. Pt  denies CP or SOB, no fevers or chills. No cough     11/24: Pt seen and evaluated. Mild improvement in symptoms. No other acute complaints.       Vital Signs Last 24 Hrs  T(C): 36.7 (24 Nov 2023 15:25), Max: 36.7 (24 Nov 2023 15:25)  T(F): 98.1 (24 Nov 2023 15:25), Max: 98.1 (24 Nov 2023 15:25)  HR: 76 (24 Nov 2023 15:25) (61 - 77)  BP: 118/70 (24 Nov 2023 15:25) (118/70 - 136/79)  BP(mean): --  RR: 18 (24 Nov 2023 15:25) (17 - 18)  SpO2: 97% (24 Nov 2023 07:44) (97% - 100%)            REVIEW OF SYSTEMS:  All other review of systems is negative unless indicated above.      PHYSICAL EXAM:  General:  Frail elderly male,  in no acute distress  Eyes:  EOMI; conjunctiva and sclera clear  Head: Normocephalic; atraumatic  ENMT: No nasal discharge; airway clear  Neck: Supple; non tender; no masses  Respiratory: Decreased BS at bases. No wheezes  Cardiovascular: Regular rate and rhythm. S1 and S2 Normal;   Gastrointestinal: Soft non-tender non-distended; Normal bowel sounds  Genitourinary: No  suprapubic  tenderness  Extremities: No  edema, anterior shin scabs   Neurological: Alert and oriented x 2-3, + muscle stiffness, + resting tremors   Musculoskeletal: No joint edema or erythema, no  deformities  Psychiatric: Cooperative       LABS:   CARDIAC MARKERS ( 23 Nov 2023 07:03 )  x     / x     / 398 U/L / x     / x      CARDIAC MARKERS ( 22 Nov 2023 15:52 )  x     / x     / 571 U/L / x     / x                              11.9   13.11 )-----------( 139      ( 23 Nov 2023 07:03 )             35.5     23 Nov 2023 07:03    142    |  110    |  24     ----------------------------<  102    3.5     |  27     |  0.94     Ca    8.8        23 Nov 2023 07:03    TPro  5.8    /  Alb  2.8    /  TBili  0.7    /  DBili  x      /  AST  38     /  ALT  8      /  AlkPhos  75     23 Nov 2023 07:03  PT/INR - ( 23 Nov 2023 07:03 )   PT: 12.1 sec;   INR: 1.07 ratio    PTT - ( 23 Nov 2023 07:03 )  PTT:29.4 sec      LIVER FUNCTIONS - ( 23 Nov 2023 07:03 )  Alb: 2.8 g/dL / Pro: 5.8 gm/dL / ALK PHOS: 75 U/L / ALT: 8 U/L / AST: 38 U/L / GGT: x           Urinalysis Basic - ( 23 Nov 2023 07:03 )  Color: x / Appearance: x / SG: x / pH: x  Gluc: 102 mg/dL / Ketone: x  / Bili: x / Urobili: x   Blood: x / Protein: x / Nitrite: x   Leuk Esterase: x / RBC: x / WBC x   Sq Epi: x / Non Sq Epi: x / Bacteria: x        MEDICATIONS  (STANDING):  atorvastatin 40 milliGRAM(s) Oral at bedtime  carbidopa/levodopa  25/100 1 Tablet(s) Oral <User Schedule>  enoxaparin Injectable 40 milliGRAM(s) SubCutaneous every 24 hours  gabapentin 100 milliGRAM(s) Oral <User Schedule>  gabapentin 200 milliGRAM(s) Oral <User Schedule>  influenza  Vaccine (HIGH DOSE) 0.7 milliLiter(s) IntraMuscular once  levETIRAcetam 250 milliGRAM(s) Oral <User Schedule>  losartan 25 milliGRAM(s) Oral daily  multivitamin/minerals 1 Tablet(s) Oral daily  polyethylene glycol 3350 17 Gram(s) Oral daily  thiamine 100 milliGRAM(s) Oral daily    MEDICATIONS  (PRN):  acetaminophen     Tablet .. 650 milliGRAM(s) Oral every 6 hours PRN Temp greater or equal to 38C (100.4F), Mild Pain (1 - 3)  melatonin 3 milliGRAM(s) Oral at bedtime PRN Insomnia  senna 2 Tablet(s) Oral at bedtime PRN Constipation      RADIOLOGY & ADDITIONAL TESTS:    ACC: [id number] EXAM:  XR HIPS BI WITH PELV 2V   ORDERED BY: GIULIA CORDOVA     ACC: [id number] EXAM:  XR CHEST PORTABLE URGENT 1V   ORDERED BY: ORALIA MACIAS     ACC: [id number] EXAM:  XR FEMUR 2 VIEWS BI   ORDERED BY: GIULIA CORDOVA    PROCEDURE DATE:  11/22/2023          INTERPRETATION:  History:   Bilateral thigh pain after fall.    Technique: femur, 9 films. AP pelvis and bilateral hips 5 films    Comparison: None    Findings/  Impression:Mild degenerative changes are seen in the hips and knees. No   acute fracture. No dislocation. No displaced pelvic fracture. Bowel gas   overlies and obscures portions of the sacrum and iliac bone. . Vascular   calcifications are present.    ===============================  HISTORY: ; B/L thigh/quads pain, + fall this AM; r/o fx; rule out   pneumonia.  TECHNIQUE: Portable frontal view of the chest, 1 view.  COMPARISON 9/29/2019.  FINDINGS/IMPRESSION:  There is a loop recorder overlying the left chest.  HEART:difficult to access in this projection.  LUNGS: Mild interstitial edema is seen. Small left effusion. Prominent   pericardial fat pad, similar to prior. No definite evidence of pneumonia  BONES: sternotomy wires  .

## 2023-11-24 NOTE — PROGRESS NOTE ADULT - SUBJECTIVE AND OBJECTIVE BOX
Interval History:  11/24/23: No new complaints    MEDICATIONS  (STANDING):  atorvastatin 40 milliGRAM(s) Oral at bedtime  carbidopa/levodopa  25/100 1 Tablet(s) Oral <User Schedule>  enoxaparin Injectable 40 milliGRAM(s) SubCutaneous every 24 hours  gabapentin 100 milliGRAM(s) Oral <User Schedule>  gabapentin 200 milliGRAM(s) Oral <User Schedule>  influenza  Vaccine (HIGH DOSE) 0.7 milliLiter(s) IntraMuscular once  levETIRAcetam 250 milliGRAM(s) Oral <User Schedule>  losartan 25 milliGRAM(s) Oral daily  multivitamin/minerals 1 Tablet(s) Oral daily  polyethylene glycol 3350 17 Gram(s) Oral daily  thiamine 100 milliGRAM(s) Oral daily    MEDICATIONS  (PRN):  acetaminophen     Tablet .. 650 milliGRAM(s) Oral every 6 hours PRN Temp greater or equal to 38C (100.4F), Mild Pain (1 - 3)  melatonin 3 milliGRAM(s) Oral at bedtime PRN Insomnia  senna 2 Tablet(s) Oral at bedtime PRN Constipation      Allergies    No Known Allergies    Intolerances        PHYSICAL EXAM:  Vital Signs Last 24 Hrs  T(F): 97.3 (11-24-23 @ 07:44)  HR: 61 (11-24-23 @ 07:44)  BP: 120/64 (11-24-23 @ 07:44)  RR: 17 (11-24-23 @ 07:44)    GENERAL: NAD, well-groomed  HEAD:  Atraumatic, Normocephalic  Neuro:  Awake, alert, no aphasia  CN: PERRL, EOMI, no nystagmus, no facial weakness, tongue protrudes in the midline  motor: increased tone, + bradykinesia, no focal weakness  sensory: intact to light touch  coordination: b/l rest tremors  DTRs: symmetric, hypoactive  gait: not tested    LABS:                        12.4   10.80 )-----------( 156      ( 24 Nov 2023 06:46 )             36.3     11-24    140  |  107  |  20  ----------------------------<  112<H>  3.3<L>   |  29  |  0.92    Ca    9.0      24 Nov 2023 06:46    TPro  5.8<L>  /  Alb  2.8<L>  /  TBili  0.7  /  DBili  x   /  AST  38<H>  /  ALT  8<L>  /  AlkPhos  75  11-23    PT/INR - ( 23 Nov 2023 07:03 )   PT: 12.1 sec;   INR: 1.07 ratio         PTT - ( 23 Nov 2023 07:03 )  PTT:29.4 sec  Urinalysis Basic - ( 24 Nov 2023 06:46 )    Color: x / Appearance: x / SG: x / pH: x  Gluc: 112 mg/dL / Ketone: x  / Bili: x / Urobili: x   Blood: x / Protein: x / Nitrite: x   Leuk Esterase: x / RBC: x / WBC x   Sq Epi: x / Non Sq Epi: x / Bacteria: x        RADIOLOGY & ADDITIONAL STUDIES:

## 2023-11-24 NOTE — PROGRESS NOTE ADULT - ASSESSMENT
Mr. Miller  is a 89 yo male with a pmh/o anxiety, back pain, BPH, CAD s/p stent, RBBB, left foot drop, HLD, HTN, lumbar radiculopathy, lumbar stenosis, melanoma, s/p cholecystectomy, s/p CABG x3, s/p discectomy for herniated nucleus pulposus, syncopal epsiode x1 thought to be vasovagal vs autonomic dysregulation in setting of parkinson's disease, who presents from home due to inability to ambulate on own. Pt states he has a 24/7 HHA who usually helps him get to his walker. However, on the day of admission, he noticed he was more stiff than usual and that he is not strong enough to take care of himself. Pt states today he did not fall but rather "slid" himself down onto floor.  He failed an ambulation trial in the ED.    Difficulty walking  -Multifactorial  -Would likely benefit from rehab  -PT eval and likely [facility]    Parkinson's Disease  -He has significant rigidity and is on low dose carbidopa/levodopa, dose increased  -Continue Sinemet 25/100 1 tab four times per day  -Watch for any dizziness or orthostatic hypotension.    Neuropathy  -Chronic neuropathic pain, continue gabapentin 100-100-200. If needed can increase the dose to 200 mg TId    History of seizure  -He is on levetiracetam which can be continued    Dr. Condon will cover neurology beginning 11/25/23. Please call back if needed.

## 2023-11-25 LAB
ANION GAP SERPL CALC-SCNC: 5 MMOL/L — SIGNIFICANT CHANGE UP (ref 5–17)
ANION GAP SERPL CALC-SCNC: 5 MMOL/L — SIGNIFICANT CHANGE UP (ref 5–17)
BUN SERPL-MCNC: 21 MG/DL — SIGNIFICANT CHANGE UP (ref 7–23)
BUN SERPL-MCNC: 21 MG/DL — SIGNIFICANT CHANGE UP (ref 7–23)
CALCIUM SERPL-MCNC: 9.1 MG/DL — SIGNIFICANT CHANGE UP (ref 8.5–10.1)
CALCIUM SERPL-MCNC: 9.1 MG/DL — SIGNIFICANT CHANGE UP (ref 8.5–10.1)
CHLORIDE SERPL-SCNC: 106 MMOL/L — SIGNIFICANT CHANGE UP (ref 96–108)
CHLORIDE SERPL-SCNC: 106 MMOL/L — SIGNIFICANT CHANGE UP (ref 96–108)
CO2 SERPL-SCNC: 30 MMOL/L — SIGNIFICANT CHANGE UP (ref 22–31)
CO2 SERPL-SCNC: 30 MMOL/L — SIGNIFICANT CHANGE UP (ref 22–31)
CREAT SERPL-MCNC: 0.95 MG/DL — SIGNIFICANT CHANGE UP (ref 0.5–1.3)
CREAT SERPL-MCNC: 0.95 MG/DL — SIGNIFICANT CHANGE UP (ref 0.5–1.3)
EGFR: 76 ML/MIN/1.73M2 — SIGNIFICANT CHANGE UP
EGFR: 76 ML/MIN/1.73M2 — SIGNIFICANT CHANGE UP
GLUCOSE SERPL-MCNC: 113 MG/DL — HIGH (ref 70–99)
GLUCOSE SERPL-MCNC: 113 MG/DL — HIGH (ref 70–99)
HCT VFR BLD CALC: 38.7 % — LOW (ref 39–50)
HCT VFR BLD CALC: 38.7 % — LOW (ref 39–50)
HGB BLD-MCNC: 13 G/DL — SIGNIFICANT CHANGE UP (ref 13–17)
HGB BLD-MCNC: 13 G/DL — SIGNIFICANT CHANGE UP (ref 13–17)
MCHC RBC-ENTMCNC: 31.1 PG — SIGNIFICANT CHANGE UP (ref 27–34)
MCHC RBC-ENTMCNC: 31.1 PG — SIGNIFICANT CHANGE UP (ref 27–34)
MCHC RBC-ENTMCNC: 33.6 GM/DL — SIGNIFICANT CHANGE UP (ref 32–36)
MCHC RBC-ENTMCNC: 33.6 GM/DL — SIGNIFICANT CHANGE UP (ref 32–36)
MCV RBC AUTO: 92.6 FL — SIGNIFICANT CHANGE UP (ref 80–100)
MCV RBC AUTO: 92.6 FL — SIGNIFICANT CHANGE UP (ref 80–100)
PLATELET # BLD AUTO: 180 K/UL — SIGNIFICANT CHANGE UP (ref 150–400)
PLATELET # BLD AUTO: 180 K/UL — SIGNIFICANT CHANGE UP (ref 150–400)
POTASSIUM SERPL-MCNC: 3.6 MMOL/L — SIGNIFICANT CHANGE UP (ref 3.5–5.3)
POTASSIUM SERPL-MCNC: 3.6 MMOL/L — SIGNIFICANT CHANGE UP (ref 3.5–5.3)
POTASSIUM SERPL-SCNC: 3.6 MMOL/L — SIGNIFICANT CHANGE UP (ref 3.5–5.3)
POTASSIUM SERPL-SCNC: 3.6 MMOL/L — SIGNIFICANT CHANGE UP (ref 3.5–5.3)
RBC # BLD: 4.18 M/UL — LOW (ref 4.2–5.8)
RBC # BLD: 4.18 M/UL — LOW (ref 4.2–5.8)
RBC # FLD: 12.2 % — SIGNIFICANT CHANGE UP (ref 10.3–14.5)
RBC # FLD: 12.2 % — SIGNIFICANT CHANGE UP (ref 10.3–14.5)
SODIUM SERPL-SCNC: 141 MMOL/L — SIGNIFICANT CHANGE UP (ref 135–145)
SODIUM SERPL-SCNC: 141 MMOL/L — SIGNIFICANT CHANGE UP (ref 135–145)
WBC # BLD: 9.08 K/UL — SIGNIFICANT CHANGE UP (ref 3.8–10.5)
WBC # BLD: 9.08 K/UL — SIGNIFICANT CHANGE UP (ref 3.8–10.5)
WBC # FLD AUTO: 9.08 K/UL — SIGNIFICANT CHANGE UP (ref 3.8–10.5)
WBC # FLD AUTO: 9.08 K/UL — SIGNIFICANT CHANGE UP (ref 3.8–10.5)

## 2023-11-25 PROCEDURE — 99232 SBSQ HOSP IP/OBS MODERATE 35: CPT

## 2023-11-25 PROCEDURE — 73030 X-RAY EXAM OF SHOULDER: CPT | Mod: 26,RT

## 2023-11-25 RX ORDER — CYCLOBENZAPRINE HYDROCHLORIDE 10 MG/1
5 TABLET, FILM COATED ORAL THREE TIMES A DAY
Refills: 0 | Status: DISCONTINUED | OUTPATIENT
Start: 2023-11-25 | End: 2023-11-29

## 2023-11-25 RX ORDER — MULTIVIT WITH MIN/MFOLATE/K2 340-15/3 G
296 POWDER (GRAM) ORAL ONCE
Refills: 0 | Status: COMPLETED | OUTPATIENT
Start: 2023-11-25 | End: 2023-11-25

## 2023-11-25 RX ADMIN — CARBIDOPA AND LEVODOPA 1 TABLET(S): 25; 100 TABLET ORAL at 15:43

## 2023-11-25 RX ADMIN — GABAPENTIN 200 MILLIGRAM(S): 400 CAPSULE ORAL at 22:51

## 2023-11-25 RX ADMIN — CARBIDOPA AND LEVODOPA 1 TABLET(S): 25; 100 TABLET ORAL at 18:43

## 2023-11-25 RX ADMIN — CYCLOBENZAPRINE HYDROCHLORIDE 5 MILLIGRAM(S): 10 TABLET, FILM COATED ORAL at 15:42

## 2023-11-25 RX ADMIN — ENOXAPARIN SODIUM 40 MILLIGRAM(S): 100 INJECTION SUBCUTANEOUS at 15:43

## 2023-11-25 RX ADMIN — Medication 296 MILLILITER(S): at 15:42

## 2023-11-25 RX ADMIN — Medication 1 TABLET(S): at 09:53

## 2023-11-25 RX ADMIN — LEVETIRACETAM 250 MILLIGRAM(S): 250 TABLET, FILM COATED ORAL at 15:42

## 2023-11-25 RX ADMIN — POLYETHYLENE GLYCOL 3350 17 GRAM(S): 17 POWDER, FOR SOLUTION ORAL at 09:54

## 2023-11-25 RX ADMIN — LEVETIRACETAM 250 MILLIGRAM(S): 250 TABLET, FILM COATED ORAL at 06:44

## 2023-11-25 RX ADMIN — Medication 100 MILLIGRAM(S): at 09:53

## 2023-11-25 RX ADMIN — LEVETIRACETAM 250 MILLIGRAM(S): 250 TABLET, FILM COATED ORAL at 22:51

## 2023-11-25 RX ADMIN — LOSARTAN POTASSIUM 25 MILLIGRAM(S): 100 TABLET, FILM COATED ORAL at 09:53

## 2023-11-25 RX ADMIN — CARBIDOPA AND LEVODOPA 1 TABLET(S): 25; 100 TABLET ORAL at 11:52

## 2023-11-25 RX ADMIN — CARBIDOPA AND LEVODOPA 1 TABLET(S): 25; 100 TABLET ORAL at 06:44

## 2023-11-25 RX ADMIN — GABAPENTIN 100 MILLIGRAM(S): 400 CAPSULE ORAL at 15:41

## 2023-11-25 RX ADMIN — ATORVASTATIN CALCIUM 40 MILLIGRAM(S): 80 TABLET, FILM COATED ORAL at 22:51

## 2023-11-25 RX ADMIN — Medication 650 MILLIGRAM(S): at 09:53

## 2023-11-25 RX ADMIN — Medication 650 MILLIGRAM(S): at 03:53

## 2023-11-25 RX ADMIN — GABAPENTIN 100 MILLIGRAM(S): 400 CAPSULE ORAL at 06:44

## 2023-11-25 RX ADMIN — Medication 650 MILLIGRAM(S): at 10:53

## 2023-11-25 NOTE — PROGRESS NOTE ADULT - ASSESSMENT
11/25: Patient seen and evaluated at bedside.  Complaining of right shoulder pain and right neck pain.  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  States he used to get intra-articular corticosteroids injections with Dr. Ham.  Reports of constipation.  No nausea, vomiting no abdominal pain        Pt is a 89 yo male with a pmh/o syncope, CABG, GERD, Parkinson's disease,  LE neuropathy  admitted for:       #Frequent falls and difficulty ambulating due to Debility and deconditioning  in settings of Parkinson Dz and neuropathy, OA,  advanced age.   -No trauma . Hip/pelvis/femur:  XR neg for acute Fx   -US: neg for thigh hematoma   -CPK mildly elevated likely 2/2 immobility, will trend   -Fall precautions  -Supportive care   -PT: SUSAN  -SW eval will likely need placement     #Leukocytosis  -Resolved    #Parkinson's disease. LE neuropathy   -Continue Sinemet 25/101 tab 4 times daily  -Monitor for dizziness and orthostatic hypotension    # Neuropathy  -Chronic neuropathic pain  -Continue with gabapentin 100 – 100 – 200, if needed may increase the dose to 200 3 times daily    #History of seizure  -C/W Keppra    #HTN/HLD/CAD  -d/c  HCTZ due to dehydration   -c/w ARB interchanged to losartan   -lipitor     # Severe Pt calorie malnutrition   Liberalize diet to regular   check Vit levels   Supplement MVI and Thiamin   Pt supplements     #Right shoulder pain, right neck pain  -Trial of muscle relaxers  -F/U right shoulder x-ray  -Patient states he used to get intra-articular corticosteroid with Dr. Ham  -Follow-up with orthopedic as outpatient    #Constipation  -Bowel regimen    #DVT PPX:   Loveox [facility]Q    DISPLAY PLAN FREE TEXT

## 2023-11-25 NOTE — PROGRESS NOTE ADULT - SUBJECTIVE AND OBJECTIVE BOX
CC: Difficulty in walking, not elsewhere classified    HPI: 89 yo male with a pmh/o anxiety, back pain, BPH, CAD s/p stent, RBBB, left foot drop, HLD, HTN, lumbar radiculopathy, lumbar stenosis, melanoma, s/p cholecystectomy, s/p CABG x3, s/p discectomy for herniated nucleus pulposus, syncopal episode x1 thought to be vasovagal vs autonomic dysregulation in setting of parkinson's disease, who presents from home due to inability to ambulate on own. Pt states he has a 24/7 HHA who usually helps him get to his walker however today he noticed he was more stiff than usual and that he is not strong enough to take care of himself. Pt states today he did not fall but rather "slid" himself down onto floor. HHA at bedside states was unable to lift him back up from floor so called EMS for help. Pt states he feels "fine" other than "thigh soreness". Pt states he pressed against thighs on way down. In Ed pt failed ambulation trial and family and pt feel unsafe d/c as aide not going to be there tomorrow for holiday.      INTERVAL HPI/ OVERNIGHT EVENTS:  chart reviewed, Pt was seen and examined, confirmed history above, reports wa son the floor for >30mn before EMS was able to come get  him up. States lately stiffness and his le neuropathy got worse lately. Pt  denies CP or SOB, no fevers or chills. No cough     11/24: Pt seen and evaluated. Mild improvement in symptoms. No other acute complaints.     11/25: Patient seen and evaluated at bedside.  Complaining of right shoulder pain and right neck pain.  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  States he used to get intra-articular corticosteroids injections with Dr. Ham.  Reports of constipation.  No nausea, vomiting no abdominal pain    REVIEW OF SYSTEMS:  All other review of systems is negative unless indicated above.      Vital Signs Last 24 Hrs  T(C): 36.9 (25 Nov 2023 15:37), Max: 37 (24 Nov 2023 21:09)  T(F): 98.4 (25 Nov 2023 15:37), Max: 98.6 (24 Nov 2023 21:09)  HR: 82 (25 Nov 2023 15:37) (70 - 82)  BP: 130/71 (25 Nov 2023 15:37) (130/71 - 141/71)  RR: 17 (25 Nov 2023 15:37) (17 - 18)  SpO2: 97% (25 Nov 2023 15:37) (97% - 98%)    PHYSICAL EXAM:  General:  Frail elderly male,  in no acute distress  Eyes:  EOMI; conjunctiva and sclera clear  Head: Normocephalic; atraumatic  ENMT: No nasal discharge; airway clear  Neck: Supple; non tender; no masses  Respiratory: Decreased BS at bases. No wheezes  Cardiovascular: Regular rate and rhythm. S1 and S2 Normal;   Gastrointestinal: Soft non-tender non-distended; Normal bowel sounds  Genitourinary: No  suprapubic  tenderness  Extremities: No  edema, anterior shin scabs   Neurological: Alert and oriented x 2-3, + muscle stiffness, + resting tremors  Musculoskeletal: No joint edema or erythema, no  deformities, +ttp over right trapezius musculature    Psychiatric: Cooperative       LABS:                         13.0   9.08  )-----------( 180      ( 25 Nov 2023 07:43 )             38.7   11-25    141  |  106  |  21  ----------------------------<  113<H>  3.6   |  30  |  0.95    Ca    9.1      25 Nov 2023 07:43          Urinalysis Basic - ( 23 Nov 2023 07:03 )  Color: x / Appearance: x / SG: x / pH: x  Gluc: 102 mg/dL / Ketone: x  / Bili: x / Urobili: x   Blood: x / Protein: x / Nitrite: x   Leuk Esterase: x / RBC: x / WBC x   Sq Epi: x / Non Sq Epi: x / Bacteria: x    MEDICATIONS  (STANDING):  atorvastatin 40 milliGRAM(s) Oral at bedtime  carbidopa/levodopa  25/100 1 Tablet(s) Oral <User Schedule>  enoxaparin Injectable 40 milliGRAM(s) SubCutaneous every 24 hours  gabapentin 100 milliGRAM(s) Oral <User Schedule>  gabapentin 200 milliGRAM(s) Oral <User Schedule>  influenza  Vaccine (HIGH DOSE) 0.7 milliLiter(s) IntraMuscular once  levETIRAcetam 250 milliGRAM(s) Oral <User Schedule>  losartan 25 milliGRAM(s) Oral daily  multivitamin/minerals 1 Tablet(s) Oral daily  polyethylene glycol 3350 17 Gram(s) Oral daily  thiamine 100 milliGRAM(s) Oral daily    MEDICATIONS  (PRN):  acetaminophen     Tablet .. 650 milliGRAM(s) Oral every 6 hours PRN Temp greater or equal to 38C (100.4F), Mild Pain (1 - 3)  cyclobenzaprine 5 milliGRAM(s) Oral three times a day PRN Muscle Spasm  melatonin 3 milliGRAM(s) Oral at bedtime PRN Insomnia  senna 2 Tablet(s) Oral at bedtime PRN Constipation        RADIOLOGY & ADDITIONAL TESTS:    ACC: [id number] EXAM:  XR HIPS BI WITH PELV 2V   ORDERED BY: GIULIA CORDOVA     ACC: [id number] EXAM:  XR CHEST PORTABLE URGENT 1V   ORDERED BY: ORALIA MACIAS     ACC: [id number] EXAM:  XR FEMUR 2 VIEWS BI   ORDERED BY: GIULIA CORDOVA    PROCEDURE DATE:  11/22/2023          INTERPRETATION:  History:   Bilateral thigh pain after fall.    Technique: femur, 9 films. AP pelvis and bilateral hips 5 films    Comparison: None    Findings/  Impression:Mild degenerative changes are seen in the hips and knees. No   acute fracture. No dislocation. No displaced pelvic fracture. Bowel gas   overlies and obscures portions of the sacrum and iliac bone. . Vascular   calcifications are present.    ===============================  HISTORY: ; B/L thigh/quads pain, + fall this AM; r/o fx; rule out   pneumonia.  TECHNIQUE: Portable frontal view of the chest, 1 view.  COMPARISON 9/29/2019.  FINDINGS/IMPRESSION:  There is a loop recorder overlying the left chest.  HEART:difficult to access in this projection.  LUNGS: Mild interstitial edema is seen. Small left effusion. Prominent   pericardial fat pad, similar to prior. No definite evidence of pneumonia  BONES: sternotomy wires  .

## 2023-11-26 LAB
ANION GAP SERPL CALC-SCNC: 4 MMOL/L — LOW (ref 5–17)
ANION GAP SERPL CALC-SCNC: 4 MMOL/L — LOW (ref 5–17)
BUN SERPL-MCNC: 19 MG/DL — SIGNIFICANT CHANGE UP (ref 7–23)
BUN SERPL-MCNC: 19 MG/DL — SIGNIFICANT CHANGE UP (ref 7–23)
CALCIUM SERPL-MCNC: 8.8 MG/DL — SIGNIFICANT CHANGE UP (ref 8.5–10.1)
CALCIUM SERPL-MCNC: 8.8 MG/DL — SIGNIFICANT CHANGE UP (ref 8.5–10.1)
CHLORIDE SERPL-SCNC: 107 MMOL/L — SIGNIFICANT CHANGE UP (ref 96–108)
CHLORIDE SERPL-SCNC: 107 MMOL/L — SIGNIFICANT CHANGE UP (ref 96–108)
CK SERPL-CCNC: 83 U/L — SIGNIFICANT CHANGE UP (ref 26–308)
CK SERPL-CCNC: 83 U/L — SIGNIFICANT CHANGE UP (ref 26–308)
CO2 SERPL-SCNC: 30 MMOL/L — SIGNIFICANT CHANGE UP (ref 22–31)
CO2 SERPL-SCNC: 30 MMOL/L — SIGNIFICANT CHANGE UP (ref 22–31)
CREAT SERPL-MCNC: 0.89 MG/DL — SIGNIFICANT CHANGE UP (ref 0.5–1.3)
CREAT SERPL-MCNC: 0.89 MG/DL — SIGNIFICANT CHANGE UP (ref 0.5–1.3)
EGFR: 81 ML/MIN/1.73M2 — SIGNIFICANT CHANGE UP
EGFR: 81 ML/MIN/1.73M2 — SIGNIFICANT CHANGE UP
GLUCOSE SERPL-MCNC: 127 MG/DL — HIGH (ref 70–99)
GLUCOSE SERPL-MCNC: 127 MG/DL — HIGH (ref 70–99)
HCT VFR BLD CALC: 37.1 % — LOW (ref 39–50)
HCT VFR BLD CALC: 37.1 % — LOW (ref 39–50)
HGB BLD-MCNC: 12.6 G/DL — LOW (ref 13–17)
HGB BLD-MCNC: 12.6 G/DL — LOW (ref 13–17)
MCHC RBC-ENTMCNC: 31.1 PG — SIGNIFICANT CHANGE UP (ref 27–34)
MCHC RBC-ENTMCNC: 31.1 PG — SIGNIFICANT CHANGE UP (ref 27–34)
MCHC RBC-ENTMCNC: 34 GM/DL — SIGNIFICANT CHANGE UP (ref 32–36)
MCHC RBC-ENTMCNC: 34 GM/DL — SIGNIFICANT CHANGE UP (ref 32–36)
MCV RBC AUTO: 91.6 FL — SIGNIFICANT CHANGE UP (ref 80–100)
MCV RBC AUTO: 91.6 FL — SIGNIFICANT CHANGE UP (ref 80–100)
PLATELET # BLD AUTO: 183 K/UL — SIGNIFICANT CHANGE UP (ref 150–400)
PLATELET # BLD AUTO: 183 K/UL — SIGNIFICANT CHANGE UP (ref 150–400)
POTASSIUM SERPL-MCNC: 4 MMOL/L — SIGNIFICANT CHANGE UP (ref 3.5–5.3)
POTASSIUM SERPL-MCNC: 4 MMOL/L — SIGNIFICANT CHANGE UP (ref 3.5–5.3)
POTASSIUM SERPL-SCNC: 4 MMOL/L — SIGNIFICANT CHANGE UP (ref 3.5–5.3)
POTASSIUM SERPL-SCNC: 4 MMOL/L — SIGNIFICANT CHANGE UP (ref 3.5–5.3)
RBC # BLD: 4.05 M/UL — LOW (ref 4.2–5.8)
RBC # BLD: 4.05 M/UL — LOW (ref 4.2–5.8)
RBC # FLD: 12.2 % — SIGNIFICANT CHANGE UP (ref 10.3–14.5)
RBC # FLD: 12.2 % — SIGNIFICANT CHANGE UP (ref 10.3–14.5)
SODIUM SERPL-SCNC: 141 MMOL/L — SIGNIFICANT CHANGE UP (ref 135–145)
SODIUM SERPL-SCNC: 141 MMOL/L — SIGNIFICANT CHANGE UP (ref 135–145)
WBC # BLD: 9.59 K/UL — SIGNIFICANT CHANGE UP (ref 3.8–10.5)
WBC # BLD: 9.59 K/UL — SIGNIFICANT CHANGE UP (ref 3.8–10.5)
WBC # FLD AUTO: 9.59 K/UL — SIGNIFICANT CHANGE UP (ref 3.8–10.5)
WBC # FLD AUTO: 9.59 K/UL — SIGNIFICANT CHANGE UP (ref 3.8–10.5)

## 2023-11-26 PROCEDURE — 99232 SBSQ HOSP IP/OBS MODERATE 35: CPT

## 2023-11-26 RX ORDER — MULTIVIT WITH MIN/MFOLATE/K2 340-15/3 G
296 POWDER (GRAM) ORAL ONCE
Refills: 0 | Status: COMPLETED | OUTPATIENT
Start: 2023-11-26 | End: 2023-11-26

## 2023-11-26 RX ADMIN — Medication 1 TABLET(S): at 09:43

## 2023-11-26 RX ADMIN — ENOXAPARIN SODIUM 40 MILLIGRAM(S): 100 INJECTION SUBCUTANEOUS at 15:04

## 2023-11-26 RX ADMIN — CARBIDOPA AND LEVODOPA 1 TABLET(S): 25; 100 TABLET ORAL at 06:04

## 2023-11-26 RX ADMIN — POLYETHYLENE GLYCOL 3350 17 GRAM(S): 17 POWDER, FOR SOLUTION ORAL at 09:43

## 2023-11-26 RX ADMIN — LEVETIRACETAM 250 MILLIGRAM(S): 250 TABLET, FILM COATED ORAL at 21:52

## 2023-11-26 RX ADMIN — CARBIDOPA AND LEVODOPA 1 TABLET(S): 25; 100 TABLET ORAL at 11:17

## 2023-11-26 RX ADMIN — ATORVASTATIN CALCIUM 40 MILLIGRAM(S): 80 TABLET, FILM COATED ORAL at 21:52

## 2023-11-26 RX ADMIN — Medication 296 MILLILITER(S): at 19:14

## 2023-11-26 RX ADMIN — GABAPENTIN 100 MILLIGRAM(S): 400 CAPSULE ORAL at 15:05

## 2023-11-26 RX ADMIN — CARBIDOPA AND LEVODOPA 1 TABLET(S): 25; 100 TABLET ORAL at 18:18

## 2023-11-26 RX ADMIN — GABAPENTIN 200 MILLIGRAM(S): 400 CAPSULE ORAL at 21:51

## 2023-11-26 RX ADMIN — Medication 100 MILLIGRAM(S): at 09:43

## 2023-11-26 RX ADMIN — LEVETIRACETAM 250 MILLIGRAM(S): 250 TABLET, FILM COATED ORAL at 06:04

## 2023-11-26 RX ADMIN — CARBIDOPA AND LEVODOPA 1 TABLET(S): 25; 100 TABLET ORAL at 15:05

## 2023-11-26 RX ADMIN — LOSARTAN POTASSIUM 25 MILLIGRAM(S): 100 TABLET, FILM COATED ORAL at 09:43

## 2023-11-26 RX ADMIN — GABAPENTIN 100 MILLIGRAM(S): 400 CAPSULE ORAL at 06:05

## 2023-11-26 RX ADMIN — LEVETIRACETAM 250 MILLIGRAM(S): 250 TABLET, FILM COATED ORAL at 15:05

## 2023-11-26 NOTE — PROGRESS NOTE ADULT - SUBJECTIVE AND OBJECTIVE BOX
CC: Difficulty in walking, not elsewhere classified    HPI: 89 yo male with a pmh/o anxiety, back pain, BPH, CAD s/p stent, RBBB, left foot drop, HLD, HTN, lumbar radiculopathy, lumbar stenosis, melanoma, s/p cholecystectomy, s/p CABG x3, s/p discectomy for herniated nucleus pulposus, syncopal episode x1 thought to be vasovagal vs autonomic dysregulation in setting of parkinson's disease, who presents from home due to inability to ambulate on own. Pt states he has a 24/7 HHA who usually helps him get to his walker however today he noticed he was more stiff than usual and that he is not strong enough to take care of himself. Pt states today he did not fall but rather "slid" himself down onto floor. HHA at bedside states was unable to lift him back up from floor so called EMS for help. Pt states he feels "fine" other than "thigh soreness". Pt states he pressed against thighs on way down. In Ed pt failed ambulation trial and family and pt feel unsafe d/c as aide not going to be there tomorrow for holiday.      INTERVAL HPI/ OVERNIGHT EVENTS:  chart reviewed, Pt was seen and examined, confirmed history above, reports wa son the floor for >30mn before EMS was able to come get  him up. States lately stiffness and his le neuropathy got worse lately. Pt  denies CP or SOB, no fevers or chills. No cough     11/24: Pt seen and evaluated. Mild improvement in symptoms. No other acute complaints.     11/25: Patient seen and evaluated at bedside.  Complaining of right shoulder pain and right neck pain.  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  States he used to get intra-articular corticosteroids injections with Dr. Ham.  Reports of constipation.  No nausea, vomiting no abdominal pain    11/26: Still with right shoulder pain and neck pain, mild improvement. No BM yet     REVIEW OF SYSTEMS:  All other review of systems is negative unless indicated above.      Vital Signs Last 24 Hrs  T(C): 36.4 (26 Nov 2023 16:12), Max: 36.8 (26 Nov 2023 08:33)  T(F): 97.5 (26 Nov 2023 16:12), Max: 98.2 (26 Nov 2023 08:33)  HR: 76 (26 Nov 2023 16:12) (64 - 76)  BP: 124/64 (26 Nov 2023 16:12) (109/61 - 124/64)  RR: 18 (26 Nov 2023 16:12) (17 - 18)  SpO2: 98% (26 Nov 2023 16:12) (93% - 98%)        PHYSICAL EXAM:  General:  Frail elderly male,  in no acute distress  Eyes:  EOMI; conjunctiva and sclera clear  Head: Normocephalic; atraumatic  ENMT: No nasal discharge; airway clear  Neck: Supple; non tender; no masses  Respiratory: Decreased BS at bases. No wheezes  Cardiovascular: Regular rate and rhythm. S1 and S2 Normal;   Gastrointestinal: Soft non-tender non-distended; Normal bowel sounds  Genitourinary: No  suprapubic  tenderness  Extremities: No  edema, anterior shin scabs   Neurological: Alert and oriented x 2-3, + muscle stiffness, + resting tremors  Musculoskeletal: No joint edema or erythema, no  deformities, +ttp over right trapezius musculature    Psychiatric: Cooperative       LABS:                         12.6   9.59  )-----------( 183      ( 26 Nov 2023 07:08 )             37.1   11-26    141  |  107  |  19  ----------------------------<  127<H>  4.0   |  30  |  0.89    Ca    8.8      26 Nov 2023 07:08                     Urinalysis Basic - ( 23 Nov 2023 07:03 )  Color: x / Appearance: x / SG: x / pH: x  Gluc: 102 mg/dL / Ketone: x  / Bili: x / Urobili: x   Blood: x / Protein: x / Nitrite: x   Leuk Esterase: x / RBC: x / WBC x   Sq Epi: x / Non Sq Epi: x / Bacteria: x    MEDICATIONS  (STANDING):  atorvastatin 40 milliGRAM(s) Oral at bedtime  carbidopa/levodopa  25/100 1 Tablet(s) Oral <User Schedule>  enoxaparin Injectable 40 milliGRAM(s) SubCutaneous every 24 hours  gabapentin 100 milliGRAM(s) Oral <User Schedule>  gabapentin 200 milliGRAM(s) Oral <User Schedule>  influenza  Vaccine (HIGH DOSE) 0.7 milliLiter(s) IntraMuscular once  levETIRAcetam 250 milliGRAM(s) Oral <User Schedule>  losartan 25 milliGRAM(s) Oral daily  multivitamin/minerals 1 Tablet(s) Oral daily  polyethylene glycol 3350 17 Gram(s) Oral daily  thiamine 100 milliGRAM(s) Oral daily    MEDICATIONS  (PRN):  acetaminophen     Tablet .. 650 milliGRAM(s) Oral every 6 hours PRN Temp greater or equal to 38C (100.4F), Mild Pain (1 - 3)  cyclobenzaprine 5 milliGRAM(s) Oral three times a day PRN Muscle Spasm  melatonin 3 milliGRAM(s) Oral at bedtime PRN Insomnia  senna 2 Tablet(s) Oral at bedtime PRN Constipation          RADIOLOGY & ADDITIONAL TESTS:    ACC: [id number] EXAM:  XR HIPS BI WITH PELV 2V   ORDERED BY: GIULIA CORDOVA     ACC: [id number] EXAM:  XR CHEST PORTABLE URGENT 1V   ORDERED BY: ORALIA MACIAS     ACC: [id number] EXAM:  XR FEMUR 2 VIEWS BI   ORDERED BY: GIULIA CORDOVA    PROCEDURE DATE:  11/22/2023          INTERPRETATION:  History:   Bilateral thigh pain after fall.    Technique: femur, 9 films. AP pelvis and bilateral hips 5 films    Comparison: None    Findings/  Impression:Mild degenerative changes are seen in the hips and knees. No   acute fracture. No dislocation. No displaced pelvic fracture. Bowel gas   overlies and obscures portions of the sacrum and iliac bone. . Vascular   calcifications are present.    ===============================  HISTORY: ; B/L thigh/quads pain, + fall this AM; r/o fx; rule out   pneumonia.  TECHNIQUE: Portable frontal view of the chest, 1 view.  COMPARISON 9/29/2019.  FINDINGS/IMPRESSION:  There is a loop recorder overlying the left chest.  HEART:difficult to access in this projection.  LUNGS: Mild interstitial edema is seen. Small left effusion. Prominent   pericardial fat pad, similar to prior. No definite evidence of pneumonia  BONES: sternotomy wires  .  < from: Xray Shoulder 2 Views, Right (11.25.23 @ 09:51) >  INTERPRETATION:  Right shoulder. 3 views. 4 images. Patient has local   pain.    There is diffuse mild degeneration around the corners of the humeral   head. There is acromioclavicular arthropathy. No fracture.    IMPRESSION: Degeneration as above.    < end of copied text >

## 2023-11-26 NOTE — PROGRESS NOTE ADULT - ASSESSMENT
11/25: Patient seen and evaluated at bedside.  Complaining of right shoulder pain and right neck pain.  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  States he used to get intra-articular corticosteroids injections with Dr. Ham.  Reports of constipation.  No nausea, vomiting no abdominal pain        Pt is a 89 yo male with a pmh/o syncope, CABG, GERD, Parkinson's disease,  LE neuropathy  admitted for:       #Frequent falls and difficulty ambulating due to Debility and deconditioning  in settings of Parkinson Dz and neuropathy, OA,  advanced age.   -No trauma . Hip/pelvis/femur:  XR neg for acute Fx   -US: neg for thigh hematoma   -CPK mildly elevated likely 2/2 immobility, will trend   -Fall precautions  -Supportive care   -PT: [facility]  -SW eval will likely need placement     #Leukocytosis  -Resolved    #Parkinson's disease. LE neuropathy   -Continue Sinemet 25/101 tab 4 times daily  -Monitor for dizziness and orthostatic hypotension    # Neuropathy  -Chronic neuropathic pain  -Continue with gabapentin 100 – 100 – 200, if needed may increase the dose to 200 3 times daily    #History of seizure  -C/W Keppra    #HTN/HLD/CAD  -d/c  HCTZ due to dehydration   -c/w ARB interchanged to losartan   -lipitor     # Severe Pt calorie malnutrition   Liberalize diet to regular   check Vit levels   Supplement MVI and Thiamin   Pt supplements     #Right shoulder pain, right neck pain  -Trial of muscle relaxers  -XR right shoulder: +OA  -Patient states he used to get intra-articular corticosteroid with Dr. Ham  -Follow-up with orthopedic as outpatient    #Constipation  -Bowel regimen  -document BM    #DVT PPX:   Loveox SQ    11/25: Patient seen and evaluated at bedside.  Complaining of right shoulder pain and right neck pain.  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  States he used to get intra-articular corticosteroids injections with Dr. Ham.  Reports of constipation.  No nausea, vomiting no abdominal pain        Pt is a 89 yo male with a pmh/o syncope, CABG, GERD, Parkinson's disease,  LE neuropathy  admitted for:       #Frequent falls and difficulty ambulating due to Debility and deconditioning  in settings of Parkinson Dz and neuropathy, OA,  advanced age.   -No trauma . Hip/pelvis/femur:  XR neg for acute Fx   -US: neg for thigh hematoma   -CPK mildly elevated likely 2/2 immobility, will trend   -Fall precautions  -Supportive care   -PT: [facility]  - eval will likely need placement     #Leukocytosis  -Resolved    #Parkinson's disease. LE neuropathy   -Continue Sinemet 25/101 tab 4 times daily  -Monitor for dizziness and orthostatic hypotension    # Neuropathy  -Chronic neuropathic pain  -Continue with gabapentin 100 – 100 – 200, if needed may increase the dose to 200 3 times daily    #History of seizure  -C/W Keppra    #HTN/HLD/CAD  -d/c  HCTZ due to dehydration   -c/w ARB interchanged to losartan   -lipitor     # Severe Pt calorie malnutrition   Liberalize diet to regular   check Vit levels   Supplement MVI and Thiamin   Pt supplements     #Right shoulder pain, right neck pain  -Trial of muscle relaxers  -XR right shoulder: +OA  -Patient states he used to get intra-articular corticosteroid with Dr. Ham  -Follow-up with orthopedic as outpatient    #Constipation  -Bowel regimen  -document BM    #DVT PPX:   Lovenox SQ     #Dispo  -discharge plan to [facility]

## 2023-11-27 PROCEDURE — 99232 SBSQ HOSP IP/OBS MODERATE 35: CPT

## 2023-11-27 PROCEDURE — 74018 RADEX ABDOMEN 1 VIEW: CPT | Mod: 26

## 2023-11-27 RX ADMIN — ENOXAPARIN SODIUM 40 MILLIGRAM(S): 100 INJECTION SUBCUTANEOUS at 14:38

## 2023-11-27 RX ADMIN — POLYETHYLENE GLYCOL 3350 17 GRAM(S): 17 POWDER, FOR SOLUTION ORAL at 10:38

## 2023-11-27 RX ADMIN — GABAPENTIN 100 MILLIGRAM(S): 400 CAPSULE ORAL at 06:07

## 2023-11-27 RX ADMIN — GABAPENTIN 100 MILLIGRAM(S): 400 CAPSULE ORAL at 14:42

## 2023-11-27 RX ADMIN — GABAPENTIN 200 MILLIGRAM(S): 400 CAPSULE ORAL at 22:35

## 2023-11-27 RX ADMIN — Medication 100 MILLIGRAM(S): at 10:38

## 2023-11-27 RX ADMIN — CARBIDOPA AND LEVODOPA 1 TABLET(S): 25; 100 TABLET ORAL at 19:19

## 2023-11-27 RX ADMIN — LEVETIRACETAM 250 MILLIGRAM(S): 250 TABLET, FILM COATED ORAL at 14:37

## 2023-11-27 RX ADMIN — CARBIDOPA AND LEVODOPA 1 TABLET(S): 25; 100 TABLET ORAL at 14:37

## 2023-11-27 RX ADMIN — LEVETIRACETAM 250 MILLIGRAM(S): 250 TABLET, FILM COATED ORAL at 06:07

## 2023-11-27 RX ADMIN — LOSARTAN POTASSIUM 25 MILLIGRAM(S): 100 TABLET, FILM COATED ORAL at 10:38

## 2023-11-27 RX ADMIN — LEVETIRACETAM 250 MILLIGRAM(S): 250 TABLET, FILM COATED ORAL at 22:35

## 2023-11-27 RX ADMIN — CYCLOBENZAPRINE HYDROCHLORIDE 5 MILLIGRAM(S): 10 TABLET, FILM COATED ORAL at 14:59

## 2023-11-27 RX ADMIN — CARBIDOPA AND LEVODOPA 1 TABLET(S): 25; 100 TABLET ORAL at 06:07

## 2023-11-27 RX ADMIN — Medication 1 TABLET(S): at 10:38

## 2023-11-27 RX ADMIN — CARBIDOPA AND LEVODOPA 1 TABLET(S): 25; 100 TABLET ORAL at 10:38

## 2023-11-27 RX ADMIN — ATORVASTATIN CALCIUM 40 MILLIGRAM(S): 80 TABLET, FILM COATED ORAL at 22:35

## 2023-11-27 NOTE — PROGRESS NOTE ADULT - ASSESSMENT
11/27: Patient seen and evaluated at bedside.  Complaining of right shoulder pain and right neck pain.  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  States he used to get intra-articular corticosteroids injections with Dr. Ham.  Reports of constipation, bowel movement today 11/27.  No nausea, vomiting no abdominal pain        Pt is a 89 yo male with a pmh/o syncope, CABG, GERD, Parkinson's disease,  LE neuropathy  admitted for:       #Frequent falls and difficulty ambulating due to Debility and deconditioning  in settings of Parkinson Dz and neuropathy, OA,  advanced age.   -No trauma . Hip/pelvis/femur:  XR neg for acute Fx   -US: neg for thigh hematoma   -CPK mildly elevated likely 2/2 immobility, will trend   -Fall precautions  -Supportive care   -PT: [facility]  - eval will likely need placement     #Leukocytosis  -Resolved    #Parkinson's disease. LE neuropathy   -Continue Sinemet 25/101 tab 4 times daily  -Monitor for dizziness and orthostatic hypotension    # Neuropathy  -Chronic neuropathic pain  -Continue with gabapentin 100 – 100 – 200, if needed may increase the dose to 200 3 times daily    #History of seizure  -C/W Keppra    #HTN/HLD/CAD  -d/c  HCTZ due to dehydration   -c/w ARB interchanged to losartan   -lipitor     # Severe Pt calorie malnutrition   Liberalize diet to regular   check Vit levels   Supplement MVI and Thiamin   Pt supplements     #Right shoulder pain, right neck pain  -Trial of muscle relaxers  -XR right shoulder: +OA  -Patient states he used to get intra-articular corticosteroid with Dr. Ham  -Follow-up with orthopedic as outpatient    #Constipation  -Bowel regimen  -document BM    #DVT PPX:   Lovenox SQ     #Dispo  -discharge plan to [facility]. Currently no beds at preferred [facility] in [place], will discuss w/ family for possible temporary placement in [place] while awaiting bed in [place]

## 2023-11-27 NOTE — PROGRESS NOTE ADULT - SUBJECTIVE AND OBJECTIVE BOX
CC: Difficulty in walking, not elsewhere classified    HPI: 89 yo male with a pmh/o anxiety, back pain, BPH, CAD s/p stent, RBBB, left foot drop, HLD, HTN, lumbar radiculopathy, lumbar stenosis, melanoma, s/p cholecystectomy, s/p CABG x3, s/p discectomy for herniated nucleus pulposus, syncopal episode x1 thought to be vasovagal vs autonomic dysregulation in setting of parkinson's disease, who presents from home due to inability to ambulate on own. Pt states he has a 24/7 HHA who usually helps him get to his walker however today he noticed he was more stiff than usual and that he is not strong enough to take care of himself. Pt states today he did not fall but rather "slid" himself down onto floor. HHA at bedside states was unable to lift him back up from floor so called EMS for help. Pt states he feels "fine" other than "thigh soreness". Pt states he pressed against thighs on way down. In Ed pt failed ambulation trial and family and pt feel unsafe d/c as aide not going to be there tomorrow for holiday.      INTERVAL HPI/ OVERNIGHT EVENTS:  chart reviewed, Pt was seen and examined, confirmed history above, reports wa son the floor for >30mn before EMS was able to come get  him up. States lately stiffness and his le neuropathy got worse lately. Pt  denies CP or SOB, no fevers or chills. No cough     11/24: Pt seen and evaluated. Mild improvement in symptoms. No other acute complaints.     11/25: Patient seen and evaluated at bedside.  Complaining of right shoulder pain and right neck pain.  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  States he used to get intra-articular corticosteroids injections with Dr. Ham.  Reports of constipation.  No nausea, vomiting no abdominal pain    11/26: Still with right shoulder pain and neck pain, mild improvement. No BM yet    11/27: Patient seen and evaluated. Still with right shoulder and neck pain, no improvement. BM this morning 11/27, pt still complaining of constipation w/ intermittent crampy abdominal pain. Denies N/V.    REVIEW OF SYSTEMS:  All other review of systems is negative unless indicated above.      Vital Signs Last 24 Hrs  T(C): 36.4 (26 Nov 2023 16:12), Max: 36.8 (26 Nov 2023 08:33)  T(F): 97.5 (26 Nov 2023 16:12), Max: 98.2 (26 Nov 2023 08:33)  HR: 76 (26 Nov 2023 16:12) (64 - 76)  BP: 124/64 (26 Nov 2023 16:12) (109/61 - 124/64)  RR: 18 (26 Nov 2023 16:12) (17 - 18)  SpO2: 98% (26 Nov 2023 16:12) (93% - 98%)        PHYSICAL EXAM:  General:  Frail elderly male,  in no acute distress  Eyes:  EOMI; conjunctiva and sclera clear  Head: Normocephalic; atraumatic  ENMT: No nasal discharge; airway clear  Neck: Supple; non tender; no masses  Respiratory: Decreased BS at bases. No wheezes  Cardiovascular: Regular rate and rhythm. S1 and S2 Normal;   Gastrointestinal: Soft non-tender non-distended; Normal bowel sounds  Genitourinary: No  suprapubic  tenderness  Extremities: No  edema, anterior shin scabs   Neurological: Alert and oriented x 2-3, + muscle stiffness, + resting tremors  Musculoskeletal: No joint edema or erythema, no  deformities, +ttp over right trapezius musculature    Psychiatric: Cooperative       LABS:                                    12.6   9.59  )-----------( 183      ( 26 Nov 2023 07:08 )             37.1   11-26    141  |  107  |  19  ----------------------------<  127<H>  4.0   |  30  |  0.89    Ca    8.8      26 Nov 2023 07:08        Urinalysis Basic - ( 23 Nov 2023 07:03 )  Color: x / Appearance: x / SG: x / pH: x  Gluc: 102 mg/dL / Ketone: x  / Bili: x / Urobili: x   Blood: x / Protein: x / Nitrite: x   Leuk Esterase: x / RBC: x / WBC x   Sq Epi: x / Non Sq Epi: x / Bacteria: x    MEDICATIONS  (STANDING):  atorvastatin 40 milliGRAM(s) Oral at bedtime  carbidopa/levodopa  25/100 1 Tablet(s) Oral <User Schedule>  enoxaparin Injectable 40 milliGRAM(s) SubCutaneous every 24 hours  gabapentin 100 milliGRAM(s) Oral <User Schedule>  gabapentin 200 milliGRAM(s) Oral <User Schedule>  influenza  Vaccine (HIGH DOSE) 0.7 milliLiter(s) IntraMuscular once  levETIRAcetam 250 milliGRAM(s) Oral <User Schedule>  losartan 25 milliGRAM(s) Oral daily  multivitamin/minerals 1 Tablet(s) Oral daily  polyethylene glycol 3350 17 Gram(s) Oral daily  thiamine 100 milliGRAM(s) Oral daily    MEDICATIONS  (PRN):  acetaminophen     Tablet .. 650 milliGRAM(s) Oral every 6 hours PRN Temp greater or equal to 38C (100.4F), Mild Pain (1 - 3)  cyclobenzaprine 5 milliGRAM(s) Oral three times a day PRN Muscle Spasm  melatonin 3 milliGRAM(s) Oral at bedtime PRN Insomnia  senna 2 Tablet(s) Oral at bedtime PRN Constipation          RADIOLOGY & ADDITIONAL TESTS:    ACC: [id number] EXAM:  XR HIPS BI WITH PELV 2V   ORDERED BY: GIULIA CORDOVA     ACC: [id number] EXAM:  XR CHEST PORTABLE URGENT 1V   ORDERED BY: ORALIA MACIAS     ACC: [id number] EXAM:  XR FEMUR 2 VIEWS BI   ORDERED BY: GIULIA CORDOVA    PROCEDURE DATE:  11/22/2023          INTERPRETATION:  History:   Bilateral thigh pain after fall.    Technique: femur, 9 films. AP pelvis and bilateral hips 5 films    Comparison: None    Findings/  Impression:Mild degenerative changes are seen in the hips and knees. No   acute fracture. No dislocation. No displaced pelvic fracture. Bowel gas   overlies and obscures portions of the sacrum and iliac bone. . Vascular   calcifications are present.    ===============================  HISTORY: ; B/L thigh/quads pain, + fall this AM; r/o fx; rule out   pneumonia.  TECHNIQUE: Portable frontal view of the chest, 1 view.  COMPARISON 9/29/2019.  FINDINGS/IMPRESSION:  There is a loop recorder overlying the left chest.  HEART:difficult to access in this projection.  LUNGS: Mild interstitial edema is seen. Small left effusion. Prominent   pericardial fat pad, similar to prior. No definite evidence of pneumonia  BONES: sternotomy wires  .  < from: Xray Shoulder 2 Views, Right (11.25.23 @ 09:51) >  INTERPRETATION:  Right shoulder. 3 views. 4 images. Patient has local   pain.    There is diffuse mild degeneration around the corners of the humeral   head. There is acromioclavicular arthropathy. No fracture.    IMPRESSION: Degeneration as above.    < end of copied text >         CC: Difficulty in walking, not elsewhere classified    HPI: 89 yo male with a pmh/o anxiety, back pain, BPH, CAD s/p stent, RBBB, left foot drop, HLD, HTN, lumbar radiculopathy, lumbar stenosis, melanoma, s/p cholecystectomy, s/p CABG x3, s/p discectomy for herniated nucleus pulposus, syncopal episode x1 thought to be vasovagal vs autonomic dysregulation in setting of parkinson's disease, who presents from home due to inability to ambulate on own. Pt states he has a 24/7 HHA who usually helps him get to his walker however today he noticed he was more stiff than usual and that he is not strong enough to take care of himself. Pt states today he did not fall but rather "slid" himself down onto floor. HHA at bedside states was unable to lift him back up from floor so called EMS for help. Pt states he feels "fine" other than "thigh soreness". Pt states he pressed against thighs on way down. In Ed pt failed ambulation trial and family and pt feel unsafe d/c as aide not going to be there tomorrow for holiday.      INTERVAL HPI/ OVERNIGHT EVENTS:  chart reviewed, Pt was seen and examined, confirmed history above, reports wa son the floor for >30mn before EMS was able to come get  him up. States lately stiffness and his le neuropathy got worse lately. Pt  denies CP or SOB, no fevers or chills. No cough     11/24: Pt seen and evaluated. Mild improvement in symptoms. No other acute complaints.     11/25: Patient seen and evaluated at bedside.  Complaining of right shoulder pain and right neck pain.  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  States he used to get intra-articular corticosteroids injections with Dr. Ham.  Reports of constipation.  No nausea, vomiting no abdominal pain    11/26: Still with right shoulder pain and neck pain, mild improvement. No BM yet    11/27: Patient seen and evaluated. Still with right shoulder and neck pain, no improvement. BM this morning 11/27, pt still complaining of constipation w/ intermittent crampy abdominal pain. Denies N/V.    REVIEW OF SYSTEMS:  All other review of systems is negative unless indicated above.      Vital Signs Last 24 Hrs  Vital Signs Last 24 Hrs  T(C): 36.5 (27 Nov 2023 08:06), Max: 36.5 (27 Nov 2023 08:06)  T(F): 97.7 (27 Nov 2023 08:06), Max: 97.7 (27 Nov 2023 08:06)  HR: 77 (27 Nov 2023 08:06) (76 - 81)  BP: 120/69 (27 Nov 2023 08:06) (105/61 - 124/64)  BP(mean): --  RR: 18 (27 Nov 2023 08:06) (18 - 18)  SpO2: 94% (27 Nov 2023 08:06) (94% - 98%)    Parameters below as of 27 Nov 2023 08:06  Patient On (Oxygen Delivery Method): room air        PHYSICAL EXAM:  General:  Frail elderly male,  in no acute distress  Eyes:  EOMI; conjunctiva and sclera clear  Head: Normocephalic; atraumatic  ENMT: No nasal discharge; airway clear  Neck: Supple; non tender; no masses  Respiratory: Decreased BS at bases. No wheezes  Cardiovascular: Regular rate and rhythm. S1 and S2 Normal;   Gastrointestinal: Soft non-tender non-distended; Normal bowel sounds  Genitourinary: No  suprapubic  tenderness  Extremities: No  edema, anterior shin scabs   Neurological: Alert and oriented x 2-3, + muscle stiffness, + resting tremors  Musculoskeletal: No joint edema or erythema, no  deformities, +ttp over right trapezius musculature    Psychiatric: Cooperative       LABS:                                    12.6   9.59  )-----------( 183      ( 26 Nov 2023 07:08 )             37.1   11-26    141  |  107  |  19  ----------------------------<  127<H>  4.0   |  30  |  0.89    Ca    8.8      26 Nov 2023 07:08        Urinalysis Basic - ( 23 Nov 2023 07:03 )  Color: x / Appearance: x / SG: x / pH: x  Gluc: 102 mg/dL / Ketone: x  / Bili: x / Urobili: x   Blood: x / Protein: x / Nitrite: x   Leuk Esterase: x / RBC: x / WBC x   Sq Epi: x / Non Sq Epi: x / Bacteria: x    MEDICATIONS  (STANDING):  atorvastatin 40 milliGRAM(s) Oral at bedtime  carbidopa/levodopa  25/100 1 Tablet(s) Oral <User Schedule>  enoxaparin Injectable 40 milliGRAM(s) SubCutaneous every 24 hours  gabapentin 100 milliGRAM(s) Oral <User Schedule>  gabapentin 200 milliGRAM(s) Oral <User Schedule>  influenza  Vaccine (HIGH DOSE) 0.7 milliLiter(s) IntraMuscular once  levETIRAcetam 250 milliGRAM(s) Oral <User Schedule>  losartan 25 milliGRAM(s) Oral daily  multivitamin/minerals 1 Tablet(s) Oral daily  polyethylene glycol 3350 17 Gram(s) Oral daily  thiamine 100 milliGRAM(s) Oral daily    MEDICATIONS  (PRN):  acetaminophen     Tablet .. 650 milliGRAM(s) Oral every 6 hours PRN Temp greater or equal to 38C (100.4F), Mild Pain (1 - 3)  cyclobenzaprine 5 milliGRAM(s) Oral three times a day PRN Muscle Spasm  melatonin 3 milliGRAM(s) Oral at bedtime PRN Insomnia  senna 2 Tablet(s) Oral at bedtime PRN Constipation          RADIOLOGY & ADDITIONAL TESTS:    ACC: [id number] EXAM:  XR HIPS BI WITH PELV 2V   ORDERED BY: GIULIA CORDOVA     ACC: [id number] EXAM:  XR CHEST PORTABLE URGENT 1V   ORDERED BY: ORALIA MACIAS     ACC: [id number] EXAM:  XR FEMUR 2 VIEWS BI   ORDERED BY: GIULIA CORDOVA    PROCEDURE DATE:  11/22/2023          INTERPRETATION:  History:   Bilateral thigh pain after fall.    Technique: femur, 9 films. AP pelvis and bilateral hips 5 films    Comparison: None    Findings/  Impression:Mild degenerative changes are seen in the hips and knees. No   acute fracture. No dislocation. No displaced pelvic fracture. Bowel gas   overlies and obscures portions of the sacrum and iliac bone. . Vascular   calcifications are present.    ===============================  HISTORY: ; B/L thigh/quads pain, + fall this AM; r/o fx; rule out   pneumonia.  TECHNIQUE: Portable frontal view of the chest, 1 view.  COMPARISON 9/29/2019.  FINDINGS/IMPRESSION:  There is a loop recorder overlying the left chest.  HEART:difficult to access in this projection.  LUNGS: Mild interstitial edema is seen. Small left effusion. Prominent   pericardial fat pad, similar to prior. No definite evidence of pneumonia  BONES: sternotomy wires  .  < from: Xray Shoulder 2 Views, Right (11.25.23 @ 09:51) >  INTERPRETATION:  Right shoulder. 3 views. 4 images. Patient has local   pain.    There is diffuse mild degeneration around the corners of the humeral   head. There is acromioclavicular arthropathy. No fracture.    IMPRESSION: Degeneration as above.    < end of copied text >

## 2023-11-28 LAB
CULTURE RESULTS: SIGNIFICANT CHANGE UP
SPECIMEN SOURCE: SIGNIFICANT CHANGE UP

## 2023-11-28 PROCEDURE — 99233 SBSQ HOSP IP/OBS HIGH 50: CPT

## 2023-11-28 PROCEDURE — 74018 RADEX ABDOMEN 1 VIEW: CPT | Mod: 26

## 2023-11-28 RX ORDER — MINERAL OIL
133 OIL (ML) MISCELLANEOUS ONCE
Refills: 0 | Status: COMPLETED | OUTPATIENT
Start: 2023-11-28 | End: 2023-11-28

## 2023-11-28 RX ORDER — LACTULOSE 10 G/15ML
10 SOLUTION ORAL EVERY 8 HOURS
Refills: 0 | Status: COMPLETED | OUTPATIENT
Start: 2023-11-28 | End: 2023-11-29

## 2023-11-28 RX ADMIN — LOSARTAN POTASSIUM 25 MILLIGRAM(S): 100 TABLET, FILM COATED ORAL at 11:36

## 2023-11-28 RX ADMIN — POLYETHYLENE GLYCOL 3350 17 GRAM(S): 17 POWDER, FOR SOLUTION ORAL at 11:36

## 2023-11-28 RX ADMIN — GABAPENTIN 100 MILLIGRAM(S): 400 CAPSULE ORAL at 15:58

## 2023-11-28 RX ADMIN — ENOXAPARIN SODIUM 40 MILLIGRAM(S): 100 INJECTION SUBCUTANEOUS at 15:58

## 2023-11-28 RX ADMIN — Medication 133 MILLILITER(S): at 20:30

## 2023-11-28 RX ADMIN — LACTULOSE 10 GRAM(S): 10 SOLUTION ORAL at 23:02

## 2023-11-28 RX ADMIN — CARBIDOPA AND LEVODOPA 1 TABLET(S): 25; 100 TABLET ORAL at 15:59

## 2023-11-28 RX ADMIN — CARBIDOPA AND LEVODOPA 1 TABLET(S): 25; 100 TABLET ORAL at 06:38

## 2023-11-28 RX ADMIN — Medication 1 TABLET(S): at 11:36

## 2023-11-28 RX ADMIN — CARBIDOPA AND LEVODOPA 1 TABLET(S): 25; 100 TABLET ORAL at 18:20

## 2023-11-28 RX ADMIN — LEVETIRACETAM 250 MILLIGRAM(S): 250 TABLET, FILM COATED ORAL at 06:38

## 2023-11-28 RX ADMIN — GABAPENTIN 100 MILLIGRAM(S): 400 CAPSULE ORAL at 06:38

## 2023-11-28 RX ADMIN — ATORVASTATIN CALCIUM 40 MILLIGRAM(S): 80 TABLET, FILM COATED ORAL at 23:02

## 2023-11-28 RX ADMIN — CARBIDOPA AND LEVODOPA 1 TABLET(S): 25; 100 TABLET ORAL at 11:36

## 2023-11-28 RX ADMIN — Medication 100 MILLIGRAM(S): at 11:37

## 2023-11-28 RX ADMIN — LACTULOSE 10 GRAM(S): 10 SOLUTION ORAL at 15:59

## 2023-11-28 RX ADMIN — LEVETIRACETAM 250 MILLIGRAM(S): 250 TABLET, FILM COATED ORAL at 23:02

## 2023-11-28 RX ADMIN — LEVETIRACETAM 250 MILLIGRAM(S): 250 TABLET, FILM COATED ORAL at 15:59

## 2023-11-28 RX ADMIN — GABAPENTIN 200 MILLIGRAM(S): 400 CAPSULE ORAL at 23:02

## 2023-11-28 NOTE — PROGRESS NOTE ADULT - REASON FOR ADMISSION
Frequent falls, debility

## 2023-11-28 NOTE — PROGRESS NOTE ADULT - SUBJECTIVE AND OBJECTIVE BOX
CC: Difficulty in walking, not elsewhere classified    HPI: 89 yo male with a pmh/o anxiety, back pain, BPH, CAD s/p stent, RBBB, left foot drop, HLD, HTN, lumbar radiculopathy, lumbar stenosis, melanoma, s/p cholecystectomy, s/p CABG x3, s/p discectomy for herniated nucleus pulposus, syncopal episode x1 thought to be vasovagal vs autonomic dysregulation in setting of parkinson's disease, who presents from home due to inability to ambulate on own. Pt states he has a 24/7 HHA who usually helps him get to his walker however today he noticed he was more stiff than usual and that he is not strong enough to take care of himself. Pt states today he did not fall but rather "slid" himself down onto floor. HHA at bedside states was unable to lift him back up from floor so called EMS for help. Pt states he feels "fine" other than "thigh soreness". Pt states he pressed against thighs on way down. In Ed pt failed ambulation trial and family and pt feel unsafe d/c as aide not going to be there tomorrow for holiday.      INTERVAL HPI/ OVERNIGHT EVENTS:  chart reviewed, Pt was seen and examined,  reports feels better, had few BMS yesterday, abd is softer but still distended, poor appetite as per aid. + Flatus. Pt reports overall stiffness and tremors are better. R shoulder pain is better.  Plan discussed     Vital Signs Last 24 Hrs  T(C): 37.4 (28 Nov 2023 22:59), Max: 37.4 (28 Nov 2023 22:59)  T(F): 99.3 (28 Nov 2023 22:59), Max: 99.3 (28 Nov 2023 22:59)  HR: 83 (28 Nov 2023 22:59) (67 - 83)  BP: 123/69 (28 Nov 2023 22:59) (117/68 - 140/75)  BP(mean): 94 (28 Nov 2023 08:04) (94 - 94)  RR: 18 (28 Nov 2023 22:59) (18 - 19)  SpO2: 95% (28 Nov 2023 22:59) (95% - 98%)    Parameters below as of 28 Nov 2023 22:59  Patient On (Oxygen Delivery Method): room air        REVIEW OF SYSTEMS:  All other review of systems is negative unless indicated above.      PHYSICAL EXAM:  General:  Frail elderly male,  in no acute distress  Eyes:  EOMI; conjunctiva and sclera clear  Head: Normocephalic; atraumatic  ENMT: No nasal discharge; airway clear  Neck: Supple; non tender; no masses  Respiratory: Decreased BS at bases. No wheezes  Cardiovascular: Regular rate and rhythm. S1 and S2 Normal;   Gastrointestinal: Soft non-tender distended; Normal bowel sounds  Genitourinary: No  suprapubic  tenderness  Extremities: No  edema, anterior shin scabs   Neurological: Alert and oriented x 2-3, + muscle stiffness, + resting tremors   Musculoskeletal: No joint edema or erythema, no  deformities  Psychiatric: Cooperative       LABS:  all reviewed.           MEDICATIONS  (STANDING):  atorvastatin 40 milliGRAM(s) Oral at bedtime  carbidopa/levodopa  25/100 1 Tablet(s) Oral <User Schedule>  enoxaparin Injectable 40 milliGRAM(s) SubCutaneous every 24 hours  gabapentin 200 milliGRAM(s) Oral <User Schedule>  gabapentin 100 milliGRAM(s) Oral <User Schedule>  influenza  Vaccine (HIGH DOSE) 0.7 milliLiter(s) IntraMuscular once  lactulose Syrup 10 Gram(s) Oral every 8 hours  levETIRAcetam 250 milliGRAM(s) Oral <User Schedule>  losartan 25 milliGRAM(s) Oral daily  multivitamin/minerals 1 Tablet(s) Oral daily  polyethylene glycol 3350 17 Gram(s) Oral daily  thiamine 100 milliGRAM(s) Oral daily    MEDICATIONS  (PRN):  acetaminophen     Tablet .. 650 milliGRAM(s) Oral every 6 hours PRN Temp greater or equal to 38C (100.4F), Mild Pain (1 - 3)  cyclobenzaprine 5 milliGRAM(s) Oral three times a day PRN Muscle Spasm  melatonin 3 milliGRAM(s) Oral at bedtime PRN Insomnia  senna 2 Tablet(s) Oral at bedtime PRN Constipation      RADIOLOGY & ADDITIONAL TESTS:    ACC: [id number] EXAM:  XR HIPS BI WITH PELV 2V   ORDERED BY: GIULIA CORDOVA     ACC: [id number] EXAM:  XR CHEST PORTABLE URGENT 1V   ORDERED BY: ORALIA MACIAS     ACC: [id number] EXAM:  XR FEMUR 2 VIEWS BI   ORDERED BY: GIULIA CORDOVA    PROCEDURE DATE:  11/22/2023          INTERPRETATION:  History:   Bilateral thigh pain after fall.    Technique: femur, 9 films. AP pelvis and bilateral hips 5 films    Comparison: None    Findings/  Impression:Mild degenerative changes are seen in the hips and knees. No   acute fracture. No dislocation. No displaced pelvic fracture. Bowel gas   overlies and obscures portions of the sacrum and iliac bone. . Vascular   calcifications are present.    ===============================  HISTORY: ; B/L thigh/quads pain, + fall this AM; r/o fx; rule out   pneumonia.  TECHNIQUE: Portable frontal view of the chest, 1 view.  COMPARISON 9/29/2019.  FINDINGS/IMPRESSION:  There is a loop recorder overlying the left chest.  HEART:difficult to access in this projection.  LUNGS: Mild interstitial edema is seen. Small left effusion. Prominent   pericardial fat pad, similar to prior. No definite evidence of pneumonia  BONES: sternotomy wires

## 2023-11-28 NOTE — PROGRESS NOTE ADULT - NUTRITIONAL ASSESSMENT
This patient has been assessed with a concern for Malnutrition and has been determined to have a diagnosis/diagnoses of Moderate protein-calorie malnutrition.    This patient is being managed with:   Diet Regular-  Supplement Feeding Modality:  Oral  Ensure Plus High Protein Cans or Servings Per Day:  1       Frequency:  Daily  Entered: Nov 23 2023 10:46AM  
This patient has been assessed with a concern for Malnutrition and has been determined to have a diagnosis/diagnoses of Moderate protein-calorie malnutrition.    This patient is being managed with:   Diet Regular-  Supplement Feeding Modality:  Oral  Ensure Plus High Protein Cans or Servings Per Day:  1       Frequency:  Daily  Entered: Nov 23 2023 10:46AM    Diet Regular-  DASH/TLC {Sodium & Cholesterol Restricted} (DASH)  Entered: Nov 22 2023  8:07PM    The following pending diet order is being considered for treatment of Moderate protein-calorie malnutrition:null
This patient has been assessed with a concern for Malnutrition and has been determined to have a diagnosis/diagnoses of Moderate protein-calorie malnutrition.    This patient is being managed with:   Diet Regular-  Supplement Feeding Modality:  Oral  Ensure Plus High Protein Cans or Servings Per Day:  1       Frequency:  Daily  Entered: Nov 23 2023 10:46AM

## 2023-11-29 ENCOUNTER — TRANSCRIPTION ENCOUNTER (OUTPATIENT)
Age: 88
End: 2023-11-29

## 2023-11-29 VITALS
DIASTOLIC BLOOD PRESSURE: 62 MMHG | SYSTOLIC BLOOD PRESSURE: 105 MMHG | HEART RATE: 82 BPM | TEMPERATURE: 98 F | OXYGEN SATURATION: 92 %

## 2023-11-29 LAB
ANION GAP SERPL CALC-SCNC: 4 MMOL/L — LOW (ref 5–17)
ANION GAP SERPL CALC-SCNC: 4 MMOL/L — LOW (ref 5–17)
BUN SERPL-MCNC: 25 MG/DL — HIGH (ref 7–23)
BUN SERPL-MCNC: 25 MG/DL — HIGH (ref 7–23)
CALCIUM SERPL-MCNC: 8.6 MG/DL — SIGNIFICANT CHANGE UP (ref 8.5–10.1)
CALCIUM SERPL-MCNC: 8.6 MG/DL — SIGNIFICANT CHANGE UP (ref 8.5–10.1)
CHLORIDE SERPL-SCNC: 104 MMOL/L — SIGNIFICANT CHANGE UP (ref 96–108)
CHLORIDE SERPL-SCNC: 104 MMOL/L — SIGNIFICANT CHANGE UP (ref 96–108)
CO2 SERPL-SCNC: 30 MMOL/L — SIGNIFICANT CHANGE UP (ref 22–31)
CO2 SERPL-SCNC: 30 MMOL/L — SIGNIFICANT CHANGE UP (ref 22–31)
CREAT SERPL-MCNC: 0.98 MG/DL — SIGNIFICANT CHANGE UP (ref 0.5–1.3)
CREAT SERPL-MCNC: 0.98 MG/DL — SIGNIFICANT CHANGE UP (ref 0.5–1.3)
EGFR: 73 ML/MIN/1.73M2 — SIGNIFICANT CHANGE UP
EGFR: 73 ML/MIN/1.73M2 — SIGNIFICANT CHANGE UP
GLUCOSE SERPL-MCNC: 116 MG/DL — HIGH (ref 70–99)
GLUCOSE SERPL-MCNC: 116 MG/DL — HIGH (ref 70–99)
HCT VFR BLD CALC: 37.1 % — LOW (ref 39–50)
HCT VFR BLD CALC: 37.1 % — LOW (ref 39–50)
HGB BLD-MCNC: 12.4 G/DL — LOW (ref 13–17)
HGB BLD-MCNC: 12.4 G/DL — LOW (ref 13–17)
MCHC RBC-ENTMCNC: 30.7 PG — SIGNIFICANT CHANGE UP (ref 27–34)
MCHC RBC-ENTMCNC: 30.7 PG — SIGNIFICANT CHANGE UP (ref 27–34)
MCHC RBC-ENTMCNC: 33.4 GM/DL — SIGNIFICANT CHANGE UP (ref 32–36)
MCHC RBC-ENTMCNC: 33.4 GM/DL — SIGNIFICANT CHANGE UP (ref 32–36)
MCV RBC AUTO: 91.8 FL — SIGNIFICANT CHANGE UP (ref 80–100)
MCV RBC AUTO: 91.8 FL — SIGNIFICANT CHANGE UP (ref 80–100)
PLATELET # BLD AUTO: 247 K/UL — SIGNIFICANT CHANGE UP (ref 150–400)
PLATELET # BLD AUTO: 247 K/UL — SIGNIFICANT CHANGE UP (ref 150–400)
POTASSIUM SERPL-MCNC: 3.9 MMOL/L — SIGNIFICANT CHANGE UP (ref 3.5–5.3)
POTASSIUM SERPL-MCNC: 3.9 MMOL/L — SIGNIFICANT CHANGE UP (ref 3.5–5.3)
POTASSIUM SERPL-SCNC: 3.9 MMOL/L — SIGNIFICANT CHANGE UP (ref 3.5–5.3)
POTASSIUM SERPL-SCNC: 3.9 MMOL/L — SIGNIFICANT CHANGE UP (ref 3.5–5.3)
RBC # BLD: 4.04 M/UL — LOW (ref 4.2–5.8)
RBC # BLD: 4.04 M/UL — LOW (ref 4.2–5.8)
RBC # FLD: 12.2 % — SIGNIFICANT CHANGE UP (ref 10.3–14.5)
RBC # FLD: 12.2 % — SIGNIFICANT CHANGE UP (ref 10.3–14.5)
SODIUM SERPL-SCNC: 138 MMOL/L — SIGNIFICANT CHANGE UP (ref 135–145)
SODIUM SERPL-SCNC: 138 MMOL/L — SIGNIFICANT CHANGE UP (ref 135–145)
WBC # BLD: 9.96 K/UL — SIGNIFICANT CHANGE UP (ref 3.8–10.5)
WBC # BLD: 9.96 K/UL — SIGNIFICANT CHANGE UP (ref 3.8–10.5)
WBC # FLD AUTO: 9.96 K/UL — SIGNIFICANT CHANGE UP (ref 3.8–10.5)
WBC # FLD AUTO: 9.96 K/UL — SIGNIFICANT CHANGE UP (ref 3.8–10.5)

## 2023-11-29 PROCEDURE — 99239 HOSP IP/OBS DSCHRG MGMT >30: CPT

## 2023-11-29 RX ORDER — GABAPENTIN 400 MG/1
1 CAPSULE ORAL
Qty: 0 | Refills: 0 | DISCHARGE
Start: 2023-11-29

## 2023-11-29 RX ORDER — FOLIC ACID 0.8 MG
1 TABLET ORAL
Qty: 0 | Refills: 0 | DISCHARGE

## 2023-11-29 RX ORDER — HYDROCHLOROTHIAZIDE 25 MG
1 TABLET ORAL
Refills: 0 | DISCHARGE

## 2023-11-29 RX ORDER — GABAPENTIN 400 MG/1
1 CAPSULE ORAL
Refills: 0 | DISCHARGE

## 2023-11-29 RX ORDER — LANOLIN ALCOHOL/MO/W.PET/CERES
1 CREAM (GRAM) TOPICAL
Qty: 0 | Refills: 0 | DISCHARGE
Start: 2023-11-29

## 2023-11-29 RX ORDER — CYCLOBENZAPRINE HYDROCHLORIDE 10 MG/1
1 TABLET, FILM COATED ORAL
Qty: 0 | Refills: 0 | DISCHARGE
Start: 2023-11-29

## 2023-11-29 RX ORDER — CARBIDOPA AND LEVODOPA 25; 100 MG/1; MG/1
1 TABLET ORAL
Qty: 0 | Refills: 0 | DISCHARGE

## 2023-11-29 RX ORDER — GABAPENTIN 400 MG/1
2 CAPSULE ORAL
Qty: 0 | Refills: 0 | DISCHARGE
Start: 2023-11-29

## 2023-11-29 RX ORDER — THIAMINE MONONITRATE (VIT B1) 100 MG
1 TABLET ORAL
Qty: 0 | Refills: 0 | DISCHARGE
Start: 2023-11-29

## 2023-11-29 RX ORDER — POLYETHYLENE GLYCOL 3350 17 G/17G
17 POWDER, FOR SOLUTION ORAL
Qty: 0 | Refills: 0 | DISCHARGE
Start: 2023-11-29

## 2023-11-29 RX ORDER — CHOLECALCIFEROL (VITAMIN D3) 125 MCG
1 CAPSULE ORAL
Qty: 0 | Refills: 0 | DISCHARGE

## 2023-11-29 RX ORDER — SENNA PLUS 8.6 MG/1
2 TABLET ORAL
Qty: 0 | Refills: 0 | DISCHARGE
Start: 2023-11-29

## 2023-11-29 RX ORDER — MULTIVIT-MIN/FERROUS GLUCONATE 9 MG/15 ML
1 LIQUID (ML) ORAL
Qty: 0 | Refills: 0 | DISCHARGE
Start: 2023-11-29

## 2023-11-29 RX ORDER — PREGABALIN 225 MG/1
1 CAPSULE ORAL
Qty: 0 | Refills: 0 | DISCHARGE

## 2023-11-29 RX ADMIN — Medication 1 TABLET(S): at 11:27

## 2023-11-29 RX ADMIN — LEVETIRACETAM 250 MILLIGRAM(S): 250 TABLET, FILM COATED ORAL at 14:23

## 2023-11-29 RX ADMIN — GABAPENTIN 100 MILLIGRAM(S): 400 CAPSULE ORAL at 06:18

## 2023-11-29 RX ADMIN — LACTULOSE 10 GRAM(S): 10 SOLUTION ORAL at 06:18

## 2023-11-29 RX ADMIN — POLYETHYLENE GLYCOL 3350 17 GRAM(S): 17 POWDER, FOR SOLUTION ORAL at 11:26

## 2023-11-29 RX ADMIN — Medication 100 MILLIGRAM(S): at 11:27

## 2023-11-29 RX ADMIN — CARBIDOPA AND LEVODOPA 1 TABLET(S): 25; 100 TABLET ORAL at 14:23

## 2023-11-29 RX ADMIN — CARBIDOPA AND LEVODOPA 1 TABLET(S): 25; 100 TABLET ORAL at 06:17

## 2023-11-29 RX ADMIN — LEVETIRACETAM 250 MILLIGRAM(S): 250 TABLET, FILM COATED ORAL at 06:17

## 2023-11-29 RX ADMIN — LOSARTAN POTASSIUM 25 MILLIGRAM(S): 100 TABLET, FILM COATED ORAL at 11:27

## 2023-11-29 RX ADMIN — CARBIDOPA AND LEVODOPA 1 TABLET(S): 25; 100 TABLET ORAL at 11:26

## 2023-11-29 RX ADMIN — GABAPENTIN 100 MILLIGRAM(S): 400 CAPSULE ORAL at 14:22

## 2023-11-29 RX ADMIN — ENOXAPARIN SODIUM 40 MILLIGRAM(S): 100 INJECTION SUBCUTANEOUS at 14:23

## 2023-11-29 NOTE — DISCHARGE NOTE PROVIDER - NSDCCPCAREPLAN_GEN_ALL_CORE_FT
PRINCIPAL DISCHARGE DIAGNOSIS  Diagnosis: Unable to ambulate  Assessment and Plan of Treatment: likely due to worse ing parkinsons an dneuropathy with dehyudration   Oral hydration  Hold HCTZ  C/w new dose of Sinemet and gabapentin   F/u with neuro OutPt      SECONDARY DISCHARGE DIAGNOSES  Diagnosis: Parkinson's disease  Assessment and Plan of Treatment:

## 2023-11-29 NOTE — DISCHARGE NOTE PROVIDER - HOSPITAL COURSE
89 yo male with a pmh/o anxiety, back pain, BPH, CAD s/p stent, RBBB, left foot drop, HLD, HTN, lumbar radiculopathy, lumbar stenosis, melanoma, s/p cholecystectomy, s/p CABG x3, s/p discectomy for herniated nucleus pulposus, syncopal episode x1 thought to be vasovagal vs autonomic dysregulation in setting of parkinson's disease, who presented  from home due to inability to ambulate on own. Pt reported  he has a 24/7 HHA who usually helps him get to his walker however  he noticed he was more stiff than usual and that he is not strong enough to take care of himself. Ptreported that on day of admission  he did not fall but rather "slid" himself down onto floor. HHA  was unable to lift him back up from floor so called EMS for help. Pt states he feels "fine" other than "thigh soreness".    In Ed pt failed ambulation trial pt was admitted for further evaluation. VS stable. Labs with elevated WBCs, mildly elevated BUN/CR. Hip/pelvic XR neg for Fx. R shoulder XR:  neg for acute FX.  BCX and UA/ UCX neg.  Pt was given IVF with resolution of leukocytosis and  Renal Fx improved. pt was evaluated by neuro, Sinemet dose  and gabapentin dose increased with improvement of stiffness/tremors and numbness. Pt was able to ambulate with PT, [facility] was recommended. Hospital course significant for abd distention and constipation. S/p enemas and Bowel regimen. Had multiple BMs. Abd discomfort improved  Today Pt reports feels well. Had few BMs overnight.  Reports muscle stiffness and tremors overall improved Dc plan to [facility] discussed.   Vital Signs Last 24 Hrs  T(C): 36.4 (29 Nov 2023 07:44), Max: 37.4 (28 Nov 2023 22:59)  T(F): 97.5 (29 Nov 2023 07:44), Max: 99.3 (28 Nov 2023 22:59)  HR: 78 (29 Nov 2023 07:44) (67 - 83)  BP: 137/77 (29 Nov 2023 07:44) (117/68 - 137/77)  RR: 18 (29 Nov 2023 07:44) (18 - 18)  SpO2: 97% (29 Nov 2023 07:44) (95% - 98%)    Parameters below as of 29 Nov 2023 07:44  Patient On (Oxygen Delivery Method): room air      PHYSICAL EXAM:  General:  Frail elderly male,  in no acute distress  Eyes:  EOMI; conjunctiva and sclera clear  Head: Normocephalic; atraumatic  ENMT: No nasal discharge; airway clear  Neck: Supple; non tender; no masses  Respiratory: Decreased BS at bases. No wheezes  Cardiovascular: Regular rate and rhythm. S1 and S2 Normal;   Gastrointestinal: Soft non-tender distended; Normal bowel sounds  Genitourinary: No  suprapubic  tenderness  Extremities: No  edema, anterior shin scabs   Neurological: Alert and oriented x 2-3, + muscle stiffness, + resting tremors   Musculoskeletal: No joint edema or erythema, no  deformities  Psychiatric: Cooperative       A/P:  89 yo male with a pmh/o syncope, CABG, GERD, Parkinson's disease,  LE neuropathy  admitted for:       #Frequent falls and difficulty ambulating due to Debility and deconditioning  in settings of Parkinson Dz and neuropathy, OA,  advanced age.   No trauma . Hip/pelvis/femur:  XR neg for acute Fx   US: neg for thigh hematoma   CPK mildly elevated likely 2/2 immobility  Fall precautions  Supportive care   PT       #Leukocytosis, resolved   No signs of infection, possible dehydration?   CXR- no consolidation /PNA   UA not suggestive of infection   LE doppler neg for  DVT    WBCs better after IVF   Hold HCTZ for now , oral hydration   monitor off abx    Elevated BUN/CR due to mild NAVYA 2/2  dehydration   resolved with IVF  Hold HCTZ  oral hydration     Parkinson's disease. LE neuropathy   C/w Sinemet, increased to QID this adm   C/w Gabapentin 200 TID   neuro recs appreciated        # h/o  Seizure Disorder  C/w Keppra home med     #HTN/HLD/CAD  off   HCTZ due to dehydration   c/w ARB interchanged to losartan   lipitor     # Severe Pt calorie malnutrition   Liberalize diet to regular   B12/folate low Normal supplement   Vit D low  supplement   Supplement MVI   Protein  supplements     # Abd distention,  Constipation  Abd XR personally reviewed,  no obstruction, ileus, + stool in rectum  S/p enemas and lactulose with multiple stools   C/w bowel  regimen     # DVT PPX: [facility] SQ       Dispo; stable for dc to [facility]   Fax dc summary to PCP  Total time 42 min

## 2023-11-29 NOTE — DISCHARGE NOTE PROVIDER - NSDCMRMEDTOKEN_GEN_ALL_CORE_FT
carbidopa-levodopa 25 mg-100 mg oral tablet: 1 tab(s) orally 4 times a day at 7am, 11am, 3pm, 7pm  cyanocobalamin 1000 mcg oral tablet: 1 tab(s) orally once a day  cyclobenzaprine 5 mg oral tablet: 1 tab(s) orally 3 times a day As needed Muscle Spasm   50 mcg (2000 intl units) oral capsule: 1 cap(s) orally once a day (at bedtime)  folic acid 1 mg oral tablet: 1 tab(s) orally once a day  gabapentin 100 mg oral capsule: 2 cap(s) orally once a day (at bedtime)  gabapentin 100 mg oral capsule: 1 cap(s) orally 2 times a day 7 am and 3pm  irbesartan 75 mg oral tablet: 1 tab(s) orally once a day  levETIRAcetam 750 mg oral tablet, extended release: 1 tab(s) orally once a day  melatonin 3 mg oral tablet: 1 tab(s) orally once a day (at bedtime) As needed Insomnia  Multiple Vitamins with Minerals oral tablet: 1 tab(s) orally once a day  polyethylene glycol 3350 oral powder for reconstitution: 17 gram(s) orally once a day  rosuvastatin 10 mg oral tablet: 1 tab(s) orally once a day  senna leaf extract oral tablet: 2 tab(s) orally once a day (at bedtime) As needed Constipation  thiamine 100 mg oral tablet: 1 tab(s) orally once a day

## 2023-11-29 NOTE — DISCHARGE NOTE PROVIDER - CARE PROVIDER_API CALL
Royal Noble  Cardiovascular Disease  [address] [place] [address]  Phone: [phone]  Fax: [phone]  Follow Up Time:

## 2023-11-29 NOTE — DISCHARGE NOTE NURSING/CASE MANAGEMENT/SOCIAL WORK - NSDCPEFALRISK_GEN_ALL_CORE
For information on Fall & Injury Prevention, visit: [email or web address] OR  [email or web address] OR  https://www.cdc.gov/steadi/patient.html

## 2023-11-29 NOTE — DISCHARGE NOTE PROVIDER - DETAILS OF MALNUTRITION DIAGNOSIS/DIAGNOSES
This patient has been assessed with a concern for Malnutrition and was treated during this hospitalization for the following Nutrition diagnosis/diagnoses:     -  11/23/2023: Moderate protein-calorie malnutrition

## 2023-11-29 NOTE — DISCHARGE NOTE NURSING/CASE MANAGEMENT/SOCIAL WORK - NSDCVIVACCINE_GEN_ALL_CORE_FT
Tdap; 01-Oct-2018 14:34; Erica Nair); [employer]; [id number] (Exp. Date: 22-Aug-2020); IntraMuscular; Deltoid Left.; 0.5 milliLiter(s); VIS (VIS Published: 09-May-2013, VIS Presented: 01-Oct-2018);

## 2023-11-29 NOTE — DISCHARGE NOTE NURSING/CASE MANAGEMENT/SOCIAL WORK - PATIENT PORTAL LINK FT
You can access the FollowMyHealth Patient Portal offered by [facility] by registering at the following website: [email or web address]. By joining [email or web address] FollowMyHealth portal, you will also be able to view your health information using other applications (apps) compatible with our system.

## 2023-12-03 DIAGNOSIS — G62.9 POLYNEUROPATHY, UNSPECIFIED: ICD-10-CM

## 2023-12-03 DIAGNOSIS — Z95.1 PRESENCE OF AORTOCORONARY BYPASS GRAFT: ICD-10-CM

## 2023-12-03 DIAGNOSIS — E78.5 HYPERLIPIDEMIA, UNSPECIFIED: ICD-10-CM

## 2023-12-03 DIAGNOSIS — F41.9 ANXIETY DISORDER, UNSPECIFIED: ICD-10-CM

## 2023-12-03 DIAGNOSIS — Z95.5 PRESENCE OF CORONARY ANGIOPLASTY IMPLANT AND GRAFT: ICD-10-CM

## 2023-12-03 DIAGNOSIS — I25.10 ATHEROSCLEROTIC HEART DISEASE OF NATIVE CORONARY ARTERY WITHOUT ANGINA PECTORIS: ICD-10-CM

## 2023-12-03 DIAGNOSIS — E86.0 DEHYDRATION: ICD-10-CM

## 2023-12-03 DIAGNOSIS — K21.9 GASTRO-ESOPHAGEAL REFLUX DISEASE WITHOUT ESOPHAGITIS: ICD-10-CM

## 2023-12-03 DIAGNOSIS — G20.A1 PARKINSON'S DISEASE WITHOUT DYSKINESIA, WITHOUT MENTION OF FLUCTUATIONS: ICD-10-CM

## 2023-12-03 DIAGNOSIS — G40.909 EPILEPSY, UNSPECIFIED, NOT INTRACTABLE, WITHOUT STATUS EPILEPTICUS: ICD-10-CM

## 2023-12-03 DIAGNOSIS — I10 ESSENTIAL (PRIMARY) HYPERTENSION: ICD-10-CM

## 2023-12-03 DIAGNOSIS — K59.00 CONSTIPATION, UNSPECIFIED: ICD-10-CM

## 2023-12-03 DIAGNOSIS — R53.81 OTHER MALAISE: ICD-10-CM

## 2023-12-03 DIAGNOSIS — E44.0 MODERATE PROTEIN-CALORIE MALNUTRITION: ICD-10-CM

## 2023-12-03 DIAGNOSIS — M19.90 UNSPECIFIED OSTEOARTHRITIS, UNSPECIFIED SITE: ICD-10-CM

## 2024-01-21 NOTE — CDI QUERY NOTE - NSCDIOTHERTXTBX_GEN_ALL_CORE_HH
There is conflict with the diagnosis of moderate and severe malnutrition which requires further clarification.  Malnutrition was assessed by the Dietitian on  1/23/2023 and the Hospitalist notes on 10/24 & 10/25 stated “no evidence of malnutrition”.   Subsequent Hospitalist documentation on 10/26 through discharge, including the D/C note, stated that the patient did have malnutrition.      Can the diagnosis of malnutrition be clarified, after study?    -Severe malnutrition was ruled in as evidenced by ___________(please also see the dietitian evaluation for further details)  - Moderate malnutrition was ruled in as evidenced by ___________(please also see the dietitian evaluation for further details)  -Other       Clinical indicators:    RD nutrition risk notification on 11/23/2023:  Moderate protein-calorie malnutrition      Discharge summary on 11/29/2023:  # Severe Pt calorie malnutrition   Liberalize diet to regular   B12/folate low Normal supplement   Vit D low  supplement   Supplement MVI   Protein  supplements There is conflict with the diagnosis of moderate and severe malnutrition which requires further clarification.   Moderate malnutrition was assessed by the Dietitian on  11/23/2023 and the Hospitalist progress notes from 11/23/2023 forward state severe protein calorie malnutrition.     Can the diagnosis of malnutrition be clarified, after study?    -Severe malnutrition was ruled in as evidenced by ___________(please also see the dietitian evaluation for further details)  - Moderate malnutrition was ruled in as evidenced by ___________(please also see the dietitian evaluation for further details)  -Other       Clinical indicators:    RD nutrition risk notification on 11/23/2023:  Moderate protein-calorie malnutrition      Discharge summary on 11/29/2023:  # Severe Pt calorie malnutrition   Liberalize diet to regular   B12/folate low Normal supplement   Vit D low  supplement   Supplement MVI   Protein  supplements

## 2024-05-20 NOTE — ED PROVIDER NOTE - NS ED ATTENDING STATEMENT MOD
Spoke with patient and scheduled mcl labs for 05.22.24 . Places pre op copy in patient  mail bin and left another with surgical coordinators. Patient was also scheduled for pre op/ mcl visit.   Attending Only
